# Patient Record
Sex: FEMALE | Race: WHITE | NOT HISPANIC OR LATINO | URBAN - METROPOLITAN AREA
[De-identification: names, ages, dates, MRNs, and addresses within clinical notes are randomized per-mention and may not be internally consistent; named-entity substitution may affect disease eponyms.]

---

## 2017-01-10 ENCOUNTER — INPATIENT (INPATIENT)
Facility: HOSPITAL | Age: 77
LOS: 7 days | Discharge: HOME HEALTH | End: 2017-01-18
Attending: SURGERY | Admitting: SURGERY
Payer: MEDICARE

## 2017-01-10 PROCEDURE — 93010 ELECTROCARDIOGRAM REPORT: CPT

## 2017-01-10 PROCEDURE — 71010: CPT | Mod: 26

## 2017-01-10 PROCEDURE — 99291 CRITICAL CARE FIRST HOUR: CPT

## 2017-01-12 PROCEDURE — 74177 CT ABD & PELVIS W/CONTRAST: CPT | Mod: 26

## 2017-01-17 PROCEDURE — 74177 CT ABD & PELVIS W/CONTRAST: CPT | Mod: 26

## 2017-01-23 DIAGNOSIS — I10 ESSENTIAL (PRIMARY) HYPERTENSION: ICD-10-CM

## 2017-01-23 DIAGNOSIS — E83.42 HYPOMAGNESEMIA: ICD-10-CM

## 2017-01-23 DIAGNOSIS — E78.5 HYPERLIPIDEMIA, UNSPECIFIED: ICD-10-CM

## 2017-01-23 DIAGNOSIS — R10.9 UNSPECIFIED ABDOMINAL PAIN: ICD-10-CM

## 2017-01-23 DIAGNOSIS — Z87.891 PERSONAL HISTORY OF NICOTINE DEPENDENCE: ICD-10-CM

## 2017-01-23 DIAGNOSIS — K51.90 ULCERATIVE COLITIS, UNSPECIFIED, WITHOUT COMPLICATIONS: ICD-10-CM

## 2017-01-23 DIAGNOSIS — K57.00 DIVERTICULITIS OF SMALL INTESTINE WITH PERFORATION AND ABSCESS WITHOUT BLEEDING: ICD-10-CM

## 2017-01-23 DIAGNOSIS — E11.9 TYPE 2 DIABETES MELLITUS WITHOUT COMPLICATIONS: ICD-10-CM

## 2017-01-23 DIAGNOSIS — I48.91 UNSPECIFIED ATRIAL FIBRILLATION: ICD-10-CM

## 2017-01-23 DIAGNOSIS — E87.1 HYPO-OSMOLALITY AND HYPONATREMIA: ICD-10-CM

## 2017-01-23 DIAGNOSIS — E87.6 HYPOKALEMIA: ICD-10-CM

## 2017-01-23 DIAGNOSIS — E66.9 OBESITY, UNSPECIFIED: ICD-10-CM

## 2017-01-30 DIAGNOSIS — K26.5 CHRONIC OR UNSPECIFIED DUODENAL ULCER WITH PERFORATION: ICD-10-CM

## 2017-01-30 DIAGNOSIS — L02.91 CUTANEOUS ABSCESS, UNSPECIFIED: ICD-10-CM

## 2020-08-16 ENCOUNTER — APPOINTMENT (OUTPATIENT)
Dept: DISASTER EMERGENCY | Facility: CLINIC | Age: 80
End: 2020-08-16

## 2020-08-16 DIAGNOSIS — Z01.818 ENCOUNTER FOR OTHER PREPROCEDURAL EXAMINATION: ICD-10-CM

## 2020-08-17 LAB — SARS-COV-2 N GENE NPH QL NAA+PROBE: NOT DETECTED

## 2020-08-18 NOTE — H&P ADULT - NSHPREVIEWOFSYSTEMS_GEN_ALL_CORE
General: Pt denies recent weight loss/fever/chills    Neurological: denies numbness or  sensation loss    Cardiovascular: denies chest pain/palpitations/leg edema    Respiratory and Thorax: + DAMON, denies cough/wheezing    Gastrointestinal: denies abdominal pain/diarrhea/ nausea/ vomiting/ constipation/bloody stool    Genitourinary: denies urinary frequency/urgency/ dysuria/ hematuria     Musculoskeletal: + LE swelling especially in B/L ankle, denies joint pain or restricted range of motion    Hematologic: denies abnormal bleeding

## 2020-08-18 NOTE — ASU PATIENT PROFILE, ADULT - MEDICATIONS TO HOLD
Patient given instructions from MD office to hold metformin and hctz 1 day prior to procedure and to hold eliquis prior to procedure (last eliquis dose taken in a.m. on 8/18, MD made aware).

## 2020-08-18 NOTE — H&P ADULT - PROBLEM SELECTOR PLAN 1
Pt is referred for Lt heart cath/possible PCI. Labs & medications are reviewed. Informed consent obtained after discussion of [facility] risks, benefits and alternatives  with patient. Risk discussed included, but not limited to MI, stroke, mortality, major bleeding, arrhythmia, or infection.  An educational material provided. Pt. verbalizes understandings of pre-procedural instructions.

## 2020-08-18 NOTE — H&P ADULT - ASSESSMENT
80 yo F with PMHx of HTN,HLD, DM, sleep apnea on CPAP, PAFib on eliquis, developed intermittent chest discomfort especially with movement. Pt underwent stress test which showed (+) ischemia in LAD territory.  Pt referred for LHC with possible intervention.  Covid-19 (8/16) non detected.  ASA:  Creatinine:  GFR:  Calculated bleeding risk score: 78 yo F with PMHx of HTN, HLD, T2DM,  sleep apnea on CPAP, P. AFib on eliquis with c/o worsening of SOB with exertion last 2-3 months. Pt underwent stress test which showed (+) ischemia in LAD territory.  Pt referred for LHC with possible intervention.    ASA: II  Creatinine: 1.1  GFR: 48  Calculated bleeding risk score: 2.6%

## 2020-08-18 NOTE — H&P ADULT - NSICDXPASTMEDICALHX_GEN_ALL_CORE_FT
PAST MEDICAL HISTORY:  DM (diabetes mellitus)     H/O gastroesophageal reflux (GERD)     History of ulcerative colitis     HLD (hyperlipidemia)     HTN (hypertension)     ANU (obstructive sleep apnea) on CPAP    Paroxysmal A-fib on Eliquis

## 2020-08-18 NOTE — H&P ADULT - NSHPPHYSICALEXAM_GEN_ALL_CORE
Vital Signs :   BP: 143/51       HR:72      RR: 17       O2 sat;     88% with RA, 94% with 2L NC     Constitutional: well developed, well nourished, no deformities and no acute distress    Neurological: Alert & Oriented x 3, DURANT, no focal deficits    HEENT: NC/AT, PERRLA, EOMI,  Neck supple.    Respiratory: CTA B/L, + fine crackles B/L, no wheezing     Cardiovascular: (+) S1 & S2, RRR, No murmur/ rub/ gallop     Gastrointestinal: soft, Nontender, nondistended, (+) BS    Genitourinary: non distended bladder, voiding freely    Extremities: + 2 pedal edema B/L, No clubbing, No cyanosis, 2+  PT/ DP pulses     Skin:  normal skin color and pigmentation, no skin lesions

## 2020-08-18 NOTE — H&P ADULT - NSICDXFAMILYHX_GEN_ALL_CORE_FT
FAMILY HISTORY:  FH: cancer, brother  FH: CHF (congestive heart failure), father  FH: HTN (hypertension), mother, father  FH: stroke, mother

## 2020-08-18 NOTE — H&P ADULT - HISTORY OF PRESENT ILLNESS
78 yo F with PMHx of HTN,HLD, DM, sleep apnea on CPAP, PAFib on eliquis, developed intermittent chest discomfort especially with movement. Pt underwent stress test which showed (+) ischemia in LAD territory.  Pt referred for LHC with possible intervention.  Covid-19 (8/16) non detected. 80 yo F with PMHx of HTN, HLD, T2DM,  sleep apnea on CPAP, P. AFib on eliquis with c/o worsening of SOB with exertion last 2-3 months. Pt underwent stress test which showed (+) ischemia in LAD territory.  Pt referred for LHC with possible intervention.  Covid-19 (8/16) non detected.

## 2020-08-18 NOTE — H&P ADULT - NSHPSOCIALHISTORY_GEN_ALL_CORE
Alcohol: 1-2 glasses of wine/ day   Smoking: quit 12 years ago, 1 pack/ day since age 12  Illicit drug: denies

## 2020-08-19 ENCOUNTER — OUTPATIENT (OUTPATIENT)
Dept: OUTPATIENT SERVICES | Facility: HOSPITAL | Age: 80
LOS: 1 days | Discharge: HOME | End: 2020-08-19
Payer: MEDICARE

## 2020-08-19 VITALS
RESPIRATION RATE: 16 BRPM | SYSTOLIC BLOOD PRESSURE: 133 MMHG | DIASTOLIC BLOOD PRESSURE: 50 MMHG | HEART RATE: 72 BPM | OXYGEN SATURATION: 94 %

## 2020-08-19 VITALS
OXYGEN SATURATION: 88 % | WEIGHT: 220.02 LBS | DIASTOLIC BLOOD PRESSURE: 51 MMHG | SYSTOLIC BLOOD PRESSURE: 143 MMHG | RESPIRATION RATE: 16 BRPM | HEART RATE: 71 BPM | TEMPERATURE: 98 F

## 2020-08-19 DIAGNOSIS — R94.39 ABNORMAL RESULT OF OTHER CARDIOVASCULAR FUNCTION STUDY: ICD-10-CM

## 2020-08-19 DIAGNOSIS — Z90.49 ACQUIRED ABSENCE OF OTHER SPECIFIED PARTS OF DIGESTIVE TRACT: Chronic | ICD-10-CM

## 2020-08-19 PROCEDURE — 93005 ELECTROCARDIOGRAM TRACING: CPT | Mod: XU

## 2020-08-19 PROCEDURE — C1894: CPT

## 2020-08-19 PROCEDURE — 93010 ELECTROCARDIOGRAM REPORT: CPT

## 2020-08-19 PROCEDURE — 93458 L HRT ARTERY/VENTRICLE ANGIO: CPT

## 2020-08-19 PROCEDURE — C1769: CPT

## 2020-08-19 PROCEDURE — C1887: CPT

## 2020-08-19 RX ORDER — APIXABAN 2.5 MG/1
1 TABLET, FILM COATED ORAL
Qty: 0 | Refills: 0 | DISCHARGE

## 2020-08-19 RX ORDER — FUROSEMIDE 40 MG
1 TABLET ORAL
Qty: 30 | Refills: 1
Start: 2020-08-19 | End: 2020-10-17

## 2020-08-19 RX ORDER — METFORMIN HYDROCHLORIDE 850 MG/1
1 TABLET ORAL
Qty: 0 | Refills: 0 | DISCHARGE

## 2020-08-19 NOTE — PACU DISCHARGE NOTE - COMMENTS
patient discharged to home. right wrist pressure drsg clean/dry/intact. IVL D/C'd site benign. Patient without any complaints. Vital signs stable. Discharge instructions given and understood by patient and medications reviewed. Will continue to monitor patient status.

## 2020-08-20 DIAGNOSIS — R94.39 ABNORMAL RESULT OF OTHER CARDIOVASCULAR FUNCTION STUDY: ICD-10-CM

## 2020-08-20 DIAGNOSIS — I20.0 UNSTABLE ANGINA: ICD-10-CM

## 2020-08-20 DIAGNOSIS — I10 ESSENTIAL (PRIMARY) HYPERTENSION: ICD-10-CM

## 2020-08-20 DIAGNOSIS — E11.9 TYPE 2 DIABETES MELLITUS WITHOUT COMPLICATIONS: ICD-10-CM

## 2020-08-20 DIAGNOSIS — I25.110 ATHEROSCLEROTIC HEART DISEASE OF NATIVE CORONARY ARTERY WITH UNSTABLE ANGINA PECTORIS: ICD-10-CM

## 2020-08-20 DIAGNOSIS — E78.5 HYPERLIPIDEMIA, UNSPECIFIED: ICD-10-CM

## 2020-08-20 DIAGNOSIS — Z88.6 ALLERGY STATUS TO ANALGESIC AGENT: ICD-10-CM

## 2020-08-20 DIAGNOSIS — G47.33 OBSTRUCTIVE SLEEP APNEA (ADULT) (PEDIATRIC): ICD-10-CM

## 2020-08-20 DIAGNOSIS — K21.9 GASTRO-ESOPHAGEAL REFLUX DISEASE WITHOUT ESOPHAGITIS: ICD-10-CM

## 2020-08-20 DIAGNOSIS — I48.91 UNSPECIFIED ATRIAL FIBRILLATION: ICD-10-CM

## 2020-08-22 RX ORDER — METFORMIN HYDROCHLORIDE 850 MG/1
1 TABLET ORAL
Qty: 0 | Refills: 0 | DISCHARGE
Start: 2020-08-22

## 2020-08-22 RX ORDER — METFORMIN HYDROCHLORIDE 850 MG/1
2 TABLET ORAL
Qty: 0 | Refills: 0 | DISCHARGE
Start: 2020-08-22

## 2020-08-28 NOTE — ASU PREOP CHECKLIST - TEMPERATURE IN FAHRENHEIT (DEGREES F)
98.1 p/w abd pain, lipase 101k, kojo 1.4  US abd showed distended gallbladder and cholelithiasis.  - possibly gallstone passed as abd pain subsided and kojo went down, liver enzymes trending down  - NPO, IVF, pain control  - f/u MRCP

## 2020-12-24 PROBLEM — E11.9 TYPE 2 DIABETES MELLITUS WITHOUT COMPLICATIONS: Chronic | Status: ACTIVE | Noted: 2020-08-19

## 2020-12-24 PROBLEM — I10 ESSENTIAL (PRIMARY) HYPERTENSION: Chronic | Status: ACTIVE | Noted: 2020-08-19

## 2020-12-24 PROBLEM — Z87.19 PERSONAL HISTORY OF OTHER DISEASES OF THE DIGESTIVE SYSTEM: Chronic | Status: ACTIVE | Noted: 2020-08-19

## 2020-12-24 PROBLEM — E78.5 HYPERLIPIDEMIA, UNSPECIFIED: Chronic | Status: ACTIVE | Noted: 2020-08-19

## 2020-12-24 PROBLEM — I48.0 PAROXYSMAL ATRIAL FIBRILLATION: Chronic | Status: ACTIVE | Noted: 2020-08-19

## 2020-12-24 PROBLEM — G47.33 OBSTRUCTIVE SLEEP APNEA (ADULT) (PEDIATRIC): Chronic | Status: ACTIVE | Noted: 2020-08-19

## 2021-02-17 ENCOUNTER — APPOINTMENT (OUTPATIENT)
Dept: DERMATOLOGY | Facility: CLINIC | Age: 81
End: 2021-02-17

## 2022-05-31 ENCOUNTER — APPOINTMENT (OUTPATIENT)
Dept: GASTROENTEROLOGY | Facility: CLINIC | Age: 82
End: 2022-05-31
Payer: MEDICARE

## 2022-05-31 VITALS
DIASTOLIC BLOOD PRESSURE: 53 MMHG | SYSTOLIC BLOOD PRESSURE: 125 MMHG | HEIGHT: 64 IN | WEIGHT: 205 LBS | BODY MASS INDEX: 35 KG/M2 | HEART RATE: 72 BPM

## 2022-05-31 DIAGNOSIS — R53.1 WEAKNESS: ICD-10-CM

## 2022-05-31 PROCEDURE — 99204 OFFICE O/P NEW MOD 45 MIN: CPT

## 2022-05-31 NOTE — ASSESSMENT
[FreeTextEntry1] : 81 F with hx of UC on mesalamine presenting for non bloody diarrhea x 1 month. She has + recent travel abroad, no recent abx use. Will check stool studies to r/o infectious cause vs ?UC flare. Has not had colonoscopy in many years, however she reports negative cologuard last year by her PCP. Will obtain labs to r/o electrolyte imbalance. Advised to maintain hydration. Will consider adding budesonide if stool studies negative for infectious source.  Will consider colonoscopy if symptoms continue or do not improve. Understanding verbalized. All questions answered. Discussed with Dr. Gomez. \par

## 2022-05-31 NOTE — PHYSICAL EXAM
[General Appearance - Alert] : alert [General Appearance - In No Acute Distress] : in no acute distress [Auscultation Breath Sounds / Voice Sounds] : lungs were clear to auscultation bilaterally [FreeTextEntry1] : on chronic home O2 via nasal cannula [Heart Rate And Rhythm] : heart rate was normal and rhythm regular [Heart Sounds] : normal S1 and S2 [Heart Sounds Gallop] : no gallops [Murmurs] : no murmurs [Heart Sounds Pericardial Friction Rub] : no pericardial rub [Bowel Sounds] : normal bowel sounds [Abdomen Soft] : soft [Abdomen Tenderness] : non-tender [] : no hepato-splenomegaly [Abdomen Mass (___ Cm)] : no abdominal mass palpated [Abnormal Walk] : normal gait [Nail Clubbing] : no clubbing  or cyanosis of the fingernails [Musculoskeletal - Swelling] : no joint swelling seen [Motor Tone] : muscle strength and tone were normal [Oriented To Time, Place, And Person] : oriented to person, place, and time [Impaired Insight] : insight and judgment were intact [Affect] : the affect was normal

## 2022-05-31 NOTE — HISTORY OF PRESENT ILLNESS
[FreeTextEntry1] : 81 F with hx of UC on mesalamine presenting for diarrhea x 1 month. Reports in early may she began to have frequent bouts of "loose stool" for about 1.5 weeks. Diarrhea resolved on its own, and she went on a trip to the [place]. Reports During her trip diarrhea resumed, and continued after she arrived home. Diarrhea stopped suddenly this past Thursday, and she had not had a BM until this AM. Describes BM this Am as not quite formed, "very soft", but not diarrhea. She feels more weak and tired than usual. Denies chest pain, shortness of breath, nausea, vomiting, abdominal pain, constipation, melena, hematochezia, unintentional weight changes, fevers, chills. Of note she has not had a colonoscopy in many years. Per patient her PCP sent cologuard last year which was negative. PCP has been managing UC medications.

## 2022-06-01 LAB
ALBUMIN SERPL ELPH-MCNC: 4.2 G/DL
ALP BLD-CCNC: 76 U/L
ALT SERPL-CCNC: 13 U/L
ANION GAP SERPL CALC-SCNC: 16 MMOL/L
AST SERPL-CCNC: 17 U/L
BASOPHILS # BLD AUTO: 0.04 K/UL
BASOPHILS NFR BLD AUTO: 0.3 %
BILIRUB SERPL-MCNC: 0.4 MG/DL
BUN SERPL-MCNC: 16 MG/DL
C DIFF TOX GENS STL QL NAA+PROBE: NORMAL
CALCIUM SERPL-MCNC: 9.2 MG/DL
CDIFF BY PCR: NOT DETECTED
CHLORIDE SERPL-SCNC: 101 MMOL/L
CO2 SERPL-SCNC: 24 MMOL/L
CREAT SERPL-MCNC: 1.08 MG/DL
EGFR: 51 ML/MIN/1.73M2
EOSINOPHIL # BLD AUTO: 0.03 K/UL
EOSINOPHIL NFR BLD AUTO: 0.2 %
GI PCR PANEL, STOOL: NORMAL
GLUCOSE SERPL-MCNC: 111 MG/DL
HCT VFR BLD CALC: 34.2 %
HGB BLD-MCNC: 10.3 G/DL
IMM GRANULOCYTES NFR BLD AUTO: 0.4 %
LYMPHOCYTES # BLD AUTO: 0.96 K/UL
LYMPHOCYTES NFR BLD AUTO: 7.5 %
MAN DIFF?: NORMAL
MCHC RBC-ENTMCNC: 24.9 PG
MCHC RBC-ENTMCNC: 30.1 GM/DL
MCV RBC AUTO: 82.6 FL
MONOCYTES # BLD AUTO: 0.76 K/UL
MONOCYTES NFR BLD AUTO: 5.9 %
NEUTROPHILS # BLD AUTO: 11.04 K/UL
NEUTROPHILS NFR BLD AUTO: 85.7 %
PLATELET # BLD AUTO: 337 K/UL
POTASSIUM SERPL-SCNC: 4.7 MMOL/L
PROT SERPL-MCNC: 6.5 G/DL
RBC # BLD: 4.14 M/UL
RBC # FLD: 17.2 %
SODIUM SERPL-SCNC: 141 MMOL/L
WBC # FLD AUTO: 12.88 K/UL

## 2022-06-02 LAB — DEPRECATED O AND P PREP STL: NORMAL

## 2022-07-12 ENCOUNTER — APPOINTMENT (OUTPATIENT)
Dept: GASTROENTEROLOGY | Facility: CLINIC | Age: 82
End: 2022-07-12

## 2022-07-12 VITALS
DIASTOLIC BLOOD PRESSURE: 65 MMHG | HEART RATE: 75 BPM | WEIGHT: 200 LBS | BODY MASS INDEX: 34.15 KG/M2 | SYSTOLIC BLOOD PRESSURE: 141 MMHG | HEIGHT: 64 IN

## 2022-07-12 DIAGNOSIS — R19.7 DIARRHEA, UNSPECIFIED: ICD-10-CM

## 2022-07-12 PROCEDURE — 99214 OFFICE O/P EST MOD 30 MIN: CPT

## 2022-07-12 RX ORDER — BUDESONIDE 3 MG/1
3 CAPSULE, COATED PELLETS ORAL DAILY
Qty: 90 | Refills: 1 | Status: ACTIVE | COMMUNITY
Start: 2022-07-12 | End: 1900-01-01

## 2022-07-12 NOTE — ASSESSMENT
[FreeTextEntry1] : 81 F with hx of UC on mesalamine presenting for follow up of loose mucousy stool. Since previous visit in May she has had less frequent, but intermittent bouts of loose stool and abdominal cramping. ?UC flare. Will start patient on course of budesonide, and will plan for colonoscopy. Discussed with patient prep, procedure, and risks. Verbalized understanding. Will do Miralax prep. On eliquis daily. Will obtain clearance from PCP/Cardiology prior to procedure, to determine when patient can stop AC. Procedure to be done @  due to comorbidities. Should follow up in 2-3 weeks. All questions answered. Discussed with Dr. Lo.  \par \par \par

## 2022-07-12 NOTE — HISTORY OF PRESENT ILLNESS
[FreeTextEntry1] : 81 F with hx of UC on mesalamine presenting for follow up of loose mucousy stool. Since previous visit in May she has had less frequent, but intermittent bouts of loose stool and abdominal cramping. Workup at last visit was negative for infectious source. She is concerned she may be experiencing a flare up of her ulcerative colitis despite being well managed on mesalamine for "years". Denies chest pain, shortness of breath, nausea, vomiting, constipation, melena, hematochezia, unintentional weight changes, fevers, chills.\par \par \par

## 2022-07-12 NOTE — PHYSICAL EXAM
[General Appearance - Alert] : alert [General Appearance - In No Acute Distress] : in no acute distress [Auscultation Breath Sounds / Voice Sounds] : lungs were clear to auscultation bilaterally [Heart Rate And Rhythm] : heart rate was normal and rhythm regular [Heart Sounds] : normal S1 and S2 [Heart Sounds Gallop] : no gallops [Murmurs] : no murmurs [Heart Sounds Pericardial Friction Rub] : no pericardial rub [Bowel Sounds] : normal bowel sounds [Abdomen Soft] : soft [Abdomen Tenderness] : non-tender [Abdomen Mass (___ Cm)] : no abdominal mass palpated [] : no hepato-splenomegaly [FreeTextEntry1] : wears O2 via nasal cannula [Abnormal Walk] : normal gait [Nail Clubbing] : no clubbing  or cyanosis of the fingernails [Musculoskeletal - Swelling] : no joint swelling seen [Motor Tone] : muscle strength and tone were normal [Oriented To Time, Place, And Person] : oriented to person, place, and time [Impaired Insight] : insight and judgment were intact [Affect] : the affect was normal

## 2022-08-03 ENCOUNTER — APPOINTMENT (OUTPATIENT)
Dept: GASTROENTEROLOGY | Facility: CLINIC | Age: 82
End: 2022-08-03

## 2022-08-03 VITALS
HEIGHT: 64 IN | BODY MASS INDEX: 34.15 KG/M2 | HEART RATE: 71 BPM | DIASTOLIC BLOOD PRESSURE: 62 MMHG | WEIGHT: 200 LBS | SYSTOLIC BLOOD PRESSURE: 116 MMHG

## 2022-08-03 PROCEDURE — 99213 OFFICE O/P EST LOW 20 MIN: CPT

## 2022-08-03 RX ORDER — ZOLPIDEM TARTRATE 5 MG/1
5 TABLET ORAL
Qty: 30 | Refills: 0 | Status: ACTIVE | COMMUNITY
Start: 2022-02-25

## 2022-08-03 RX ORDER — CHLORHEXIDINE GLUCONATE, 0.12% ORAL RINSE 1.2 MG/ML
0.12 SOLUTION DENTAL
Qty: 473 | Refills: 0 | Status: ACTIVE | COMMUNITY
Start: 2022-06-13

## 2022-08-03 NOTE — ASSESSMENT
[FreeTextEntry1] : 82 yo female with history of ulcerative colitis. Patient encouraged to take daily budesonide. Reviewed medication prior to procedure.

## 2022-08-03 NOTE — PHYSICAL EXAM

## 2022-08-03 NOTE — HISTORY OF PRESENT ILLNESS
[de-identified] : Ms. STEVENSON AQUINO is a 81 year old female with history of ulcerative colitis. Patient was recently evaluated here and was given a prescription for budesonide. Patient did not start taking the medication you. Patient or reports several bowel movements daily with associated mucus. Patient is currently oxygen dependent and is scheduled for colonoscopy in the upcoming weeks. She was scheduled to have cardiology evaluation for clearance to stop Eliquis.\par

## 2022-08-15 ENCOUNTER — APPOINTMENT (OUTPATIENT)
Dept: GASTROENTEROLOGY | Facility: CLINIC | Age: 82
End: 2022-08-15

## 2022-08-15 VITALS
DIASTOLIC BLOOD PRESSURE: 51 MMHG | WEIGHT: 200 LBS | SYSTOLIC BLOOD PRESSURE: 105 MMHG | HEART RATE: 63 BPM | HEIGHT: 64 IN | BODY MASS INDEX: 34.15 KG/M2

## 2022-08-15 DIAGNOSIS — K51.90 ULCERATIVE COLITIS, UNSPECIFIED, W/OUT COMPLICATIONS: ICD-10-CM

## 2022-08-15 PROCEDURE — 99213 OFFICE O/P EST LOW 20 MIN: CPT

## 2022-08-15 NOTE — ASSESSMENT
[FreeTextEntry1] : Kelsey Montero is a 81 year old female presenting today for follow up visit. Was seen last week by Dr. Gomez for possible flare up of UC symptoms, was recommended to start previously prescribed budesonide at that time. Pt started after that visit and shortly after noticed significant bruising to her right arm. Since then, stopped budesonide and was prescribed alternative of dicyclomine for abdominal cramping. Arm bruising has gotten significantly better since stopping, also reports abdominal cramping and diarrhea have significantly improved. Denies any fall or trauma to site, does take eliquis daily. \par \par Plan:\par Continue dicyclomine as prescribed, pt scheduled for colonoscopy next month. Reminded to obtain cardiac clearance to stop eliquis prior to that procedure. Pt expressed understanding, discussed with Dr. Madrid.

## 2022-08-15 NOTE — HISTORY OF PRESENT ILLNESS
[de-identified] : Kelsey Montero is a 81 year old female presenting today for follow up visit. Was seen last week by Dr. Gomez for possible flare up of UC symptoms, was recommended to start previously prescribed budesonide at that time. Pt started after that visit and shortly after noticed significant bruising to her right arm. Since then, stopped budesonide and was prescribed alternative of dicyclomine for abdominal cramping. Arm bruising has gotten significantly better since stopping, also reports abdominal cramping and diarrhea have significantly improved. Denies any fall or trauma to site, does take eliquis daily.

## 2022-08-15 NOTE — PHYSICAL EXAM
[General Appearance - Alert] : alert [General Appearance - In No Acute Distress] : in no acute distress [Sclera] : the sclera and conjunctiva were normal [Neck Appearance] : the appearance of the neck was normal [] : no respiratory distress [Respiration, Rhythm And Depth] : normal respiratory rhythm and effort [Heart Rate And Rhythm] : heart rate was normal and rhythm regular [Bowel Sounds] : normal bowel sounds [Abdomen Soft] : soft [Abdomen Tenderness] : non-tender [Abnormal Walk] : normal gait [Oriented To Time, Place, And Person] : oriented to person, place, and time [FreeTextEntry1] : noted healing bruises to right arm

## 2022-11-14 ENCOUNTER — INPATIENT (INPATIENT)
Facility: HOSPITAL | Age: 82
LOS: 7 days | Discharge: HOME | DRG: 417 | End: 2022-11-22
Attending: SURGERY | Admitting: SURGERY
Payer: MEDICARE

## 2022-11-14 VITALS
HEART RATE: 73 BPM | SYSTOLIC BLOOD PRESSURE: 127 MMHG | OXYGEN SATURATION: 89 % | RESPIRATION RATE: 17 BRPM | TEMPERATURE: 98 F | DIASTOLIC BLOOD PRESSURE: 70 MMHG | WEIGHT: 199.96 LBS | HEIGHT: 64 IN

## 2022-11-14 DIAGNOSIS — Z90.49 ACQUIRED ABSENCE OF OTHER SPECIFIED PARTS OF DIGESTIVE TRACT: Chronic | ICD-10-CM

## 2022-11-14 PROCEDURE — 93010 ELECTROCARDIOGRAM REPORT: CPT

## 2022-11-14 PROCEDURE — 99285 EMERGENCY DEPT VISIT HI MDM: CPT

## 2022-11-14 RX ORDER — ONDANSETRON 8 MG/1
4 TABLET, FILM COATED ORAL ONCE
Refills: 0 | Status: COMPLETED | OUTPATIENT
Start: 2022-11-14 | End: 2022-11-14

## 2022-11-14 RX ORDER — MORPHINE SULFATE 50 MG/1
4 CAPSULE, EXTENDED RELEASE ORAL ONCE
Refills: 0 | Status: DISCONTINUED | OUTPATIENT
Start: 2022-11-14 | End: 2022-11-14

## 2022-11-14 RX ORDER — SODIUM CHLORIDE 9 MG/ML
1000 INJECTION, SOLUTION INTRAVENOUS ONCE
Refills: 0 | Status: COMPLETED | OUTPATIENT
Start: 2022-11-14 | End: 2022-11-14

## 2022-11-14 RX ADMIN — ONDANSETRON 4 MILLIGRAM(S): 8 TABLET, FILM COATED ORAL at 23:57

## 2022-11-14 RX ADMIN — SODIUM CHLORIDE 1000 MILLILITER(S): 9 INJECTION, SOLUTION INTRAVENOUS at 23:57

## 2022-11-14 RX ADMIN — MORPHINE SULFATE 4 MILLIGRAM(S): 50 CAPSULE, EXTENDED RELEASE ORAL at 23:57

## 2022-11-14 NOTE — ED PROVIDER NOTE - CLINICAL SUMMARY MEDICAL DECISION MAKING FREE TEXT BOX
Pt is 80 y/o F w/ a OPMHx of COPD on 6L NC, PUD, s/p appendectomy at 15 y/o whop p/w epigastric abdominal pain and n/v today concerning for possible acute fredrick, pancreatitis, bowel obstruction of perf.  Will get:  - CBC, CMP, Lipase, Coags, T&S, UA, lactic acid  - IVFs  - Morphine for pain control and IV antiemetics  - Will get CT A/P with IV contrast  - May need surgial consult pending CT scan  - Will likely need admission

## 2022-11-14 NOTE — ED PROVIDER NOTE - PROGRESS NOTE DETAILS
CT with possible acute fredrick. RUQ ordered. Surgery team paged. pt signed out.   -Jono Dow, ALBERT-MS, MD  Internal/Emergency/Critical Medicine acute fredrick, pt received antibiotics, spoke withsurgery Dr. Maravilla, will admit to his service MILADY Biggs DO

## 2022-11-14 NOTE — ED PROVIDER NOTE - OBJECTIVE STATEMENT
Pt is 82 y/o F w/ a OPMHx of COPD on 6L NC, PUD, s/p appendectomy at 15 y/o whop p/w epigastric abdominal pain and n/v today with chills. Pt is 80 y/o F w/ a OPMHx of COPD on 6L NC, PUD, s/p appendectomy at 17 y/o whop p/w epigastric abdominal pain and n/v today with chills. Pt states that her pain is epigastric and nonradiated but worse with food + vomiting without blood, no back pain, no recent ETOH use, +flatus, no BMs today, decrease appetite last two days.

## 2022-11-14 NOTE — ED ADULT TRIAGE NOTE - CHIEF COMPLAINT QUOTE
Patient arrives by EMS from home for abdominal pain, lump in abdomen possible hernia, history of COPD on 6 L oxygen current O2 sat on 6 L 89%, diabetes, HTN. A&O x 3, states o2 is low for her normal sat is 91%. Pt placed on 8 L nasal canula.

## 2022-11-14 NOTE — ED PROVIDER NOTE - NS ED ROS FT
Constitutional: Denies fevers & chills  HEENT: Rhinorrhea & sore throat  Cardiac: Denies Chest pain & new LE edema  Pulmonary: Denies Shortness of breath & Cough  : Denies dysuria & hematuria.  Skin: Denies Rash or itching  Neuro: Denies new visual changes, confusion, headaches, and one sided paralysis  Psych: Denies SI & HI & Depression

## 2022-11-14 NOTE — ED ADULT NURSE NOTE - OBJECTIVE STATEMENT
Pt is alert and oriented x 3. Pt brought in by EMS; Pt c/o abd pain in epigastric region that started earlier this evening. Pt reports n/v, denies blood in emesis. Pt with h/o cyst in abd that burst many years ago. Pt with h/o COPD, on 6L O2 NC. Pt with h/o A-Fib, HTN, DM. Pt on 8L O2 NC. Pt denies dizziness, fever, chills, HA, chest pain, back pain, diarrhea, urinary s/s.

## 2022-11-15 DIAGNOSIS — K81.9 CHOLECYSTITIS, UNSPECIFIED: ICD-10-CM

## 2022-11-15 LAB
A1C WITH ESTIMATED AVERAGE GLUCOSE RESULT: 6.5 % — HIGH (ref 4–5.6)
ALBUMIN SERPL ELPH-MCNC: 3.6 G/DL — SIGNIFICANT CHANGE UP (ref 3.3–5)
ALP SERPL-CCNC: 93 U/L — SIGNIFICANT CHANGE UP (ref 40–120)
ALT FLD-CCNC: 37 U/L — SIGNIFICANT CHANGE UP (ref 12–78)
ANION GAP SERPL CALC-SCNC: 10 MMOL/L — SIGNIFICANT CHANGE UP (ref 5–17)
APPEARANCE UR: CLEAR — SIGNIFICANT CHANGE UP
APTT BLD: 31.7 SEC — SIGNIFICANT CHANGE UP (ref 27.5–35.5)
AST SERPL-CCNC: 76 U/L — HIGH (ref 15–37)
BASOPHILS # BLD AUTO: 0.04 K/UL — SIGNIFICANT CHANGE UP (ref 0–0.2)
BASOPHILS NFR BLD AUTO: 0.3 % — SIGNIFICANT CHANGE UP (ref 0–2)
BILIRUB SERPL-MCNC: 0.5 MG/DL — SIGNIFICANT CHANGE UP (ref 0.2–1.2)
BILIRUB UR-MCNC: NEGATIVE — SIGNIFICANT CHANGE UP
BUN SERPL-MCNC: 19 MG/DL — SIGNIFICANT CHANGE UP (ref 7–23)
CALCIUM SERPL-MCNC: 9.3 MG/DL — SIGNIFICANT CHANGE UP (ref 8.5–10.1)
CHLORIDE SERPL-SCNC: 104 MMOL/L — SIGNIFICANT CHANGE UP (ref 96–108)
CO2 SERPL-SCNC: 27 MMOL/L — SIGNIFICANT CHANGE UP (ref 22–31)
COLOR SPEC: YELLOW — SIGNIFICANT CHANGE UP
CREAT SERPL-MCNC: 1.38 MG/DL — HIGH (ref 0.5–1.3)
DIFF PNL FLD: NEGATIVE — SIGNIFICANT CHANGE UP
EGFR: 38 ML/MIN/1.73M2 — LOW
EOSINOPHIL # BLD AUTO: 0 K/UL — SIGNIFICANT CHANGE UP (ref 0–0.5)
EOSINOPHIL NFR BLD AUTO: 0 % — SIGNIFICANT CHANGE UP (ref 0–6)
ESTIMATED AVERAGE GLUCOSE: 140 MG/DL — HIGH (ref 68–114)
FLUAV AG NPH QL: SIGNIFICANT CHANGE UP
FLUBV AG NPH QL: SIGNIFICANT CHANGE UP
GLUCOSE SERPL-MCNC: 183 MG/DL — HIGH (ref 70–99)
GLUCOSE UR QL: NEGATIVE — SIGNIFICANT CHANGE UP
HCT VFR BLD CALC: 31.5 % — LOW (ref 34.5–45)
HGB BLD-MCNC: 9.8 G/DL — LOW (ref 11.5–15.5)
IMM GRANULOCYTES NFR BLD AUTO: 0.3 % — SIGNIFICANT CHANGE UP (ref 0–0.9)
INR BLD: 1.35 RATIO — HIGH (ref 0.88–1.16)
KETONES UR-MCNC: NEGATIVE — SIGNIFICANT CHANGE UP
LACTATE SERPL-SCNC: 3.2 MMOL/L — HIGH (ref 0.7–2)
LEUKOCYTE ESTERASE UR-ACNC: ABNORMAL
LIDOCAIN IGE QN: 158 U/L — SIGNIFICANT CHANGE UP (ref 73–393)
LYMPHOCYTES # BLD AUTO: 0.85 K/UL — LOW (ref 1–3.3)
LYMPHOCYTES # BLD AUTO: 7.4 % — LOW (ref 13–44)
MCHC RBC-ENTMCNC: 24.9 PG — LOW (ref 27–34)
MCHC RBC-ENTMCNC: 31.1 GM/DL — LOW (ref 32–36)
MCV RBC AUTO: 80.2 FL — SIGNIFICANT CHANGE UP (ref 80–100)
MONOCYTES # BLD AUTO: 0.55 K/UL — SIGNIFICANT CHANGE UP (ref 0–0.9)
MONOCYTES NFR BLD AUTO: 4.8 % — SIGNIFICANT CHANGE UP (ref 2–14)
NEUTROPHILS # BLD AUTO: 10.04 K/UL — HIGH (ref 1.8–7.4)
NEUTROPHILS NFR BLD AUTO: 87.2 % — HIGH (ref 43–77)
NITRITE UR-MCNC: NEGATIVE — SIGNIFICANT CHANGE UP
PH UR: 5 — SIGNIFICANT CHANGE UP (ref 5–8)
PLATELET # BLD AUTO: 346 K/UL — SIGNIFICANT CHANGE UP (ref 150–400)
POTASSIUM SERPL-MCNC: 3.9 MMOL/L — SIGNIFICANT CHANGE UP (ref 3.5–5.3)
POTASSIUM SERPL-SCNC: 3.9 MMOL/L — SIGNIFICANT CHANGE UP (ref 3.5–5.3)
PROT SERPL-MCNC: 7.5 GM/DL — SIGNIFICANT CHANGE UP (ref 6–8.3)
PROT UR-MCNC: NEGATIVE — SIGNIFICANT CHANGE UP
PROTHROM AB SERPL-ACNC: 15.7 SEC — HIGH (ref 10.5–13.4)
RBC # BLD: 3.93 M/UL — SIGNIFICANT CHANGE UP (ref 3.8–5.2)
RBC # FLD: 17.7 % — HIGH (ref 10.3–14.5)
RSV RNA NPH QL NAA+NON-PROBE: SIGNIFICANT CHANGE UP
SARS-COV-2 RNA SPEC QL NAA+PROBE: SIGNIFICANT CHANGE UP
SODIUM SERPL-SCNC: 141 MMOL/L — SIGNIFICANT CHANGE UP (ref 135–145)
SP GR SPEC: 1.01 — SIGNIFICANT CHANGE UP (ref 1.01–1.02)
UROBILINOGEN FLD QL: NEGATIVE — SIGNIFICANT CHANGE UP
WBC # BLD: 11.52 K/UL — HIGH (ref 3.8–10.5)
WBC # FLD AUTO: 11.52 K/UL — HIGH (ref 3.8–10.5)

## 2022-11-15 PROCEDURE — 71250 CT THORAX DX C-: CPT

## 2022-11-15 PROCEDURE — 85025 COMPLETE CBC W/AUTO DIFF WBC: CPT

## 2022-11-15 PROCEDURE — 94640 AIRWAY INHALATION TREATMENT: CPT

## 2022-11-15 PROCEDURE — 86901 BLOOD TYPING SEROLOGIC RH(D): CPT

## 2022-11-15 PROCEDURE — 83036 HEMOGLOBIN GLYCOSYLATED A1C: CPT

## 2022-11-15 PROCEDURE — 97116 GAIT TRAINING THERAPY: CPT | Mod: GP

## 2022-11-15 PROCEDURE — 86850 RBC ANTIBODY SCREEN: CPT

## 2022-11-15 PROCEDURE — 36415 COLL VENOUS BLD VENIPUNCTURE: CPT

## 2022-11-15 PROCEDURE — 80053 COMPREHEN METABOLIC PANEL: CPT

## 2022-11-15 PROCEDURE — C9399: CPT

## 2022-11-15 PROCEDURE — 71045 X-RAY EXAM CHEST 1 VIEW: CPT

## 2022-11-15 PROCEDURE — 86900 BLOOD TYPING SEROLOGIC ABO: CPT

## 2022-11-15 PROCEDURE — 83735 ASSAY OF MAGNESIUM: CPT

## 2022-11-15 PROCEDURE — G1004: CPT

## 2022-11-15 PROCEDURE — 97162 PT EVAL MOD COMPLEX 30 MIN: CPT | Mod: GP

## 2022-11-15 PROCEDURE — 82962 GLUCOSE BLOOD TEST: CPT

## 2022-11-15 PROCEDURE — 99221 1ST HOSP IP/OBS SF/LOW 40: CPT

## 2022-11-15 PROCEDURE — 74174 CTA ABD&PLVS W/CONTRAST: CPT | Mod: 26,MG

## 2022-11-15 PROCEDURE — 88304 TISSUE EXAM BY PATHOLOGIST: CPT

## 2022-11-15 PROCEDURE — 71045 X-RAY EXAM CHEST 1 VIEW: CPT | Mod: 26

## 2022-11-15 PROCEDURE — 99222 1ST HOSP IP/OBS MODERATE 55: CPT

## 2022-11-15 PROCEDURE — 84100 ASSAY OF PHOSPHORUS: CPT

## 2022-11-15 PROCEDURE — 76705 ECHO EXAM OF ABDOMEN: CPT | Mod: 26

## 2022-11-15 PROCEDURE — 97530 THERAPEUTIC ACTIVITIES: CPT | Mod: GP

## 2022-11-15 PROCEDURE — 80076 HEPATIC FUNCTION PANEL: CPT

## 2022-11-15 PROCEDURE — 71250 CT THORAX DX C-: CPT | Mod: 26

## 2022-11-15 PROCEDURE — 85027 COMPLETE CBC AUTOMATED: CPT

## 2022-11-15 PROCEDURE — 80048 BASIC METABOLIC PNL TOTAL CA: CPT

## 2022-11-15 RX ORDER — PIPERACILLIN AND TAZOBACTAM 4; .5 G/20ML; G/20ML
3.38 INJECTION, POWDER, LYOPHILIZED, FOR SOLUTION INTRAVENOUS EVERY 8 HOURS
Refills: 0 | Status: DISCONTINUED | OUTPATIENT
Start: 2022-11-15 | End: 2022-11-18

## 2022-11-15 RX ORDER — TIOTROPIUM BROMIDE 18 UG/1
1 CAPSULE ORAL; RESPIRATORY (INHALATION) DAILY
Refills: 0 | Status: DISCONTINUED | OUTPATIENT
Start: 2022-11-15 | End: 2022-11-22

## 2022-11-15 RX ORDER — HEPARIN SODIUM 5000 [USP'U]/ML
5000 INJECTION INTRAVENOUS; SUBCUTANEOUS EVERY 12 HOURS
Refills: 0 | Status: DISCONTINUED | OUTPATIENT
Start: 2022-11-15 | End: 2022-11-20

## 2022-11-15 RX ORDER — MORPHINE SULFATE 50 MG/1
4 CAPSULE, EXTENDED RELEASE ORAL ONCE
Refills: 0 | Status: DISCONTINUED | OUTPATIENT
Start: 2022-11-15 | End: 2022-11-15

## 2022-11-15 RX ORDER — L.ACIDOPH/B.ANIMALIS/B.LONGUM 15B CELL
1 CAPSULE ORAL
Qty: 0 | Refills: 0 | DISCHARGE

## 2022-11-15 RX ORDER — MORPHINE SULFATE 50 MG/1
4 CAPSULE, EXTENDED RELEASE ORAL EVERY 4 HOURS
Refills: 0 | Status: DISCONTINUED | OUTPATIENT
Start: 2022-11-15 | End: 2022-11-22

## 2022-11-15 RX ORDER — ALLOPURINOL 300 MG
300 TABLET ORAL DAILY
Refills: 0 | Status: DISCONTINUED | OUTPATIENT
Start: 2022-11-15 | End: 2022-11-22

## 2022-11-15 RX ORDER — SODIUM CHLORIDE 9 MG/ML
1000 INJECTION, SOLUTION INTRAVENOUS
Refills: 0 | Status: DISCONTINUED | OUTPATIENT
Start: 2022-11-15 | End: 2022-11-22

## 2022-11-15 RX ORDER — OXYCODONE AND ACETAMINOPHEN 5; 325 MG/1; MG/1
1 TABLET ORAL EVERY 4 HOURS
Refills: 0 | Status: DISCONTINUED | OUTPATIENT
Start: 2022-11-15 | End: 2022-11-17

## 2022-11-15 RX ORDER — GLUCAGON INJECTION, SOLUTION 0.5 MG/.1ML
1 INJECTION, SOLUTION SUBCUTANEOUS ONCE
Refills: 0 | Status: DISCONTINUED | OUTPATIENT
Start: 2022-11-15 | End: 2022-11-22

## 2022-11-15 RX ORDER — ONDANSETRON 8 MG/1
4 TABLET, FILM COATED ORAL EVERY 6 HOURS
Refills: 0 | Status: DISCONTINUED | OUTPATIENT
Start: 2022-11-15 | End: 2022-11-22

## 2022-11-15 RX ORDER — DEXTROSE 50 % IN WATER 50 %
25 SYRINGE (ML) INTRAVENOUS ONCE
Refills: 0 | Status: DISCONTINUED | OUTPATIENT
Start: 2022-11-15 | End: 2022-11-22

## 2022-11-15 RX ORDER — BUDESONIDE AND FORMOTEROL FUMARATE DIHYDRATE 160; 4.5 UG/1; UG/1
2 AEROSOL RESPIRATORY (INHALATION)
Refills: 0 | Status: DISCONTINUED | OUTPATIENT
Start: 2022-11-15 | End: 2022-11-22

## 2022-11-15 RX ORDER — PANTOPRAZOLE SODIUM 20 MG/1
40 TABLET, DELAYED RELEASE ORAL
Refills: 0 | Status: DISCONTINUED | OUTPATIENT
Start: 2022-11-15 | End: 2022-11-22

## 2022-11-15 RX ORDER — SODIUM CHLORIDE 9 MG/ML
1000 INJECTION INTRAMUSCULAR; INTRAVENOUS; SUBCUTANEOUS
Refills: 0 | Status: DISCONTINUED | OUTPATIENT
Start: 2022-11-15 | End: 2022-11-18

## 2022-11-15 RX ORDER — METOPROLOL TARTRATE 50 MG
25 TABLET ORAL DAILY
Refills: 0 | Status: DISCONTINUED | OUTPATIENT
Start: 2022-11-15 | End: 2022-11-22

## 2022-11-15 RX ORDER — PIPERACILLIN AND TAZOBACTAM 4; .5 G/20ML; G/20ML
3.38 INJECTION, POWDER, LYOPHILIZED, FOR SOLUTION INTRAVENOUS ONCE
Refills: 0 | Status: COMPLETED | OUTPATIENT
Start: 2022-11-15 | End: 2022-11-15

## 2022-11-15 RX ORDER — PANTOPRAZOLE SODIUM 20 MG/1
1 TABLET, DELAYED RELEASE ORAL
Qty: 0 | Refills: 0 | DISCHARGE

## 2022-11-15 RX ORDER — PIPERACILLIN AND TAZOBACTAM 4; .5 G/20ML; G/20ML
3.38 INJECTION, POWDER, LYOPHILIZED, FOR SOLUTION INTRAVENOUS ONCE
Refills: 0 | Status: DISCONTINUED | OUTPATIENT
Start: 2022-11-15 | End: 2022-11-15

## 2022-11-15 RX ORDER — GABAPENTIN 400 MG/1
100 CAPSULE ORAL AT BEDTIME
Refills: 0 | Status: DISCONTINUED | OUTPATIENT
Start: 2022-11-15 | End: 2022-11-22

## 2022-11-15 RX ORDER — DEXTROSE 50 % IN WATER 50 %
15 SYRINGE (ML) INTRAVENOUS ONCE
Refills: 0 | Status: DISCONTINUED | OUTPATIENT
Start: 2022-11-15 | End: 2022-11-22

## 2022-11-15 RX ORDER — DILTIAZEM HCL 120 MG
240 CAPSULE, EXT RELEASE 24 HR ORAL DAILY
Refills: 0 | Status: DISCONTINUED | OUTPATIENT
Start: 2022-11-15 | End: 2022-11-22

## 2022-11-15 RX ORDER — OXYCODONE AND ACETAMINOPHEN 5; 325 MG/1; MG/1
2 TABLET ORAL EVERY 6 HOURS
Refills: 0 | Status: DISCONTINUED | OUTPATIENT
Start: 2022-11-15 | End: 2022-11-17

## 2022-11-15 RX ORDER — DEXTROSE 50 % IN WATER 50 %
12.5 SYRINGE (ML) INTRAVENOUS ONCE
Refills: 0 | Status: DISCONTINUED | OUTPATIENT
Start: 2022-11-15 | End: 2022-11-22

## 2022-11-15 RX ORDER — SIMVASTATIN 20 MG/1
20 TABLET, FILM COATED ORAL AT BEDTIME
Refills: 0 | Status: DISCONTINUED | OUTPATIENT
Start: 2022-11-15 | End: 2022-11-22

## 2022-11-15 RX ORDER — INSULIN LISPRO 100/ML
VIAL (ML) SUBCUTANEOUS
Refills: 0 | Status: DISCONTINUED | OUTPATIENT
Start: 2022-11-15 | End: 2022-11-18

## 2022-11-15 RX ORDER — MESALAMINE 400 MG
1.5 TABLET, DELAYED RELEASE (ENTERIC COATED) ORAL DAILY
Refills: 0 | Status: DISCONTINUED | OUTPATIENT
Start: 2022-11-15 | End: 2022-11-22

## 2022-11-15 RX ADMIN — BUDESONIDE AND FORMOTEROL FUMARATE DIHYDRATE 2 PUFF(S): 160; 4.5 AEROSOL RESPIRATORY (INHALATION) at 21:37

## 2022-11-15 RX ADMIN — Medication 300 MILLIGRAM(S): at 11:11

## 2022-11-15 RX ADMIN — Medication 40 MILLIGRAM(S): at 21:44

## 2022-11-15 RX ADMIN — MORPHINE SULFATE 4 MILLIGRAM(S): 50 CAPSULE, EXTENDED RELEASE ORAL at 02:23

## 2022-11-15 RX ADMIN — PIPERACILLIN AND TAZOBACTAM 200 GRAM(S): 4; .5 INJECTION, POWDER, LYOPHILIZED, FOR SOLUTION INTRAVENOUS at 07:11

## 2022-11-15 RX ADMIN — Medication 1.5 GRAM(S): at 11:11

## 2022-11-15 RX ADMIN — SODIUM CHLORIDE 75 MILLILITER(S): 9 INJECTION INTRAMUSCULAR; INTRAVENOUS; SUBCUTANEOUS at 11:12

## 2022-11-15 RX ADMIN — Medication 40 MILLIGRAM(S): at 16:56

## 2022-11-15 RX ADMIN — SIMVASTATIN 20 MILLIGRAM(S): 20 TABLET, FILM COATED ORAL at 21:44

## 2022-11-15 RX ADMIN — PIPERACILLIN AND TAZOBACTAM 25 GRAM(S): 4; .5 INJECTION, POWDER, LYOPHILIZED, FOR SOLUTION INTRAVENOUS at 21:42

## 2022-11-15 RX ADMIN — GABAPENTIN 100 MILLIGRAM(S): 400 CAPSULE ORAL at 21:44

## 2022-11-15 RX ADMIN — Medication 25 MILLIGRAM(S): at 11:11

## 2022-11-15 RX ADMIN — Medication 2: at 16:54

## 2022-11-15 RX ADMIN — OXYCODONE AND ACETAMINOPHEN 2 TABLET(S): 5; 325 TABLET ORAL at 15:30

## 2022-11-15 RX ADMIN — SODIUM CHLORIDE 75 MILLILITER(S): 9 INJECTION INTRAMUSCULAR; INTRAVENOUS; SUBCUTANEOUS at 07:57

## 2022-11-15 RX ADMIN — HEPARIN SODIUM 5000 UNIT(S): 5000 INJECTION INTRAVENOUS; SUBCUTANEOUS at 21:41

## 2022-11-15 RX ADMIN — Medication 240 MILLIGRAM(S): at 11:11

## 2022-11-15 RX ADMIN — Medication 40 MILLIGRAM(S): at 11:10

## 2022-11-15 RX ADMIN — OXYCODONE AND ACETAMINOPHEN 2 TABLET(S): 5; 325 TABLET ORAL at 14:53

## 2022-11-15 RX ADMIN — PIPERACILLIN AND TAZOBACTAM 25 GRAM(S): 4; .5 INJECTION, POWDER, LYOPHILIZED, FOR SOLUTION INTRAVENOUS at 14:50

## 2022-11-15 NOTE — PATIENT PROFILE ADULT - NSPROPOAPRESSUREINJURY_GEN_A_NUR
Patient Education     Pharyngitis: Strep (Confirmed)    You have had a positive test for strep throat. Strep throat is a contagious illness. It is spread by coughing, kissing or by touching others after touching your mouth or nose. Symptoms include throat pain that is worse with swallowing, aching all over, headache, and fever. It is treated with antibiotic medicine. This should help you start to feel better in 1 to 2 days.  Home care  · Rest at home. Drink plenty of fluids to you won't get dehydrated.  · No work or school for the first 2 days of taking the antibiotics. After this time, you will not be contagious. You can then return to school or work if you are feeling better.   · Take antibiotic medicine for the full 10 days, even if you feel better. This is very important to ensure the infection is treated. It is also important to prevent medicine-resistant germs from developing. If you were given an antibiotic shot, you don't need any more antibiotics.  · You may use acetaminophen or ibuprofen to control pain or fever, unless another medicine was prescribed for this. Talk with your healthcare provider before taking these medicines if you have chronic liver or kidney disease. Also talk with your healthcare provider if you have had a stomach ulcer or GI bleeding.  · Throat lozenges or sprays help reduce pain. Gargling with warm saltwater will also reduce throat pain. Dissolve 1/2 teaspoon of salt in 1 glass of warm water. This may be useful just before meals.   · Soft foods are OK. Don't eat salty or spicy foods.  Follow-up care  Follow up with your healthcare provider or our staff if you don't get better over the next week.  When to seek medical advice  Call your healthcare provider right away if any of these occur:  · Fever of 100.4ºF (38ºC) or higher, or as directed by your healthcare provider  · New or worsening ear pain, sinus pain, or headache  · Painful lumps in the back of neck  · Stiff neck  · Lymph  nodes getting larger or becoming soft in the middle  · You can't swallow liquids or you can't open your mouth wide because of throat pain  · Signs of dehydration. These include very dark urine or no urine, sunken eyes, and dizziness.  · Trouble breathing or noisy breathing  · Muffled voice  · Rash  Prevention  Here are steps you can take to help prevent an infection:  · Keep good hand washing habits.  · Don’t have close contact with people who have sore throats, colds, or other upper respiratory infections.  · Don’t smoke, and stay away from secondhand smoke.  Date Last Reviewed: 11/1/2017  © 7737-6197 [employer]. [address] [place] [address], [place] [address]. All rights reserved. This information is not intended as a substitute for professional medical care. Always follow your healthcare professional's instructions.            no

## 2022-11-15 NOTE — H&P ADULT - HISTORY OF PRESENT ILLNESS
80 yo F with pmh COPD on 6L NC, colitis, PUD presents to  with acute onset of RUQ abdominal pain. Pt denies any event that provoked the episode, however states it became so bad she decided to come to  for additional chris/ and evaluation. In the ER, a sonogram revealed a distended gallbladder with presence of gallstones, indicating acute cholecystitis and a surgical consultation.  pt denies any previous h/o these attacks.  She has a past surgical hx of an open appendectomy when she was 16. No other abx sx reported.  She has NKDA.
Family

## 2022-11-15 NOTE — H&P ADULT - NSHPPHYSICALEXAM_GEN_ALL_CORE
General: INAD A&Ox3  Lungs: CTA b/l  CV RRR  Abd: soft, morbidly obese. Tenderness elicited with palpation of RUQ. +metz's  ext: venous stasis changes.

## 2022-11-15 NOTE — PATIENT PROFILE ADULT - FALL HARM RISK - HARM RISK INTERVENTIONS

## 2022-11-15 NOTE — CONSULT NOTE ADULT - ASSESSMENT
80 yo F     PROBLEMS:    Acute cholecystitis  AC on chronic hypoxaemic respiratory failure  COPD/EMPHYSEMA    PLAN:    NPO  IVF/IV Zosyn  Jamesis on hold for OR, either today or tomorrow  8L NC  CT CHEST   IV STEROIDS  pain control  DVT ppx   82 yo F     PROBLEMS:    Acute cholecystitis  AC on chronic hypoxaemic respiratory failure  COPD/EMPHYSEMA    PLAN:    CT chest lower lung chronic changes pat condition is optimized there is no absolute contraindication for plan procedure  NPO  IVF/IV Zosyn  8L NC  IV solumedrols 40mg q8hr  pain control  d/w pat & daughter  DVT ppx-Elliquis on hold for OR

## 2022-11-15 NOTE — H&P ADULT - NSHPLABSRESULTS_GEN_ALL_CORE
9.8    11.52 )-----------( 346      ( 14 Nov 2022 22:23 )             31.5   11-14    141  |  104  |  19  ----------------------------<  183<H>  3.9   |  27  |  1.38<H>    Ca    9.3      14 Nov 2022 22:23    TPro  7.5  /  Alb  3.6  /  TBili  0.5  /  DBili  x   /  AST  76<H>  /  ALT  37  /  AlkPhos  93  11-14    Sonogram RUQ: 11/15  IMPRESSION:  Distended gallbladder with thickened edematous gallbladder wall and   multiple small gallstones. These findings suggest a very high positive   predictive value for acute cholecystitis. Recommend clinical and lab   correlation.

## 2022-11-15 NOTE — PROGRESS NOTE ADULT - SUBJECTIVE AND OBJECTIVE BOX
Feels well, less pain, no nausea  VSS afeb  Cor- RRR  Abd- + BS soft tender mid epigastrum and RUQ  Ext- no edema

## 2022-11-15 NOTE — PROGRESS NOTE ADULT - SUBJECTIVE AND OBJECTIVE BOX
CC: Abd pain  HPI:  81 year old woman with PMHx of COPD, chronic respiratory failure (on 6L NC), colitis, PUD, AS, DM2, HTN, HLD, ANU, Pafib presents with abdominal pain.  Denies any history of abd pain, onset is sudden, 10 out of 10 causing her to come to ED for further evaluation.  IN ED found to have acute choley.  SHe was given IV antibiotics, surgery notified and admitted to [facility].   At bedside pt given pain meds for pain control, she is weak, lethargic but comfortable, she is alert, answering all questions.  Pt Denies fever, chills, N, V, abd pain, CP, SOB.  Pt normally very active person.  Daughter at bedside.    REVIEW OF SYSTEMS: All other review of systems is negative unless indicated above.    Allergies and Intolerances:        Allergies:  	aspirin: Drug, Stomach Upset, cholitis    Home Medications:  allopurinol 300 mg oral tablet: 1 tab(s) orally once a day (15 Nov 2022 09:50)  Apriso 0.375 g oral capsule, extended release: 4 cap(s) orally once a day (15 Nov 2022 09:50)  Cartia  mg/24 hours oral capsule, extended release: 1 cap(s) orally once a day (15 Nov 2022 09:50)  CoQ10: orally once a day (15 Nov 2022 09:50)  dicyclomine 10 mg oral capsule: 1 cap(s) orally 3 times a day, As Needed (15 Nov 2022 09:50)  Eliquis 5 mg oral tablet: 1 tab(s) orally 2 times a day  Resume at 11pm tonight (20) (15 Nov 2022 09:50)  furosemide 20 mg oral tablet: 1 tab(s) orally once a day (15 Nov 2022 09:50)  gabapentin 100 mg oral tablet: 1 tab(s) orally once a day (at bedtime) (15 Nov 2022 09:50)  metFORMIN 500 mg oral tablet: 2 tab(s) orally once a day (in the morning) (15 Nov 2022 09:50)  metFORMIN 500 mg oral tablet: 1 tab(s) orally once (at bedtime) (15 Nov 2022 09:50)  Metoprolol Succinate ER 25 mg oral tablet, extended release: 1 tab(s) orally once a day (15 Nov 2022 09:50)  pantoprazole 40 mg oral delayed release tablet: 1 tab(s) orally once a day (15 Nov 2022 09:50)  Probiotic Formula oral capsule: 1 cap(s) orally 2 times a day (15 Nov 2022 09:50)  Prolia 60 mg/mL subcutaneous solution: subcutaneous every 6 months (15 Nov 2022 09:50)  simvastatin 20 mg oral tablet: 1 tab(s) orally once a day (at bedtime) (15 Nov 2022 09:50)  Trelegy Ellipta 100 mcg-62.5 mcg-25 mcg/inh inhalation powder: 1 puff(s) inhaled once a day (15 Nov 2022 09:50)  Vitamin D3 25 mcg (1000 intl units) oral tablet: 1 tab(s) orally once a day (15 Nov 2022 09:50)  zolpidem 5 mg oral tablet: 1 tab(s) orally once a day (at bedtime), As Needed (15 Nov 2022 09:50)      Patient History:   Past Medical, Past Surgical, and Family History:  PAST MEDICAL HISTORY:  DM (diabetes mellitus)   H/O gastroesophageal reflux (GERD)   History of ulcerative colitis   HLD (hyperlipidemia)   HTN (hypertension)   ANU (obstructive sleep apnea) on CPAP  Paroxysmal A-fib on Eliquis.     PAST SURGICAL HISTORY:  S/P appendectomy.     FAMILY HISTORY:  FH: cancer, brother  FH: CHF (congestive heart failure), father  FH: HTN (hypertension), mother, father  FH: stroke, mother.    Social History:  · Substance use	No      Vital Signs Last 24 Hrs  T(C): 36.7 (15 Nov 2022 08:50), Max: 37.1 (15 Nov 2022 03:00)  T(F): 98.1 (15 Nov 2022 08:50), Max: 98.7 (15 Nov 2022 03:00)  HR: 92 (15 Nov 2022 11:00) (73 - 98)  BP: 140/49 (15 Nov 2022 11:00) (121/61 - 151/57)  BP(mean): 78 (15 Nov 2022 07:20) (78 - 83)  RR: 22 (15 Nov 2022 11:00) (17 - 24)  SpO2: 94% (15 Nov 2022 11:00) (89% - 95%)    Parameters below as of 15 Nov 2022 11:00  Patient On (Oxygen Delivery Method): nasal cannula  O2 Flow (L/min): 6      PHYSICAL EXAM:    Constitutional: NAD, awake and alert, well-developed  HEENT: PERR, EOMI, Normal Hearing, MMM  Neck: Soft and supple  Respiratory: Breath sounds are decreased b/l, NC in place  Cardiovascular: S1 and S2, regular rate and rhythm, + 3/6  Murmur, gallops or rubs  Gastrointestinal: Bowel Sounds present, soft, nontender, nondistended, no guarding, no rebound  Extremities: No peripheral edema  Neurological: A/O x 3, no focal deficits in my limited exam                                9.8    11.52 )-----------( 346      ( 2022 22:23 )             31.5     11-14    141  |  104  |  19  ----------------------------<  183<H>  3.9   |  27  |  1.38<H>    Ca    9.3      2022 22:23    TPro  7.5  /  Alb  3.6  /  TBili  0.5  /  DBili  x   /  AST  76<H>  /  ALT  37  /  AlkPhos  93  11-14    CAPILLARY BLOOD GLUCOSE      POCT Blood Glucose.: 147 mg/dL (15 Nov 2022 12:08)  POCT Blood Glucose.: 177 mg/dL (15 Nov 2022 07:14)    LIVER FUNCTIONS - ( 2022 22:23 )  Alb: 3.6 g/dL / Pro: 7.5 gm/dL / ALK PHOS: 93 U/L / ALT: 37 U/L / AST: 76 U/L / GGT: x           PT/INR - ( 2022 22:23 )   PT: 15.7 sec;   INR: 1.35 ratio         PTT - ( 2022 22:23 )  PTT:31.7 sec  Urinalysis Basic - ( 2022 22:23 )    Color: Yellow / Appearance: Clear / S.015 / pH: x  Gluc: x / Ketone: Negative  / Bili: Negative / Urobili: Negative   Blood: x / Protein: Negative / Nitrite: Negative   Leuk Esterase: Trace / RBC: 0-2 /HPF / WBC 0-2   Sq Epi: x / Non Sq Epi: Moderate / Bacteria: Few      < from: CT Angio Abdomen and Pelvis w/ IV Cont (11.15.22 @ 00:19) >  Cholelithiasis with suggestion of wall thickening and mild   pericholecystic inflammation. Acute cholecystitis is a diagnostic   consideration. Follow-up right upper quadrant sonogram and/or HIDA scan   may be obtained for further evaluation.    < end of copied text >

## 2022-11-15 NOTE — CONSULT NOTE ADULT - ASSESSMENT
A/P: 80 yo F with pmh COPD on 6L NC, colitis, PUD presents to  with acute onset of RUQ abdominal pain. Pt denies any event that provoked the episode, however states it became so bad she decided to come to  for additional chris/ and evaluation. In the ER, a sonogram revealed a distended gallbladder with presence of gallstones, indicating acute cholecystitis.    1. Preop optimization. Pt is optimized for urgent cholycysectomy from a cardiac standpoint. Pt has no active CP or signs of decompensation.  Pt had LHC 8/20 showing normal cors. 2Decho with mild AS and normal LV fxn.  BP/HR stable- cont outpt regimen.    2. COPD. Pulm eval pending preop. Pt uses 6L O2 at home.     3. HTN. BP stable. Cont current regimen.     4. DVT proph. Pain control.

## 2022-11-15 NOTE — H&P ADULT - ASSESSMENT
80 yo F presenting to  with acute cholecystitis  Admit to Dr. maravilla's service, med/surg   NPO/IVF/IV Michael Mcdonald on hold for OR, either today or tomorrow  Hospitalist consult  Cardiology consult - Dr. Way  Pulm consult- Dr. Gutierrez. Pt currently comofortable on 6L NC  DVT ppx  pain control    D.w Dr. Maravilla

## 2022-11-16 LAB
ANION GAP SERPL CALC-SCNC: 4 MMOL/L — LOW (ref 5–17)
BASOPHILS # BLD AUTO: 0.03 K/UL — SIGNIFICANT CHANGE UP (ref 0–0.2)
BASOPHILS NFR BLD AUTO: 0.2 % — SIGNIFICANT CHANGE UP (ref 0–2)
BUN SERPL-MCNC: 15 MG/DL — SIGNIFICANT CHANGE UP (ref 7–23)
CALCIUM SERPL-MCNC: 8.5 MG/DL — SIGNIFICANT CHANGE UP (ref 8.5–10.1)
CHLORIDE SERPL-SCNC: 104 MMOL/L — SIGNIFICANT CHANGE UP (ref 96–108)
CO2 SERPL-SCNC: 27 MMOL/L — SIGNIFICANT CHANGE UP (ref 22–31)
CREAT SERPL-MCNC: 0.87 MG/DL — SIGNIFICANT CHANGE UP (ref 0.5–1.3)
EGFR: 66 ML/MIN/1.73M2 — SIGNIFICANT CHANGE UP
EOSINOPHIL # BLD AUTO: 0 K/UL — SIGNIFICANT CHANGE UP (ref 0–0.5)
EOSINOPHIL NFR BLD AUTO: 0 % — SIGNIFICANT CHANGE UP (ref 0–6)
GLUCOSE SERPL-MCNC: 188 MG/DL — HIGH (ref 70–99)
HCT VFR BLD CALC: 31.5 % — LOW (ref 34.5–45)
HGB BLD-MCNC: 9.5 G/DL — LOW (ref 11.5–15.5)
IMM GRANULOCYTES NFR BLD AUTO: 0.5 % — SIGNIFICANT CHANGE UP (ref 0–0.9)
LYMPHOCYTES # BLD AUTO: 0.22 K/UL — LOW (ref 1–3.3)
LYMPHOCYTES # BLD AUTO: 1.6 % — LOW (ref 13–44)
MCHC RBC-ENTMCNC: 24.4 PG — LOW (ref 27–34)
MCHC RBC-ENTMCNC: 30.2 GM/DL — LOW (ref 32–36)
MCV RBC AUTO: 81 FL — SIGNIFICANT CHANGE UP (ref 80–100)
MONOCYTES # BLD AUTO: 0.26 K/UL — SIGNIFICANT CHANGE UP (ref 0–0.9)
MONOCYTES NFR BLD AUTO: 1.8 % — LOW (ref 2–14)
NEUTROPHILS # BLD AUTO: 13.53 K/UL — HIGH (ref 1.8–7.4)
NEUTROPHILS NFR BLD AUTO: 95.9 % — HIGH (ref 43–77)
PLATELET # BLD AUTO: 292 K/UL — SIGNIFICANT CHANGE UP (ref 150–400)
POTASSIUM SERPL-MCNC: 4 MMOL/L — SIGNIFICANT CHANGE UP (ref 3.5–5.3)
POTASSIUM SERPL-SCNC: 4 MMOL/L — SIGNIFICANT CHANGE UP (ref 3.5–5.3)
RBC # BLD: 3.89 M/UL — SIGNIFICANT CHANGE UP (ref 3.8–5.2)
RBC # FLD: 17.7 % — HIGH (ref 10.3–14.5)
SODIUM SERPL-SCNC: 135 MMOL/L — SIGNIFICANT CHANGE UP (ref 135–145)
WBC # BLD: 14.11 K/UL — HIGH (ref 3.8–10.5)
WBC # FLD AUTO: 14.11 K/UL — HIGH (ref 3.8–10.5)

## 2022-11-16 PROCEDURE — 99232 SBSQ HOSP IP/OBS MODERATE 35: CPT

## 2022-11-16 RX ORDER — LANOLIN ALCOHOL/MO/W.PET/CERES
3 CREAM (GRAM) TOPICAL AT BEDTIME
Refills: 0 | Status: DISCONTINUED | OUTPATIENT
Start: 2022-11-16 | End: 2022-11-22

## 2022-11-16 RX ORDER — INSULIN LISPRO 100/ML
6 VIAL (ML) SUBCUTANEOUS ONCE
Refills: 0 | Status: COMPLETED | OUTPATIENT
Start: 2022-11-16 | End: 2022-11-16

## 2022-11-16 RX ADMIN — Medication 1.5 GRAM(S): at 10:22

## 2022-11-16 RX ADMIN — GABAPENTIN 100 MILLIGRAM(S): 400 CAPSULE ORAL at 22:22

## 2022-11-16 RX ADMIN — HEPARIN SODIUM 5000 UNIT(S): 5000 INJECTION INTRAVENOUS; SUBCUTANEOUS at 22:22

## 2022-11-16 RX ADMIN — PIPERACILLIN AND TAZOBACTAM 25 GRAM(S): 4; .5 INJECTION, POWDER, LYOPHILIZED, FOR SOLUTION INTRAVENOUS at 05:42

## 2022-11-16 RX ADMIN — SODIUM CHLORIDE 75 MILLILITER(S): 9 INJECTION INTRAMUSCULAR; INTRAVENOUS; SUBCUTANEOUS at 05:42

## 2022-11-16 RX ADMIN — Medication 2: at 08:31

## 2022-11-16 RX ADMIN — BUDESONIDE AND FORMOTEROL FUMARATE DIHYDRATE 2 PUFF(S): 160; 4.5 AEROSOL RESPIRATORY (INHALATION) at 08:49

## 2022-11-16 RX ADMIN — Medication 3 MILLIGRAM(S): at 22:41

## 2022-11-16 RX ADMIN — Medication 300 MILLIGRAM(S): at 10:21

## 2022-11-16 RX ADMIN — Medication 6 UNIT(S): at 17:23

## 2022-11-16 RX ADMIN — Medication 40 MILLIGRAM(S): at 05:43

## 2022-11-16 RX ADMIN — Medication 240 MILLIGRAM(S): at 10:22

## 2022-11-16 RX ADMIN — Medication 6: at 17:21

## 2022-11-16 RX ADMIN — HEPARIN SODIUM 5000 UNIT(S): 5000 INJECTION INTRAVENOUS; SUBCUTANEOUS at 10:22

## 2022-11-16 RX ADMIN — SODIUM CHLORIDE 75 MILLILITER(S): 9 INJECTION INTRAMUSCULAR; INTRAVENOUS; SUBCUTANEOUS at 22:20

## 2022-11-16 RX ADMIN — SIMVASTATIN 20 MILLIGRAM(S): 20 TABLET, FILM COATED ORAL at 22:22

## 2022-11-16 RX ADMIN — PANTOPRAZOLE SODIUM 40 MILLIGRAM(S): 20 TABLET, DELAYED RELEASE ORAL at 05:42

## 2022-11-16 RX ADMIN — BUDESONIDE AND FORMOTEROL FUMARATE DIHYDRATE 2 PUFF(S): 160; 4.5 AEROSOL RESPIRATORY (INHALATION) at 20:15

## 2022-11-16 RX ADMIN — PIPERACILLIN AND TAZOBACTAM 25 GRAM(S): 4; .5 INJECTION, POWDER, LYOPHILIZED, FOR SOLUTION INTRAVENOUS at 22:31

## 2022-11-16 RX ADMIN — Medication 4: at 12:26

## 2022-11-16 RX ADMIN — PIPERACILLIN AND TAZOBACTAM 25 GRAM(S): 4; .5 INJECTION, POWDER, LYOPHILIZED, FOR SOLUTION INTRAVENOUS at 15:08

## 2022-11-16 RX ADMIN — TIOTROPIUM BROMIDE 1 CAPSULE(S): 18 CAPSULE ORAL; RESPIRATORY (INHALATION) at 08:50

## 2022-11-16 RX ADMIN — Medication 25 MILLIGRAM(S): at 10:22

## 2022-11-16 NOTE — PROGRESS NOTE ADULT - SUBJECTIVE AND OBJECTIVE BOX
Subjective:    pat better, lying in bed, surgery planned for tomorrow, taking po, no respiratory complaint, on 6lpm nc sat 91%    Home Medications:  allopurinol 300 mg oral tablet: 1 tab(s) orally once a day (15 Nov 2022 09:50)  Apriso 0.375 g oral capsule, extended release: 4 cap(s) orally once a day (15 Nov 2022 09:50)  Cartia  mg/24 hours oral capsule, extended release: 1 cap(s) orally once a day (15 Nov 2022 09:50)  CoQ10: orally once a day (15 Nov 2022 09:50)  dicyclomine 10 mg oral capsule: 1 cap(s) orally 3 times a day, As Needed (15 Nov 2022 09:50)  Eliquis 5 mg oral tablet: 1 tab(s) orally 2 times a day  Resume at 11pm tonight (20) (15 Nov 2022 09:50)  furosemide 20 mg oral tablet: 1 tab(s) orally once a day (15 Nov 2022 09:50)  gabapentin 100 mg oral tablet: 1 tab(s) orally once a day (at bedtime) (15 Nov 2022 09:50)  metFORMIN 500 mg oral tablet: 2 tab(s) orally once a day (in the morning) (15 Nov 2022 09:50)  metFORMIN 500 mg oral tablet: 1 tab(s) orally once (at bedtime) (15 Nov 2022 09:50)  Metoprolol Succinate ER 25 mg oral tablet, extended release: 1 tab(s) orally once a day (15 Nov 2022 09:50)  pantoprazole 40 mg oral delayed release tablet: 1 tab(s) orally once a day (15 Nov 2022 09:50)  Probiotic Formula oral capsule: 1 cap(s) orally 2 times a day (15 Nov 2022 09:50)  Prolia 60 mg/mL subcutaneous solution: subcutaneous every 6 months (15 Nov 2022 09:50)  simvastatin 20 mg oral tablet: 1 tab(s) orally once a day (at bedtime) (15 Nov 2022 09:50)  Trelegy Ellipta 100 mcg-62.5 mcg-25 mcg/inh inhalation powder: 1 puff(s) inhaled once a day (15 Nov 2022 09:50)  Vitamin D3 25 mcg (1000 intl units) oral tablet: 1 tab(s) orally once a day (15 Nov 2022 09:50)  zolpidem 5 mg oral tablet: 1 tab(s) orally once a day (at bedtime), As Needed (15 Nov 2022 09:50)    MEDICATIONS  (STANDING):  allopurinol 300 milliGRAM(s) Oral daily  budesonide  80 MICROgram(s)/formoterol 4.5 MICROgram(s) Inhaler 2 Puff(s) Inhalation two times a day  dextrose 5%. 1000 milliLiter(s) (50 mL/Hr) IV Continuous <Continuous>  dextrose 5%. 1000 milliLiter(s) (100 mL/Hr) IV Continuous <Continuous>  dextrose 50% Injectable 25 Gram(s) IV Push once  dextrose 50% Injectable 12.5 Gram(s) IV Push once  dextrose 50% Injectable 25 Gram(s) IV Push once  diltiazem    milliGRAM(s) Oral daily  gabapentin 100 milliGRAM(s) Oral at bedtime  glucagon  Injectable 1 milliGRAM(s) IntraMuscular once  heparin   Injectable 5000 Unit(s) SubCutaneous every 12 hours  insulin lispro (ADMELOG) corrective regimen sliding scale   SubCutaneous three times a day before meals  mesalamine ER (24-Hour) Capsule 1.5 Gram(s) Oral daily  methylPREDNISolone sodium succinate Injectable 40 milliGRAM(s) IV Push daily  metoprolol succinate ER 25 milliGRAM(s) Oral daily  pantoprazole    Tablet 40 milliGRAM(s) Oral before breakfast  piperacillin/tazobactam IVPB.. 3.375 Gram(s) IV Intermittent every 8 hours  simvastatin 20 milliGRAM(s) Oral at bedtime  sodium chloride 0.9%. 1000 milliLiter(s) (75 mL/Hr) IV Continuous <Continuous>  tiotropium 18 MICROgram(s) Capsule 1 Capsule(s) Inhalation daily    MEDICATIONS  (PRN):  dextrose Oral Gel 15 Gram(s) Oral once PRN Blood Glucose LESS THAN 70 milliGRAM(s)/deciliter  morphine  - Injectable 4 milliGRAM(s) IV Push every 4 hours PRN breakthrough pain  ondansetron Injectable 4 milliGRAM(s) IV Push every 6 hours PRN Nausea  oxycodone    5 mG/acetaminophen 325 mG 1 Tablet(s) Oral every 4 hours PRN Moderate Pain (4 - 6)  oxycodone    5 mG/acetaminophen 325 mG 2 Tablet(s) Oral every 6 hours PRN Severe Pain (7 - 10)      Allergies    aspirin (Stomach Upset)    Intolerances        Vital Signs Last 24 Hrs  T(C): 36.3 (2022 08:28), Max: 37.2 (15 Nov 2022 16:43)  T(F): 97.4 (2022 08:), Max: 98.9 (15 Nov 2022 16:43)  HR: 75 (:) (69 - 79)  BP: 121/41 (:) (120/73 - 129/49)  BP(mean): --  RR: 18 (:) (18 - 18)  SpO2: 90% (:) (90% - 94%)    Parameters below as of 2022 08:  Patient On (Oxygen Delivery Method): room air          PHYSICAL EXAMINATION:    NECK:  Supple. No lymphadenopathy. Jugular venous pressure not elevated. Carotids equal.   HEART:   The cardiac impulse has a normal quality. Reg., Nl S1 and S2.  There are no murmurs, rubs or gallops noted  CHEST:  Chest crackles to auscultation. Normal respiratory effort.  ABDOMEN:  Soft and nontender.   EXTREMITIES:  There is no edema.       LABS:                        9.5    14.11 )-----------( 292      ( 2022 08:01 )             31.5     11-16    135  |  104  |  15  ----------------------------<  188<H>  4.0   |  27  |  0.87    Ca    8.5      2022 08:01    TPro  7.5  /  Alb  3.6  /  TBili  0.5  /  DBili  x   /  AST  76<H>  /  ALT  37  /  AlkPhos  93  11-14    PT/INR - ( 2022 22:23 )   PT: 15.7 sec;   INR: 1.35 ratio         PTT - ( 2022 22:23 )  PTT:31.7 sec  Urinalysis Basic - ( 2022 22:23 )    Color: Yellow / Appearance: Clear / S.015 / pH: x  Gluc: x / Ketone: Negative  / Bili: Negative / Urobili: Negative   Blood: x / Protein: Negative / Nitrite: Negative   Leuk Esterase: Trace / RBC: 0-2 /HPF / WBC 0-2   Sq Epi: x / Non Sq Epi: Moderate / Bacteria: Few

## 2022-11-16 NOTE — PROGRESS NOTE ADULT - SUBJECTIVE AND OBJECTIVE BOX
Feels well, no new complaints  VSS afeb  Abd- + BS soft tender RUQ, mid epigastric, no rebound  Ext- no edema

## 2022-11-16 NOTE — PROGRESS NOTE ADULT - SUBJECTIVE AND OBJECTIVE BOX
CC: Abd pain  HPI:  81 year old woman with PMHx of COPD, chronic respiratory failure (on 6L NC), colitis, PUD, AS, DM2, HTN, HLD, ANU, Pafib presents with abdominal pain.  Denies any history of abd pain, onset is sudden, 10 out of 10 causing her to come to ED for further evaluation.  IN ED found to have acute choley.  SHe was given IV antibiotics, surgery notified and admitted to [facility].   At bedside pt given pain meds for pain control, she is weak, lethargic but comfortable, she is alert, answering all questions.  Pt Denies fever, chills, N, V, abd pain, CP, SOB.  Pt normally very active person.  Daughter at bedside.    22: Patient seen and examined. Sitting in chair, abd pain improving.     REVIEW OF SYSTEMS: All other review of systems is negative unless indicated above.      Vital Signs Last 24 Hrs  T(C): 36.3 (2022 08:28), Max: 37.2 (15 Nov 2022 16:43)  T(F): 97.4 (2022 08:28), Max: 98.9 (15 Nov 2022 16:43)  HR: 75 (2022 08:28) (69 - 79)  BP: 121/41 (2022 08:28) (120/73 - 129/49)  BP(mean): --  RR: 18 (2022 08:28) (18 - 18)  SpO2: 90% (2022 08:28) (90% - 94%)    Parameters below as of 2022 08:28  Patient On (Oxygen Delivery Method): room air            PHYSICAL EXAM:    Constitutional: NAD, awake and alert, well-developed  HEENT: PERR, EOMI, Normal Hearing, MMM  Neck: Soft and supple  Respiratory: Breath sounds are decreased b/l, NC in place  Cardiovascular: S1 and S2, regular rate and rhythm, + 3/6  Murmur, gallops or rubs  Gastrointestinal: Bowel Sounds present, soft, nontender, nondistended, no guarding, no rebound  Extremities: No peripheral edema  Neurological: A/O x 3, no focal deficits in my limited exam                                9.5     14.11 )-----------( 292      ( 2022 08:01 )             31.5     2022 08:01    135    |  104    |  15     ----------------------------<  188    4.0     |  27     |  0.87     Ca    8.5        2022 08:01    TPro  7.5    /  Alb  3.6    /  TBili  0.5    /  DBili  x      /  AST  76     /  ALT  37     /  AlkPhos  93     2022 22:23    LIVER FUNCTIONS - ( 2022 22:23 )  Alb: 3.6 g/dL / Pro: 7.5 gm/dL / ALK PHOS: 93 U/L / ALT: 37 U/L / AST: 76 U/L / GGT: x           PT/INR - ( 2022 22:23 )   PT: 15.7 sec;   INR: 1.35 ratio         PTT - ( 2022 22:23 )  PTT:31.7 sec  CAPILLARY BLOOD GLUCOSE      POCT Blood Glucose.: 348 mg/dL (2022 11:59)  POCT Blood Glucose.: 201 mg/dL (2022 07:36)  POCT Blood Glucose.: 302 mg/dL (15 Nov 2022 23:38)  POCT Blood Glucose.: 204 mg/dL (15 Nov 2022 16:19)        Urinalysis Basic - ( 2022 22:23 )    Color: Yellow / Appearance: Clear / S.015 / pH: x  Gluc: x / Ketone: Negative  / Bili: Negative / Urobili: Negative   Blood: x / Protein: Negative / Nitrite: Negative   Leuk Esterase: Trace / RBC: 0-2 /HPF / WBC 0-2   Sq Epi: x / Non Sq Epi: Moderate / Bacteria: Few      MEDICATIONS  (STANDING):  allopurinol 300 milliGRAM(s) Oral daily  budesonide  80 MICROgram(s)/formoterol 4.5 MICROgram(s) Inhaler 2 Puff(s) Inhalation two times a day  dextrose 5%. 1000 milliLiter(s) (50 mL/Hr) IV Continuous <Continuous>  dextrose 5%. 1000 milliLiter(s) (100 mL/Hr) IV Continuous <Continuous>  dextrose 50% Injectable 25 Gram(s) IV Push once  dextrose 50% Injectable 12.5 Gram(s) IV Push once  dextrose 50% Injectable 25 Gram(s) IV Push once  diltiazem    milliGRAM(s) Oral daily  gabapentin 100 milliGRAM(s) Oral at bedtime  glucagon  Injectable 1 milliGRAM(s) IntraMuscular once  heparin   Injectable 5000 Unit(s) SubCutaneous every 12 hours  insulin lispro (ADMELOG) corrective regimen sliding scale   SubCutaneous three times a day before meals  mesalamine ER (24-Hour) Capsule 1.5 Gram(s) Oral daily  methylPREDNISolone sodium succinate Injectable 40 milliGRAM(s) IV Push daily  metoprolol succinate ER 25 milliGRAM(s) Oral daily  pantoprazole    Tablet 40 milliGRAM(s) Oral before breakfast  piperacillin/tazobactam IVPB.. 3.375 Gram(s) IV Intermittent every 8 hours  simvastatin 20 milliGRAM(s) Oral at bedtime  sodium chloride 0.9%. 1000 milliLiter(s) (75 mL/Hr) IV Continuous <Continuous>  tiotropium 18 MICROgram(s) Capsule 1 Capsule(s) Inhalation daily    MEDICATIONS  (PRN):  dextrose Oral Gel 15 Gram(s) Oral once PRN Blood Glucose LESS THAN 70 milliGRAM(s)/deciliter  morphine  - Injectable 4 milliGRAM(s) IV Push every 4 hours PRN breakthrough pain  ondansetron Injectable 4 milliGRAM(s) IV Push every 6 hours PRN Nausea  oxycodone    5 mG/acetaminophen 325 mG 1 Tablet(s) Oral every 4 hours PRN Moderate Pain (4 - 6)  oxycodone    5 mG/acetaminophen 325 mG 2 Tablet(s) Oral every 6 hours PRN Severe Pain (7 - 10)            81 year old woman with PMHx of COPD, chronic respiratory failure (on 6L NC), colitis, PUD, AS, DM2, HTN, HLD, ANU, P afib presents with abdominal pain.  Denies any history of abd pain, onset is sudden, 10 out of 10 causing her to come to ED for further evaluation.  IN ED found to have acute choley.  SHe was given IV antibiotics, surgery notified and admitted to [facility].   At bedside pt given pain meds for pain control, she is weak, lethargic but comfortable, she is alert, answering all questions.  Pt Denies fever, chills, N, V, abd pain, CP, SOB.  Pt normally very active person.  Daughter at bedside.        1. Acute cholecystitis-  Continue IV zosyn  Pain medications  IV fluid  Possible OR tomorrow  Further management as per surgery.    2. Acute on chr hypoxic resp failure  COPD exacerbation   Improving  Continue IV solumedrol  Dr Gutierrez follows.  O2 support    3. HTN-0stable  Continue outpatient BP meds    4. DM-2  On FS with coverage    5. DVT prophylaxis.    Patient is medically stable and optimized for OR  No absolute contraindications.

## 2022-11-17 ENCOUNTER — RESULT REVIEW (OUTPATIENT)
Age: 82
End: 2022-11-17

## 2022-11-17 LAB
ALBUMIN SERPL ELPH-MCNC: 2.5 G/DL — LOW (ref 3.3–5)
ALP SERPL-CCNC: 138 U/L — HIGH (ref 40–120)
ALT FLD-CCNC: 344 U/L — HIGH (ref 12–78)
ANION GAP SERPL CALC-SCNC: 4 MMOL/L — LOW (ref 5–17)
AST SERPL-CCNC: 127 U/L — HIGH (ref 15–37)
BASOPHILS # BLD AUTO: 0.01 K/UL — SIGNIFICANT CHANGE UP (ref 0–0.2)
BASOPHILS NFR BLD AUTO: 0.1 % — SIGNIFICANT CHANGE UP (ref 0–2)
BILIRUB SERPL-MCNC: 0.3 MG/DL — SIGNIFICANT CHANGE UP (ref 0.2–1.2)
BUN SERPL-MCNC: 14 MG/DL — SIGNIFICANT CHANGE UP (ref 7–23)
CALCIUM SERPL-MCNC: 8.1 MG/DL — LOW (ref 8.5–10.1)
CHLORIDE SERPL-SCNC: 111 MMOL/L — HIGH (ref 96–108)
CO2 SERPL-SCNC: 26 MMOL/L — SIGNIFICANT CHANGE UP (ref 22–31)
CREAT SERPL-MCNC: 0.84 MG/DL — SIGNIFICANT CHANGE UP (ref 0.5–1.3)
EGFR: 69 ML/MIN/1.73M2 — SIGNIFICANT CHANGE UP
EOSINOPHIL # BLD AUTO: 0 K/UL — SIGNIFICANT CHANGE UP (ref 0–0.5)
EOSINOPHIL NFR BLD AUTO: 0 % — SIGNIFICANT CHANGE UP (ref 0–6)
GLUCOSE SERPL-MCNC: 161 MG/DL — HIGH (ref 70–99)
HCT VFR BLD CALC: 28 % — LOW (ref 34.5–45)
HGB BLD-MCNC: 8.8 G/DL — LOW (ref 11.5–15.5)
IMM GRANULOCYTES NFR BLD AUTO: 0.5 % — SIGNIFICANT CHANGE UP (ref 0–0.9)
LYMPHOCYTES # BLD AUTO: 0.32 K/UL — LOW (ref 1–3.3)
LYMPHOCYTES # BLD AUTO: 2.5 % — LOW (ref 13–44)
MCHC RBC-ENTMCNC: 25 PG — LOW (ref 27–34)
MCHC RBC-ENTMCNC: 31.4 GM/DL — LOW (ref 32–36)
MCV RBC AUTO: 79.5 FL — LOW (ref 80–100)
MONOCYTES # BLD AUTO: 0.65 K/UL — SIGNIFICANT CHANGE UP (ref 0–0.9)
MONOCYTES NFR BLD AUTO: 5.1 % — SIGNIFICANT CHANGE UP (ref 2–14)
NEUTROPHILS # BLD AUTO: 11.74 K/UL — HIGH (ref 1.8–7.4)
NEUTROPHILS NFR BLD AUTO: 91.8 % — HIGH (ref 43–77)
PLATELET # BLD AUTO: 237 K/UL — SIGNIFICANT CHANGE UP (ref 150–400)
POTASSIUM SERPL-MCNC: 3.8 MMOL/L — SIGNIFICANT CHANGE UP (ref 3.5–5.3)
POTASSIUM SERPL-SCNC: 3.8 MMOL/L — SIGNIFICANT CHANGE UP (ref 3.5–5.3)
PROT SERPL-MCNC: 5.9 GM/DL — LOW (ref 6–8.3)
RBC # BLD: 3.52 M/UL — LOW (ref 3.8–5.2)
RBC # FLD: 17.7 % — HIGH (ref 10.3–14.5)
SODIUM SERPL-SCNC: 141 MMOL/L — SIGNIFICANT CHANGE UP (ref 135–145)
WBC # BLD: 12.78 K/UL — HIGH (ref 3.8–10.5)
WBC # FLD AUTO: 12.78 K/UL — HIGH (ref 3.8–10.5)

## 2022-11-17 PROCEDURE — 88304 TISSUE EXAM BY PATHOLOGIST: CPT | Mod: 26

## 2022-11-17 PROCEDURE — 12345: CPT | Mod: NC,AS

## 2022-11-17 PROCEDURE — 99232 SBSQ HOSP IP/OBS MODERATE 35: CPT

## 2022-11-17 RX ORDER — OXYCODONE HYDROCHLORIDE 5 MG/1
5 TABLET ORAL ONCE
Refills: 0 | Status: DISCONTINUED | OUTPATIENT
Start: 2022-11-17 | End: 2022-11-17

## 2022-11-17 RX ORDER — ACETAMINOPHEN 500 MG
325 TABLET ORAL EVERY 4 HOURS
Refills: 0 | Status: DISCONTINUED | OUTPATIENT
Start: 2022-11-17 | End: 2022-11-22

## 2022-11-17 RX ORDER — MORPHINE SULFATE 50 MG/1
4 CAPSULE, EXTENDED RELEASE ORAL EVERY 4 HOURS
Refills: 0 | Status: DISCONTINUED | OUTPATIENT
Start: 2022-11-17 | End: 2022-11-22

## 2022-11-17 RX ORDER — ONDANSETRON 8 MG/1
4 TABLET, FILM COATED ORAL ONCE
Refills: 0 | Status: DISCONTINUED | OUTPATIENT
Start: 2022-11-17 | End: 2022-11-17

## 2022-11-17 RX ORDER — SODIUM CHLORIDE 9 MG/ML
1000 INJECTION INTRAMUSCULAR; INTRAVENOUS; SUBCUTANEOUS
Refills: 0 | Status: DISCONTINUED | OUTPATIENT
Start: 2022-11-17 | End: 2022-11-18

## 2022-11-17 RX ORDER — FENTANYL CITRATE 50 UG/ML
50 INJECTION INTRAVENOUS
Refills: 0 | Status: DISCONTINUED | OUTPATIENT
Start: 2022-11-17 | End: 2022-11-17

## 2022-11-17 RX ADMIN — PIPERACILLIN AND TAZOBACTAM 25 GRAM(S): 4; .5 INJECTION, POWDER, LYOPHILIZED, FOR SOLUTION INTRAVENOUS at 23:51

## 2022-11-17 RX ADMIN — FENTANYL CITRATE 50 MICROGRAM(S): 50 INJECTION INTRAVENOUS at 17:07

## 2022-11-17 RX ADMIN — OXYCODONE HYDROCHLORIDE 5 MILLIGRAM(S): 5 TABLET ORAL at 17:08

## 2022-11-17 RX ADMIN — Medication 40 MILLIGRAM(S): at 11:22

## 2022-11-17 RX ADMIN — TIOTROPIUM BROMIDE 1 CAPSULE(S): 18 CAPSULE ORAL; RESPIRATORY (INHALATION) at 08:29

## 2022-11-17 RX ADMIN — SODIUM CHLORIDE 75 MILLILITER(S): 9 INJECTION INTRAMUSCULAR; INTRAVENOUS; SUBCUTANEOUS at 11:20

## 2022-11-17 RX ADMIN — Medication 1: at 12:14

## 2022-11-17 RX ADMIN — MORPHINE SULFATE 4 MILLIGRAM(S): 50 CAPSULE, EXTENDED RELEASE ORAL at 23:51

## 2022-11-17 RX ADMIN — GABAPENTIN 100 MILLIGRAM(S): 400 CAPSULE ORAL at 23:05

## 2022-11-17 RX ADMIN — Medication 1: at 05:34

## 2022-11-17 RX ADMIN — SODIUM CHLORIDE 100 MILLILITER(S): 9 INJECTION INTRAMUSCULAR; INTRAVENOUS; SUBCUTANEOUS at 17:32

## 2022-11-17 RX ADMIN — Medication 2: at 19:30

## 2022-11-17 RX ADMIN — Medication 300 MILLIGRAM(S): at 11:22

## 2022-11-17 RX ADMIN — PIPERACILLIN AND TAZOBACTAM 25 GRAM(S): 4; .5 INJECTION, POWDER, LYOPHILIZED, FOR SOLUTION INTRAVENOUS at 05:35

## 2022-11-17 RX ADMIN — Medication 25 MILLIGRAM(S): at 11:27

## 2022-11-17 RX ADMIN — BUDESONIDE AND FORMOTEROL FUMARATE DIHYDRATE 2 PUFF(S): 160; 4.5 AEROSOL RESPIRATORY (INHALATION) at 08:30

## 2022-11-17 RX ADMIN — MORPHINE SULFATE 4 MILLIGRAM(S): 50 CAPSULE, EXTENDED RELEASE ORAL at 23:05

## 2022-11-17 RX ADMIN — Medication 3 MILLIGRAM(S): at 23:06

## 2022-11-17 RX ADMIN — OXYCODONE HYDROCHLORIDE 5 MILLIGRAM(S): 5 TABLET ORAL at 16:38

## 2022-11-17 RX ADMIN — Medication 1.5 GRAM(S): at 11:22

## 2022-11-17 RX ADMIN — SIMVASTATIN 20 MILLIGRAM(S): 20 TABLET, FILM COATED ORAL at 23:06

## 2022-11-17 RX ADMIN — FENTANYL CITRATE 50 MICROGRAM(S): 50 INJECTION INTRAVENOUS at 16:37

## 2022-11-17 NOTE — PROVIDER CONTACT NOTE (MEDICATION) - ASSESSMENT
patient is A&O x 4, vitals stable, no c/o pain, IVF running @ 75cc/hr, denies any chest pain or any respiratory distress.

## 2022-11-17 NOTE — PROGRESS NOTE ADULT - SUBJECTIVE AND OBJECTIVE BOX
CC: Abd pain  HPI:  81 year old woman with PMHx of COPD, chronic respiratory failure (on 6L NC), colitis, PUD, AS, DM2, HTN, HLD, ANU, Pafib presents with abdominal pain.  Denies any history of abd pain, onset is sudden, 10 out of 10 causing her to come to ED for further evaluation.  IN ED found to have acute choley.  SHe was given IV antibiotics, surgery notified and admitted to [facility].   At bedside pt given pain meds for pain control, she is weak, lethargic but comfortable, she is alert, answering all questions.  Pt Denies fever, chills, N, V, abd pain, CP, SOB.  Pt normally very active person.  Daughter at bedside.    11/16/22: Patient seen and examined. Sitting in chair, abd pain improving.   11/17/22: No new issues. OR today    REVIEW OF SYSTEMS: All other review of systems is negative unless indicated above.      Vital Signs Last 24 Hrs  T(C): 36.8 (17 Nov 2022 12:36), Max: 36.9 (17 Nov 2022 08:14)  T(F): 98.2 (17 Nov 2022 12:36), Max: 98.4 (17 Nov 2022 08:14)  HR: 66 (17 Nov 2022 12:36) (60 - 85)  BP: 135/48 (17 Nov 2022 11:25) (114/36 - 135/48)  BP(mean): --  RR: 18 (17 Nov 2022 12:36) (18 - 18)  SpO2: 98% (17 Nov 2022 12:36) (94% - 98%)    Parameters below as of 17 Nov 2022 11:25  Patient On (Oxygen Delivery Method): room air            PHYSICAL EXAM:    Constitutional: NAD, awake and alert, well-developed  HEENT: PERR, EOMI, Normal Hearing, MMM  Neck: Soft and supple  Respiratory: Breath sounds are decreased b/l, NC in place  Cardiovascular: S1 and S2, regular rate and rhythm, + 3/6  Murmur, gallops or rubs  Gastrointestinal: Bowel Sounds present, soft, nontender, nondistended, no guarding, no rebound  Extremities: No peripheral edema  Neurological: A/O x 3, no focal deficits in my limited exam                              8.8    12.78 )-----------( 237      ( 17 Nov 2022 08:15 )             28.0     17 Nov 2022 08:15    141    |  111    |  14     ----------------------------<  161    3.8     |  26     |  0.84     Ca    8.1        17 Nov 2022 08:15    TPro  5.9    /  Alb  2.5    /  TBili  0.3    /  DBili  x      /  AST  127    /  ALT  344    /  AlkPhos  138    17 Nov 2022 08:15    LIVER FUNCTIONS - ( 17 Nov 2022 08:15 )  Alb: 2.5 g/dL / Pro: 5.9 gm/dL / ALK PHOS: 138 U/L / ALT: 344 U/L / AST: 127 U/L / GGT: x             CAPILLARY BLOOD GLUCOSE      POCT Blood Glucose.: 157 mg/dL (17 Nov 2022 11:53)  POCT Blood Glucose.: 184 mg/dL (17 Nov 2022 05:31)  POCT Blood Glucose.: 184 mg/dL (16 Nov 2022 22:29)  POCT Blood Glucose.: 440 mg/dL (16 Nov 2022 17:18)  POCT Blood Glucose.: 468 mg/dL (16 Nov 2022 17:16)            MEDICATIONS  (STANDING):  allopurinol 300 milliGRAM(s) Oral daily  budesonide  80 MICROgram(s)/formoterol 4.5 MICROgram(s) Inhaler 2 Puff(s) Inhalation two times a day  dextrose 5%. 1000 milliLiter(s) (50 mL/Hr) IV Continuous <Continuous>  dextrose 5%. 1000 milliLiter(s) (100 mL/Hr) IV Continuous <Continuous>  dextrose 50% Injectable 25 Gram(s) IV Push once  dextrose 50% Injectable 12.5 Gram(s) IV Push once  dextrose 50% Injectable 25 Gram(s) IV Push once  diltiazem    milliGRAM(s) Oral daily  gabapentin 100 milliGRAM(s) Oral at bedtime  glucagon  Injectable 1 milliGRAM(s) IntraMuscular once  heparin   Injectable 5000 Unit(s) SubCutaneous every 12 hours  insulin lispro (ADMELOG) corrective regimen sliding scale   SubCutaneous three times a day before meals  mesalamine ER (24-Hour) Capsule 1.5 Gram(s) Oral daily  methylPREDNISolone sodium succinate Injectable 40 milliGRAM(s) IV Push daily  metoprolol succinate ER 25 milliGRAM(s) Oral daily  pantoprazole    Tablet 40 milliGRAM(s) Oral before breakfast  piperacillin/tazobactam IVPB.. 3.375 Gram(s) IV Intermittent every 8 hours  simvastatin 20 milliGRAM(s) Oral at bedtime  sodium chloride 0.9%. 1000 milliLiter(s) (75 mL/Hr) IV Continuous <Continuous>  tiotropium 18 MICROgram(s) Capsule 1 Capsule(s) Inhalation daily    MEDICATIONS  (PRN):  dextrose Oral Gel 15 Gram(s) Oral once PRN Blood Glucose LESS THAN 70 milliGRAM(s)/deciliter  morphine  - Injectable 4 milliGRAM(s) IV Push every 4 hours PRN breakthrough pain  ondansetron Injectable 4 milliGRAM(s) IV Push every 6 hours PRN Nausea  oxycodone    5 mG/acetaminophen 325 mG 1 Tablet(s) Oral every 4 hours PRN Moderate Pain (4 - 6)  oxycodone    5 mG/acetaminophen 325 mG 2 Tablet(s) Oral every 6 hours PRN Severe Pain (7 - 10)            81 year old woman with PMHx of COPD, chronic respiratory failure (on 6L NC), colitis, PUD, AS, DM2, HTN, HLD, ANU, P afib presents with abdominal pain.  Denies any history of abd pain, onset is sudden, 10 out of 10 causing her to come to ED for further evaluation.  IN ED found to have acute choley.  SHe was given IV antibiotics, surgery notified and admitted to [facility].   At bedside pt given pain meds for pain control, she is weak, lethargic but comfortable, she is alert, answering all questions.  Pt Denies fever, chills, N, V, abd pain, CP, SOB.  Pt normally very active person.  Daughter at bedside.        1. Acute cholecystitis-  Continue IV zosyn  Pain medications  IV fluid  Possible OR today  Further management as per surgery.    2. Acute on chr hypoxic resp failure  COPD exacerbation-stable  Continue IV solumedrol  Dr Gutierrez follows.  O2 support    3. HTN-stable  Continue outpatient BP meds    4. DM-2  On FS with coverage    5. DVT prophylaxis.    Patient is medically stable and optimized for OR  No absolute contraindications.

## 2022-11-17 NOTE — CONSULT NOTE ADULT - SUBJECTIVE AND OBJECTIVE BOX
Cardiology Consultation    HPI: 80 yo F with pmh COPD on 6L NC, colitis, PUD presents to  with acute onset of RUQ abdominal pain. Pt denies any event that provoked the episode, however states it became so bad she decided to come to  for additional chris/ and evaluation. In the ER, a sonogram revealed a distended gallbladder with presence of gallstones, indicating acute cholecystitis and a surgical consultation.  pt denies any previous h/o these attacks.  She has a past surgical hx of an open appendectomy when she was 16. No other abx sx reported.  She has NKDA. (15 Nov 2022 06:23)    11/15. Chart reviewed. Pt with COPD on home O2, mild AS, HTN.  No h/o CAD/CHF/CABG. Case d/w daughter at bedside. No CP. Chronic SOB.  +Abd pain. Awaiting choly.     PAST MEDICAL & SURGICAL HISTORY:  HTN (hypertension)  HLD (hyperlipidemia)  DM (diabetes mellitus)  Paroxysmal A-fib  on Eliquis  ANU (obstructive sleep apnea)  on CPAP  H/O gastroesophageal reflux (GERD)  History of ulcerative colitis  S/P appendectomy    Allergies  aspirin (Stomach Upset)    SOCIAL HISTORY: Denies tobacco, etoh abuse or illicit drug use    FAMILY HISTORY:  FH: HTN (hypertension)  mother, father    FH: stroke  mother    FH: CHF (congestive heart failure)  father    FH: cancer  brother    MEDICATIONS  (STANDING):  allopurinol 300 milliGRAM(s) Oral daily  budesonide  80 MICROgram(s)/formoterol 4.5 MICROgram(s) Inhaler 2 Puff(s) Inhalation two times a day  dextrose 5%. 1000 milliLiter(s) (50 mL/Hr) IV Continuous <Continuous>  dextrose 5%. 1000 milliLiter(s) (100 mL/Hr) IV Continuous <Continuous>  dextrose 50% Injectable 25 Gram(s) IV Push once  dextrose 50% Injectable 12.5 Gram(s) IV Push once  dextrose 50% Injectable 25 Gram(s) IV Push once  diltiazem    milliGRAM(s) Oral daily  gabapentin 100 milliGRAM(s) Oral at bedtime  glucagon  Injectable 1 milliGRAM(s) IntraMuscular once  insulin lispro (ADMELOG) corrective regimen sliding scale   SubCutaneous three times a day before meals  mesalamine ER (24-Hour) Capsule 1.5 Gram(s) Oral daily  methylPREDNISolone sodium succinate Injectable 40 milliGRAM(s) IV Push every 8 hours  metoprolol succinate ER 25 milliGRAM(s) Oral daily  pantoprazole    Tablet 40 milliGRAM(s) Oral before breakfast  piperacillin/tazobactam IVPB.- 3.375 Gram(s) IV Intermittent once  piperacillin/tazobactam IVPB.. 3.375 Gram(s) IV Intermittent every 8 hours  simvastatin 20 milliGRAM(s) Oral at bedtime  sodium chloride 0.9%. 1000 milliLiter(s) (75 mL/Hr) IV Continuous <Continuous>  tiotropium 18 MICROgram(s) Capsule 1 Capsule(s) Inhalation daily    MEDICATIONS  (PRN):  dextrose Oral Gel 15 Gram(s) Oral once PRN Blood Glucose LESS THAN 70 milliGRAM(s)/deciliter  morphine  - Injectable 4 milliGRAM(s) IV Push every 4 hours PRN breakthrough pain  ondansetron Injectable 4 milliGRAM(s) IV Push every 6 hours PRN Nausea  oxycodone    5 mG/acetaminophen 325 mG 1 Tablet(s) Oral every 4 hours PRN Moderate Pain (4 - 6)  oxycodone    5 mG/acetaminophen 325 mG 2 Tablet(s) Oral every 6 hours PRN Severe Pain (7 - 10)      Vital Signs Last 24 Hrs  T(C): 37.1 (15 Nov 2022 06:58), Max: 37.1 (15 Nov 2022 03:00)  T(F): 98.7 (15 Nov 2022 06:58), Max: 98.7 (15 Nov 2022 03:00)  HR: 95 (15 Nov 2022 07:20) (73 - 95)  BP: 121/61 (15 Nov 2022 07:20) (121/61 - 151/57)  BP(mean): 78 (15 Nov 2022 07:20) (78 - 83)  RR: 24 (15 Nov 2022 06:58) (17 - 24)  SpO2: 94% (15 Nov 2022 06:58) (89% - 95%)    Parameters below as of 15 Nov 2022 06:58  Patient On (Oxygen Delivery Method): nasal cannula  O2 Flow (L/min): 8      REVIEW OF SYSTEMS:    CONSTITUTIONAL:  As per HPI.  HEENT:  Eyes:  No diplopia or blurred vision. ENT:  No earache, sore throat or runny nose.  CARDIOVASCULAR:  No pressure, squeezing, strangling, tightness, heaviness or aching about the chest, neck, axilla or epigastrium.  RESPIRATORY:  No cough, shortness of breath, PND or orthopnea.  GASTROINTESTINAL:  +Abd pain.  GENITOURINARY:  No dysuria, frequency or urgency.  MUSCULOSKELETAL:  As per HPI.  SKIN:  No change in skin, hair or nails.  NEUROLOGIC:  No paresthesias, fasciculations, seizures or weakness.    PHYSICAL EXAMINATION:    GENERAL APPEARANCE:  Pt. is not currently dyspneic, in no distress. Pt. is alert, oriented, and pleasant.  HEENT:  Pupils are normal and react normally. No icterus. Mucous membranes well colored.  NECK:  Supple. No lymphadenopathy. Jugular venous pressure not elevated. Carotids equal.   HEART:   The cardiac impulse has a normal quality. There are no gallops noted. 2/6 JOSE ANGEL LUSB.  CHEST:  Chest is clear to auscultation. Normal respiratory effort.  ABDOMEN:  Soft and nontender.   EXTREMITIES:  There is no edema.     LABS:                        9.8    11.52 )-----------( 346      ( 2022 22:23 )             31.5     11-14    141  |  104  |  19  ----------------------------<  183<H>  3.9   |  27  |  1.38<H>    Ca    9.3      2022 22:23    TPro  7.5  /  Alb  3.6  /  TBili  0.5  /  DBili  x   /  AST  76<H>  /  ALT  37  /  AlkPhos  93  -14    LIVER FUNCTIONS - ( 2022 22:23 )  Alb: 3.6 g/dL / Pro: 7.5 gm/dL / ALK PHOS: 93 U/L / ALT: 37 U/L / AST: 76 U/L / GGT: x           PT/INR - ( 2022 22:23 )   PT: 15.7 sec;   INR: 1.35 ratio         PTT - ( 2022 22:23 )  PTT:31.7 sec      Urinalysis Basic - ( 2022 22:23 )    Color: Yellow / Appearance: Clear / S.015 / pH: x  Gluc: x / Ketone: Negative  / Bili: Negative / Urobili: Negative   Blood: x / Protein: Negative / Nitrite: Negative   Leuk Esterase: Trace / RBC: 0-2 /HPF / WBC 0-2   Sq Epi: x / Non Sq Epi: Moderate / Bacteria: Few    EKG: < from: 12 Lead ECG (20 @ 14:27) >  Normal sinus rhythm  T wave abnormality, consider anterior ischemia  Abnormal ECG    TELEMETRY: SR    CARDIAC TESTS: < from: Cardiac Cath Lab - Adult (20 @ 15:00) >  Angiographic Findings     Cardiac Arteries and Lesion Findings    LMCA: Large caliber vessel. Normal.    LAD: Medium caliber vessel. Normal.    LCx: Large caliber vessel. Normal.    RCA: Medium caliber vessel. Normal.    Ramus: Small caliber vessel. There is a 80% stenosis noted in the ostial  portion of the vessel.    VA  LV function assessed as:Normal.    < end of copied text >      RADIOLOGY & ADDITIONAL STUDIES: r< from: CT Angio Abdomen and Pelvis w/ IV Cont (11.15.22 @ 00:19) >  IMPRESSION:  No CT evidence of mesenteric ischemia.    Cholelithiasis with suggestion of wall thickening and mild   pericholecystic inflammation. Acute cholecystitis is a diagnostic   consideration. Follow-up right upper quadrant sonogram and/or HIDA scan   may be obtained for further evaluation.      < end of copied text >  < from: US Abdomen Upper Quadrant Right (11.15.22 @ 03:19) >    Distended gallbladder with thickened edematous gallbladder wall and   multiple small gallstones. These findings suggest a very high positive   predictive value for acute cholecystitis. Recommend clinical and lab   correlation.    < end of copied text >      ASSESSMENT & PLAN:
  HPI:    81 y.o.f with pmh COPD on 6L NC, colitis, PUD admitted with acute onset of RUQ abdominal pain. Pt denies any event that provoked the episode, however states it became so bad she decided to come to  for additional chris/ and evaluation. In the ER, a sonogram revealed a distended gallbladder with presence of gallstones, indicating acute cholecystitis and a surgical consultation.  pt denies any previous h/o these attacks.  She has a past surgical hx of an open appendectomy when she was 16. No other abx sx reported.  She has NKDA. pat seen for pulmonary eval, with history of COPD.      PAST MEDICAL & SURGICAL HISTORY:  HTN (hypertension)      HLD (hyperlipidemia)      DM (diabetes mellitus)      Paroxysmal A-fib  on Eliquis      ANU (obstructive sleep apnea)  on CPAP      H/O gastroesophageal reflux (GERD)      History of ulcerative colitis      S/P appendectomy          Home Medications:  allopurinol 300 mg oral tablet: 1 tab(s) orally once a day (15 Nov 2022 06:17)  Apriso 0.375 g oral capsule, extended release: 4 cap(s) orally once a day (15 Nov 2022 06:17)  Cartia  mg/24 hours oral capsule, extended release: 1 cap(s) orally once a day (15 Nov 2022 06:17)  CoQ10: orally once a day (15 Nov 2022 06:17)  Eliquis 5 mg oral tablet: 1 tab(s) orally 2 times a day  Resume at 11pm tonight (20) (15 Nov 2022 06:17)  gabapentin 100 mg oral tablet: 1 tab(s) orally once a day (at bedtime) (15 Nov 2022 06:17)  metFORMIN 500 mg oral tablet: 1 tab(s) orally 2 times a day  Resume on 2020. (19 Aug 2020 15:35)  Metoprolol Succinate ER 25 mg oral tablet, extended release: 1 tab(s) orally once a day (19 Aug 2020 12:30)  pantoprazole 40 mg oral delayed release tablet: 1 tab(s) orally 2 times a day (19 Aug 2020 12:30)  Probiotic Formula oral capsule: 1 cap(s) orally once a day (19 Aug 2020 12:30)  Prolia 60 mg/mL subcutaneous solution: subcutaneous every 6 months (19 Aug 2020 12:30)  simvastatin 20 mg oral tablet: 1 tab(s) orally once a day (at bedtime) (19 Aug 2020 12:30)      MEDICATIONS  (STANDING):  allopurinol 300 milliGRAM(s) Oral daily  budesonide  80 MICROgram(s)/formoterol 4.5 MICROgram(s) Inhaler 2 Puff(s) Inhalation two times a day  dextrose 5%. 1000 milliLiter(s) (50 mL/Hr) IV Continuous <Continuous>  dextrose 5%. 1000 milliLiter(s) (100 mL/Hr) IV Continuous <Continuous>  dextrose 50% Injectable 25 Gram(s) IV Push once  dextrose 50% Injectable 12.5 Gram(s) IV Push once  dextrose 50% Injectable 25 Gram(s) IV Push once  diltiazem    milliGRAM(s) Oral daily  gabapentin 100 milliGRAM(s) Oral at bedtime  glucagon  Injectable 1 milliGRAM(s) IntraMuscular once  insulin lispro (ADMELOG) corrective regimen sliding scale   SubCutaneous three times a day before meals  mesalamine ER (24-Hour) Capsule 1.5 Gram(s) Oral daily  metoprolol succinate ER 25 milliGRAM(s) Oral daily  pantoprazole    Tablet 40 milliGRAM(s) Oral before breakfast  piperacillin/tazobactam IVPB.- 3.375 Gram(s) IV Intermittent once  piperacillin/tazobactam IVPB.. 3.375 Gram(s) IV Intermittent every 8 hours  simvastatin 20 milliGRAM(s) Oral at bedtime  sodium chloride 0.9%. 1000 milliLiter(s) (75 mL/Hr) IV Continuous <Continuous>  tiotropium 18 MICROgram(s) Capsule 1 Capsule(s) Inhalation daily    MEDICATIONS  (PRN):  dextrose Oral Gel 15 Gram(s) Oral once PRN Blood Glucose LESS THAN 70 milliGRAM(s)/deciliter  morphine  - Injectable 4 milliGRAM(s) IV Push every 4 hours PRN breakthrough pain  ondansetron Injectable 4 milliGRAM(s) IV Push every 6 hours PRN Nausea  oxycodone    5 mG/acetaminophen 325 mG 1 Tablet(s) Oral every 4 hours PRN Moderate Pain (4 - 6)  oxycodone    5 mG/acetaminophen 325 mG 2 Tablet(s) Oral every 6 hours PRN Severe Pain (7 - 10)      Allergies    aspirin (Stomach Upset)    Intolerances        SOCIAL HISTORY: Denies tobacco, etoh abuse or illicit drug use    FAMILY HISTORY:  FH: HTN (hypertension)  mother, father    FH: stroke  mother    FH: CHF (congestive heart failure)  father    FH: cancer  brother        Vital Signs Last 24 Hrs  T(C): 37.1 (15 Nov 2022 06:58), Max: 37.1 (15 Nov 2022 03:00)  T(F): 98.7 (15 Nov 2022 06:58), Max: 98.7 (15 Nov 2022 03:00)  HR: 95 (15 Nov 2022 07:20) (73 - 95)  BP: 121/61 (15 Nov 2022 07:20) (121/61 - 151/57)  BP(mean): 78 (15 Nov 2022 07:20) (78 - 83)  RR: 24 (15 Nov 2022 06:58) (17 - 24)  SpO2: 94% (15 Nov 2022 06:58) (89% - 95%)    Parameters below as of 15 Nov 2022 06:58  Patient On (Oxygen Delivery Method): nasal cannula  O2 Flow (L/min): 8          REVIEW OF SYSTEMS:    CONSTITUTIONAL:  As per HPI.  HEENT:  Eyes:  No diplopia or blurred vision. ENT:  No earache, sore throat or runny nose.  CARDIOVASCULAR:  No pressure, squeezing, tightness, heaviness or aching about the chest, neck, axilla or epigastrium.  RESPIRATORY:  No cough, +shortness of breath, PND or orthopnea.  GASTROINTESTINAL:  No nausea, vomiting or diarrhea.  GENITOURINARY:  No dysuria, frequency or urgency.  MUSCULOSKELETAL:  As per HPI.  SKIN:  No change in skin, hair or nails.  NEUROLOGIC:  No paresthesias, fasciculations, seizures or weakness.  PSYCHIATRIC:  No disorder of thought or mood.  ENDOCRINE:  No heat or cold intolerance, polyuria or polydipsia.  HEMATOLOGICAL:  No easy bruising or bleedings:  .     PHYSICAL EXAMINATION:    GENERAL APPEARANCE:  Pt. is not currently dyspneic, in no distress. Pt. is alert, oriented, and pleasant.  HEENT:  Pupils are normal and react normally. No icterus. Mucous membranes well colored.  NECK:  Supple. No lymphadenopathy. Jugular venous pressure not elevated. Carotids equal.   HEART:   The cardiac impulse has a normal quality. Regular. Normal S1 and S2. There are no murmurs, rubs or gallops noted  CHEST:  Chest crackles to auscultation. Normal respiratory effort.  ABDOMEN:  Soft and nontender.   EXTREMITIES:  There is no cyanosis, clubbing or edema.   SKIN:  No rash or significant lesions are noted.    LABS:                        9.8    11.52 )-----------( 346      ( 2022 22:23 )             31.5     11-14    141  |  104  |  19  ----------------------------<  183<H>  3.9   |  27  |  1.38<H>    Ca    9.3      2022 22:23    TPro  7.5  /  Alb  3.6  /  TBili  0.5  /  DBili  x   /  AST  76<H>  /  ALT  37  /  AlkPhos  93  11-14    LIVER FUNCTIONS - ( 2022 22:23 )  Alb: 3.6 g/dL / Pro: 7.5 gm/dL / ALK PHOS: 93 U/L / ALT: 37 U/L / AST: 76 U/L / GGT: x           PT/INR - ( 2022 22:23 )   PT: 15.7 sec;   INR: 1.35 ratio         PTT - ( 2022 22:23 )  PTT:31.7 sec      Urinalysis Basic - ( 2022 22:23 )    Color: Yellow / Appearance: Clear / S.015 / pH: x  Gluc: x / Ketone: Negative  / Bili: Negative / Urobili: Negative   Blood: x / Protein: Negative / Nitrite: Negative   Leuk Esterase: Trace / RBC: 0-2 /HPF / WBC 0-2   Sq Epi: x / Non Sq Epi: Moderate / Bacteria: Few      RADIOLOGY & ADDITIONAL STUDIES:     US Abdomen Upper Quadrant Right (11.15.22 @ 03:19) >  IMPRESSION:    Distended gallbladder with thickened edematous gallbladder wall and   multiple small gallstones. These findings suggest a very high positive   predictive value for acute cholecystitis. Recommend clinical and lab   correlation.    < from: CT Angio Abdomen and Pelvis w/ IV Cont (11.15.22 @ 00:19) >  LIVER: Numerous scattered tiny micronodules, most prominent within the   right middle lobe, may represent mucoid impacted distal airways.   Scattered subsegmental atelectasis.  BILE DUCTS: Normal caliber.  GALLBLADDER: Cholelithiasis. Suspicion for mild wall thickening and/or   mild pericholecystic inflammation.  SPLEEN: Within normal limits.  PANCREAS: Within normal limits.  ADRENALS: Within normal limits.  KIDNEYS/URETERS: 2.1 cm left lower pole renal lesion, indeterminant,   slightly increased in size since 2017.Additional 1.1 cm left lower pole   indeterminate renal lesion minimally increased in size. No hydronephrosis.    BLADDER: Within normal limits.  REPRODUCTIVE ORGANS: Uterus and adnexa within normal limits.    BOWEL: No bowel obstruction. Colonic diverticulosis without CT evidence   of acute diverticulitis. Appendix is not visualized. No evidence of   inflammation in the pericecal region. Duodenal diverticula.  PERITONEUM: No ascites.  VESSELS: Atherosclerotic changes. Patent celiac, superior mesenteric,   inferior mesenteric, and bilateral renal arteries.  RETROPERITONEUM/LYMPH NODES: No lymphadenopathy.  ABDOMINAL WALL: Within normal limits.  BONES: Degenerative changes.    IMPRESSION:  No CT evidence of mesenteric ischemia.    Cholelithiasis with suggestion of wall thickening and mild   pericholecystic inflammation. Acute cholecystitis is a diagnostic   consideration. Follow-up right upper quadrant sonogram and/or HIDA scan   may be obtained for further evaluation.      
Patient is a 81y old  Female who presents with a chief complaint of Acute cholecystitis (17 Nov 2022 13:40)      BRIEF HOSPITAL COURSE: 81 year old female PMHx COPD (on 6L Home O2)  , HTN, HLD, pAF, AS, PUD, Colitis, DM2, ANU ( no NIV).  Admitted 11/15/2022 with abdominal pain.  Rx'ed with Acute Choley.    Events last 24 hours: Asked to eval in PACU for Hypoxia requiring 50% VM s/p Lap Choley this afternoon.  Patient with out complaint on exam, Deneis any SOB, or pain.    PAST MEDICAL & SURGICAL HISTORY:  HTN (hypertension)  HLD (hyperlipidemia)  DM (diabetes mellitus)  Paroxysmal A-fib  on Eliquis  ANU (obstructive sleep apnea)  on CPAP  H/O gastroesophageal reflux (GERD)  History of ulcerative colitis  S/P appendectomy        Review of Systems:  CONSTITUTIONAL: No fever, chills, or fatigue  EYES: No eye pain, visual disturbances, or discharge  ENMT:  No difficulty hearing, tinnitus, vertigo; No sinus or throat pain  NECK: No pain or stiffness  RESPIRATORY: No cough, wheezing, chills or hemoptysis; No shortness of breath  CARDIOVASCULAR: No chest pain, palpitations, dizziness, or leg swelling  GASTROINTESTINAL: No abdominal or epigastric pain. No nausea, vomiting, or hematemesis; No diarrhea or constipation. No melena or hematochezia.  GENITOURINARY: No dysuria, frequency, hematuria, or incontinence  NEUROLOGICAL: No headaches, memory loss, loss of strength, numbness, or tremors  SKIN: No itching, burning, rashes, or lesions   MUSCULOSKELETAL: No joint pain or swelling; No muscle, back, or extremity pain  PSYCHIATRIC: No depression, anxiety, mood swings, or difficulty sleeping      Medications:  piperacillin/tazobactam IVPB.. 3.375 Gram(s) IV Intermittent every 8 hours  diltiazem    milliGRAM(s) Oral daily  metoprolol succinate ER 25 milliGRAM(s) Oral daily  budesonide  80 MICROgram(s)/formoterol 4.5 MICROgram(s) Inhaler 2 Puff(s) Inhalation two times a day  tiotropium 18 MICROgram(s) Capsule 1 Capsule(s) Inhalation daily  acetaminophen     Tablet .. 325 milliGRAM(s) Oral every 4 hours PRN  fentaNYL    Injectable 50 MICROGram(s) IV Push every 10 minutes PRN  gabapentin 100 milliGRAM(s) Oral at bedtime  melatonin 3 milliGRAM(s) Oral at bedtime PRN  morphine  - Injectable 4 milliGRAM(s) IV Push every 4 hours PRN  morphine  - Injectable 4 milliGRAM(s) IV Push every 4 hours PRN  ondansetron Injectable 4 milliGRAM(s) IV Push once PRN  ondansetron Injectable 4 milliGRAM(s) IV Push every 6 hours PRN  oxycodone    5 mG/acetaminophen 325 mG 1 Tablet(s) Oral every 4 hours PRN  heparin   Injectable 5000 Unit(s) SubCutaneous every 12 hours  mesalamine ER (24-Hour) Capsule 1.5 Gram(s) Oral daily  pantoprazole    Tablet 40 milliGRAM(s) Oral before breakfast  allopurinol 300 milliGRAM(s) Oral daily  dextrose 50% Injectable 25 Gram(s) IV Push once  dextrose 50% Injectable 12.5 Gram(s) IV Push once  dextrose 50% Injectable 25 Gram(s) IV Push once  dextrose Oral Gel 15 Gram(s) Oral once PRN  glucagon  Injectable 1 milliGRAM(s) IntraMuscular once  insulin lispro (ADMELOG) corrective regimen sliding scale   SubCutaneous three times a day before meals  methylPREDNISolone sodium succinate Injectable 40 milliGRAM(s) IV Push daily  simvastatin 20 milliGRAM(s) Oral at bedtime  dextrose 5%. 1000 milliLiter(s) IV Continuous <Continuous>  dextrose 5%. 1000 milliLiter(s) IV Continuous <Continuous>  sodium chloride 0.9%. 1000 milliLiter(s) IV Continuous <Continuous>  sodium chloride 0.9%. 1000 milliLiter(s) IV Continuous <Continuous>          ICU Vital Signs Last 24 Hrs  T(C): 36.8 (17 Nov 2022 18:00), Max: 36.9 (17 Nov 2022 08:14)  T(F): 98.2 (17 Nov 2022 18:00), Max: 98.4 (17 Nov 2022 08:14)  HR: 74 (17 Nov 2022 20:30) (60 - 85)  BP: 136/47 (17 Nov 2022 20:30) (114/36 - 156/67)  RR: 22 (17 Nov 2022 20:30) (17 - 23)  SpO2: 88% (17 Nov 2022 20:30) (86% - 98%)    O2 Parameters below as of 17 Nov 2022 20:30  Patient On (Oxygen Delivery Method): mask, Venturi    O2 Concentration (%): 50            I&O's Detail    17 Nov 2022 07:01  -  17 Nov 2022 21:59  --------------------------------------------------------  IN:    Other (mL): 700 mL    sodium chloride 0.9%: 825 mL  Total IN: 1525 mL    OUT:    Bulb (mL): 40 mL    IV PiggyBack: 0 mL    Oral Fluid: 0 mL    Voided (mL): 550 mL  Total OUT: 590 mL    Total NET: 935 mL            LABS:                        8.8    12.78 )-----------( 237      ( 17 Nov 2022 08:15 )             28.0     11-17    141  |  111<H>  |  14  ----------------------------<  161<H>  3.8   |  26  |  0.84    Ca    8.1<L>      17 Nov 2022 08:15    TPro  5.9<L>  /  Alb  2.5<L>  /  TBili  0.3  /  DBili  x   /  AST  127<H>  /  ALT  344<H>  /  AlkPhos  138<H>  11-17          CAPILLARY BLOOD GLUCOSE      POCT Blood Glucose.: 205 mg/dL (17 Nov 2022 19:27)        CULTURES:      Physical Examination:    General:  Awake.  Alert, oriented, interactive, nonfocal    HEENT: Pupils equal, reactive to light.  Symmetric. No JVD.    PULM: Fine scattered rhonchi anteriorly  bilaterally, No Wheeze, no rales, no significant sputum production    CVS: Regular rate and rhythm, no murmurs, rubs, or gallops    ABD: Soft, nondistended, Minimal diffusely tender, No bowel sounds, Lap Dressings C/D/I     EXT: No edema, nontender    SKIN: Warm and well perfused, no rashes noted.        RADIOLOGY:   < from: US Abdomen Upper Quadrant Right (11.15.22 @ 03:19) >    ACC: [id number] EXAM:  US ABDOMEN RT UPR QUADRANT                          PROCEDURE DATE:  11/15/2022          INTERPRETATION:  CLINICAL INFORMATION: Right upper quadrant pain.   Abnormal gallbladder on CT.    COMPARISON: None available.    TECHNIQUE: Sonography of the right upper quadrant.    FINDINGS:  Liver: Liver measures 21.5 cm, question mild increased echotexture.  Bile ducts: Normal caliber. Common bile duct measures 4 mm.  Gallbladder: Distended gallbladder. Edematous gallbladder wall measures   up to 6 mm in thickness. Multiple small gallstones. The patient was given   pain medication prior to this exam.  Pancreas: Not well seen.  Right kidney: 10.1 cm. No hydronephrosis.  Ascites: None.  IVC: Visualized portions are within normal limits.  MPV: Normal directional flow and spectral tracing.    IMPRESSION:    Distended gallbladder with thickened edematous gallbladder wall and   multiple small gallstones. These findings suggest a very high positive   predictive value for acute cholecystitis. Recommend clinical and lab   correlation.    --- End of Report ---    < end of copied text >  < from: CT Chest No Cont (11.15.22 @ 13:09) >    ACC: [id number] EXAM:  CT CHEST                          PROCEDURE DATE:  11/15/2022          INTERPRETATION:  INDICATION: Shortness of breath, COPD, hypoxemia    TECHNIQUE: Volumetric images of the chest without intravenous contrast.   Maximum intensity projection images were generated.    COMPARISON: No prior chest CT.    FINDINGS:    LUNGS/AIRWAYS/PLEURA: No endobronchial lesion. Bilateral interlobular   septal thickening. Mild bronchiolar impaction in the right lung. Right   upper lobe 6 mm nodule (2-43). Passive atelectasis in the lower lobes and   lingula. No pleural effusion.    LYMPH NODES/MEDIASTINUM: No enlarged lymph nodes. Bilateral subcentimeter   thyroid nodules.    HEART/VASCULATURE: No enlarged lymph nodes. Unremarkable pericardium.   Normal caliber aorta. Dilated pulmonary artery, larger than the ascending   aorta.    UPPER ABDOMEN: Cholelithiasis.    BONES: No acute or suspicious abnormality.      IMPRESSION:    Mild interstitial pulmonary edema.    Mild distal airway impaction in the right lung.    Right upper lobe 6 mm nodule. Six-month follow-up is recommended.    < end of copied text >      (Assessing presenting problems of acute illness, which pose high probability of life threatening deterioration or end organ damage/dysfunction, as well as medical decision making including initiating plan of care, reviewing data, reviewing radiologic exams, discussing with multidisciplinary team,  discussing goals of care with patient/family, and writing this note.  Non-inclusive of procedures performed)

## 2022-11-17 NOTE — PROVIDER CONTACT NOTE (MEDICATION) - ACTION/TREATMENT ORDERED:
Per Dr monsalve- ok to give all morning meds but hold- heparin and cardizem. Ok to give insulin as well

## 2022-11-17 NOTE — PROGRESS NOTE ADULT - NUTRITIONAL ASSESSMENT
81 year old woman presents with intractable abdominal pain.    Intractable abdominal pain due to acute cholecystitis:  -Supportive care, pain control  -IV antibiotics  -NPO, for fredrick today vs tomorrow.  Pt cleared by cardiology with out need for further diagnostic testing.      COPD / ANU / chronic respiratory failure: STABLE  -Not in acute exacerbation  -stress steroids per pulm  -f/u CT  -supplemental O2 for goal sats >92%  -Pulm Dr. Gutierrez following       HTN / HLD / paroxysmal afib:  -resume home meds, with exception of anticoagulation  -vitals stable, rate controlled       DM2:  -hold metformin  -RAISS      DVT ppx:  -home eliquis on hold

## 2022-11-17 NOTE — PROVIDER CONTACT NOTE (MEDICATION) - SITUATION
Patient going to OR today, ok to give all meds. Pts BP is 135/48, should I give both cardizem and Toprol XL or hold them.

## 2022-11-17 NOTE — CONSULT NOTE ADULT - ASSESSMENT
81 year old female PMHx COPD (on 6L Home O2)  , HTN, HLD, pAF, AS, PUD, Colitis, DM2, ANU ( no NIV).  Admitted 11/15/2022 with abdominal pain.     Acute Cholecystitis  POD 0 s/p Laparoscopic Cholecystectomy    Chronic Hypoxia / COPD         Admit to [facility]   Pain Mgt post op   Unlabored on VM 50%  Not a marked increase in baseline o2 requirements from her normal 6L (44%)  Cont Steroids / nebs   HDS - Cont Home BP meds   Rate controlled   NPO - advance diet per Sx   Watch Lytes and UOP  Lispro by SS   DVT PPX   Resume home dose Eliquis when Ok by Sx

## 2022-11-17 NOTE — PROVIDER CONTACT NOTE (MEDICATION) - BACKGROUND
patient is a 80 y/o female , admitted on 11/15 with acute cholecystitis, scheduled to go to OR around 1330 for lap fredrick.

## 2022-11-18 LAB
ADD ON TEST-SPECIMEN IN LAB: SIGNIFICANT CHANGE UP
ALBUMIN SERPL ELPH-MCNC: 2.5 G/DL — LOW (ref 3.3–5)
ALP SERPL-CCNC: 121 U/L — HIGH (ref 40–120)
ALT FLD-CCNC: 252 U/L — HIGH (ref 12–78)
ANION GAP SERPL CALC-SCNC: 3 MMOL/L — LOW (ref 5–17)
AST SERPL-CCNC: 62 U/L — HIGH (ref 15–37)
BASOPHILS # BLD AUTO: 0 K/UL — SIGNIFICANT CHANGE UP (ref 0–0.2)
BASOPHILS NFR BLD AUTO: 0 % — SIGNIFICANT CHANGE UP (ref 0–2)
BILIRUB DIRECT SERPL-MCNC: 0.1 MG/DL — SIGNIFICANT CHANGE UP (ref 0–0.3)
BILIRUB INDIRECT FLD-MCNC: 0.2 MG/DL — SIGNIFICANT CHANGE UP (ref 0.2–1)
BILIRUB SERPL-MCNC: 0.3 MG/DL — SIGNIFICANT CHANGE UP (ref 0.2–1.2)
BUN SERPL-MCNC: 15 MG/DL — SIGNIFICANT CHANGE UP (ref 7–23)
CALCIUM SERPL-MCNC: 7.6 MG/DL — LOW (ref 8.5–10.1)
CHLORIDE SERPL-SCNC: 110 MMOL/L — HIGH (ref 96–108)
CO2 SERPL-SCNC: 29 MMOL/L — SIGNIFICANT CHANGE UP (ref 22–31)
CREAT SERPL-MCNC: 0.92 MG/DL — SIGNIFICANT CHANGE UP (ref 0.5–1.3)
EGFR: 62 ML/MIN/1.73M2 — SIGNIFICANT CHANGE UP
EOSINOPHIL # BLD AUTO: 0 K/UL — SIGNIFICANT CHANGE UP (ref 0–0.5)
EOSINOPHIL NFR BLD AUTO: 0 % — SIGNIFICANT CHANGE UP (ref 0–6)
GLUCOSE SERPL-MCNC: 141 MG/DL — HIGH (ref 70–99)
HCT VFR BLD CALC: 28.5 % — LOW (ref 34.5–45)
HGB BLD-MCNC: 8.8 G/DL — LOW (ref 11.5–15.5)
IMM GRANULOCYTES NFR BLD AUTO: 1 % — HIGH (ref 0–0.9)
LYMPHOCYTES # BLD AUTO: 0.53 K/UL — LOW (ref 1–3.3)
LYMPHOCYTES # BLD AUTO: 4.7 % — LOW (ref 13–44)
MCHC RBC-ENTMCNC: 25 PG — LOW (ref 27–34)
MCHC RBC-ENTMCNC: 30.9 GM/DL — LOW (ref 32–36)
MCV RBC AUTO: 81 FL — SIGNIFICANT CHANGE UP (ref 80–100)
MONOCYTES # BLD AUTO: 0.71 K/UL — SIGNIFICANT CHANGE UP (ref 0–0.9)
MONOCYTES NFR BLD AUTO: 6.3 % — SIGNIFICANT CHANGE UP (ref 2–14)
NEUTROPHILS # BLD AUTO: 9.92 K/UL — HIGH (ref 1.8–7.4)
NEUTROPHILS NFR BLD AUTO: 88 % — HIGH (ref 43–77)
PLATELET # BLD AUTO: 287 K/UL — SIGNIFICANT CHANGE UP (ref 150–400)
POTASSIUM SERPL-MCNC: 3.9 MMOL/L — SIGNIFICANT CHANGE UP (ref 3.5–5.3)
POTASSIUM SERPL-SCNC: 3.9 MMOL/L — SIGNIFICANT CHANGE UP (ref 3.5–5.3)
PROT SERPL-MCNC: 5.9 GM/DL — LOW (ref 6–8.3)
RBC # BLD: 3.52 M/UL — LOW (ref 3.8–5.2)
RBC # FLD: 18 % — HIGH (ref 10.3–14.5)
SODIUM SERPL-SCNC: 142 MMOL/L — SIGNIFICANT CHANGE UP (ref 135–145)
WBC # BLD: 11.27 K/UL — HIGH (ref 3.8–10.5)
WBC # FLD AUTO: 11.27 K/UL — HIGH (ref 3.8–10.5)

## 2022-11-18 PROCEDURE — 99232 SBSQ HOSP IP/OBS MODERATE 35: CPT

## 2022-11-18 PROCEDURE — 71045 X-RAY EXAM CHEST 1 VIEW: CPT | Mod: 26

## 2022-11-18 RX ORDER — INSULIN LISPRO 100/ML
VIAL (ML) SUBCUTANEOUS
Refills: 0 | Status: DISCONTINUED | OUTPATIENT
Start: 2022-11-18 | End: 2022-11-22

## 2022-11-18 RX ORDER — FUROSEMIDE 40 MG
20 TABLET ORAL ONCE
Refills: 0 | Status: COMPLETED | OUTPATIENT
Start: 2022-11-18 | End: 2022-11-18

## 2022-11-18 RX ORDER — PIPERACILLIN AND TAZOBACTAM 4; .5 G/20ML; G/20ML
3.38 INJECTION, POWDER, LYOPHILIZED, FOR SOLUTION INTRAVENOUS EVERY 8 HOURS
Refills: 0 | Status: DISCONTINUED | OUTPATIENT
Start: 2022-11-18 | End: 2022-11-22

## 2022-11-18 RX ADMIN — TIOTROPIUM BROMIDE 1 CAPSULE(S): 18 CAPSULE ORAL; RESPIRATORY (INHALATION) at 09:19

## 2022-11-18 RX ADMIN — HEPARIN SODIUM 5000 UNIT(S): 5000 INJECTION INTRAVENOUS; SUBCUTANEOUS at 10:55

## 2022-11-18 RX ADMIN — PIPERACILLIN AND TAZOBACTAM 25 GRAM(S): 4; .5 INJECTION, POWDER, LYOPHILIZED, FOR SOLUTION INTRAVENOUS at 05:17

## 2022-11-18 RX ADMIN — PIPERACILLIN AND TAZOBACTAM 25 GRAM(S): 4; .5 INJECTION, POWDER, LYOPHILIZED, FOR SOLUTION INTRAVENOUS at 17:12

## 2022-11-18 RX ADMIN — Medication 240 MILLIGRAM(S): at 10:56

## 2022-11-18 RX ADMIN — BUDESONIDE AND FORMOTEROL FUMARATE DIHYDRATE 2 PUFF(S): 160; 4.5 AEROSOL RESPIRATORY (INHALATION) at 09:19

## 2022-11-18 RX ADMIN — Medication 300 MILLIGRAM(S): at 10:56

## 2022-11-18 RX ADMIN — Medication 6: at 21:28

## 2022-11-18 RX ADMIN — Medication 4: at 17:55

## 2022-11-18 RX ADMIN — BUDESONIDE AND FORMOTEROL FUMARATE DIHYDRATE 2 PUFF(S): 160; 4.5 AEROSOL RESPIRATORY (INHALATION) at 22:00

## 2022-11-18 RX ADMIN — Medication 1.5 GRAM(S): at 10:56

## 2022-11-18 RX ADMIN — HEPARIN SODIUM 5000 UNIT(S): 5000 INJECTION INTRAVENOUS; SUBCUTANEOUS at 21:28

## 2022-11-18 RX ADMIN — PANTOPRAZOLE SODIUM 40 MILLIGRAM(S): 20 TABLET, DELAYED RELEASE ORAL at 05:17

## 2022-11-18 RX ADMIN — SIMVASTATIN 20 MILLIGRAM(S): 20 TABLET, FILM COATED ORAL at 21:27

## 2022-11-18 RX ADMIN — Medication 2: at 12:33

## 2022-11-18 RX ADMIN — Medication 25 MILLIGRAM(S): at 10:56

## 2022-11-18 RX ADMIN — GABAPENTIN 100 MILLIGRAM(S): 400 CAPSULE ORAL at 21:27

## 2022-11-18 RX ADMIN — Medication 20 MILLIGRAM(S): at 14:55

## 2022-11-18 RX ADMIN — Medication 40 MILLIGRAM(S): at 10:55

## 2022-11-18 NOTE — PROGRESS NOTE ADULT - SUBJECTIVE AND OBJECTIVE BOX
Cardiology Progress Note    HPI: 82 yo F with pmh COPD on 6L NC, colitis, PUD presents to  with acute onset of RUQ abdominal pain. Pt denies any event that provoked the episode, however states it became so bad she decided to come to  for additional chris/ and evaluation. In the ER, a sonogram revealed a distended gallbladder with presence of gallstones, indicating acute cholecystitis and a surgical consultation.  pt denies any previous h/o these attacks.  She has a past surgical hx of an open appendectomy when she was 16. No other abx sx reported.  She has NKDA. (15 Nov 2022 06:23)    11/15. Chart reviewed. Pt with COPD on home O2, mild AS, HTN.  No h/o CAD/CHF/CABG. Case d/w daughter at bedside. No CP. Chronic SOB.  +Abd pain. Awaiting choly.     . S/p lap choly. No CP. Now on VM. SR on tele.   No complaints.    PAST MEDICAL & SURGICAL HISTORY:  HTN (hypertension)  HLD (hyperlipidemia)  DM (diabetes mellitus)  Paroxysmal A-fib  on Eliquis  ANU (obstructive sleep apnea)  on CPAP  H/O gastroesophageal reflux (GERD)  History of ulcerative colitis  S/P appendectomy    Allergies  aspirin (Stomach Upset)    SOCIAL HISTORY: Denies tobacco, etoh abuse or illicit drug use    FAMILY HISTORY:  FH: HTN (hypertension)  mother, father    FH: stroke  mother    FH: CHF (congestive heart failure)  father    FH: cancer  brother    MEDICATIONS  (STANDING):  allopurinol 300 milliGRAM(s) Oral daily  budesonide  80 MICROgram(s)/formoterol 4.5 MICROgram(s) Inhaler 2 Puff(s) Inhalation two times a day  dextrose 5%. 1000 milliLiter(s) (50 mL/Hr) IV Continuous <Continuous>  dextrose 5%. 1000 milliLiter(s) (100 mL/Hr) IV Continuous <Continuous>  dextrose 50% Injectable 25 Gram(s) IV Push once  dextrose 50% Injectable 12.5 Gram(s) IV Push once  dextrose 50% Injectable 25 Gram(s) IV Push once  diltiazem    milliGRAM(s) Oral daily  gabapentin 100 milliGRAM(s) Oral at bedtime  glucagon  Injectable 1 milliGRAM(s) IntraMuscular once  insulin lispro (ADMELOG) corrective regimen sliding scale   SubCutaneous three times a day before meals  mesalamine ER (24-Hour) Capsule 1.5 Gram(s) Oral daily  methylPREDNISolone sodium succinate Injectable 40 milliGRAM(s) IV Push every 8 hours  metoprolol succinate ER 25 milliGRAM(s) Oral daily  pantoprazole    Tablet 40 milliGRAM(s) Oral before breakfast  piperacillin/tazobactam IVPB.- 3.375 Gram(s) IV Intermittent once  piperacillin/tazobactam IVPB.. 3.375 Gram(s) IV Intermittent every 8 hours  simvastatin 20 milliGRAM(s) Oral at bedtime  sodium chloride 0.9%. 1000 milliLiter(s) (75 mL/Hr) IV Continuous <Continuous>  tiotropium 18 MICROgram(s) Capsule 1 Capsule(s) Inhalation daily    MEDICATIONS  (PRN):  dextrose Oral Gel 15 Gram(s) Oral once PRN Blood Glucose LESS THAN 70 milliGRAM(s)/deciliter  morphine  - Injectable 4 milliGRAM(s) IV Push every 4 hours PRN breakthrough pain  ondansetron Injectable 4 milliGRAM(s) IV Push every 6 hours PRN Nausea  oxycodone    5 mG/acetaminophen 325 mG 1 Tablet(s) Oral every 4 hours PRN Moderate Pain (4 - 6)  oxycodone    5 mG/acetaminophen 325 mG 2 Tablet(s) Oral every 6 hours PRN Severe Pain (7 - 10)      Vital Signs Last 24 Hrs  T(C): 37.1 (15 Nov 2022 06:58), Max: 37.1 (15 Nov 2022 03:00)  T(F): 98.7 (15 Nov 2022 06:58), Max: 98.7 (15 Nov 2022 03:00)  HR: 95 (15 Nov 2022 07:20) (73 - 95)  BP: 121/61 (15 Nov 2022 07:20) (121/61 - 151/57)  BP(mean): 78 (15 Nov 2022 07:20) (78 - 83)  RR: 24 (15 Nov 2022 06:58) (17 - 24)  SpO2: 94% (15 Nov 2022 06:58) (89% - 95%)    Parameters below as of 15 Nov 2022 06:58  Patient On (Oxygen Delivery Method): nasal cannula  O2 Flow (L/min): 8      REVIEW OF SYSTEMS:    CONSTITUTIONAL:  As per HPI.  HEENT:  Eyes:  No diplopia or blurred vision. ENT:  No earache, sore throat or runny nose.  CARDIOVASCULAR:  No pressure, squeezing, strangling, tightness, heaviness or aching about the chest, neck, axilla or epigastrium.  RESPIRATORY:  No cough, shortness of breath, PND or orthopnea.  GASTROINTESTINAL:  +Abd pain.  MUSCULOSKELETAL:  As per HPI.  NEUROLOGIC:  No paresthesias, fasciculations, seizures or weakness.    PHYSICAL EXAMINATION:    GENERAL APPEARANCE:  Pt. is not currently dyspneic, in no distress. Pt. is alert, oriented, and pleasant.  HEENT:  Pupils are normal and react normally. No icterus. Mucous membranes well colored.  NECK:  Supple. No lymphadenopathy. Jugular venous pressure not elevated. Carotids equal.   HEART:   The cardiac impulse has a normal quality. There are no gallops noted. 2/6 JOSE ANGEL LUSB.  CHEST:  Chest is clear to auscultation. Normal respiratory effort.  ABDOMEN:  Soft and nontender.   EXTREMITIES:  There is no edema.     LABS:                        9.8    11.52 )-----------( 346      ( 2022 22:23 )             31.5     11-14    141  |  104  |  19  ----------------------------<  183<H>  3.9   |  27  |  1.38<H>    Ca    9.3      2022 22:23    TPro  7.5  /  Alb  3.6  /  TBili  0.5  /  DBili  x   /  AST  76<H>  /  ALT  37  /  AlkPhos  93  -14    LIVER FUNCTIONS - ( 2022 22:23 )  Alb: 3.6 g/dL / Pro: 7.5 gm/dL / ALK PHOS: 93 U/L / ALT: 37 U/L / AST: 76 U/L / GGT: x           PT/INR - ( 2022 22:23 )   PT: 15.7 sec;   INR: 1.35 ratio         PTT - ( 2022 22:23 )  PTT:31.7 sec      Urinalysis Basic - ( 2022 22:23 )    Color: Yellow / Appearance: Clear / S.015 / pH: x  Gluc: x / Ketone: Negative  / Bili: Negative / Urobili: Negative   Blood: x / Protein: Negative / Nitrite: Negative   Leuk Esterase: Trace / RBC: 0-2 /HPF / WBC 0-2   Sq Epi: x / Non Sq Epi: Moderate / Bacteria: Few    EKG: < from: 12 Lead ECG (20 @ 14:27) >  Normal sinus rhythm  T wave abnormality, consider anterior ischemia  Abnormal ECG    TELEMETRY: SR    CARDIAC TESTS: < from: Cardiac Cath Lab - Adult (20 @ 15:00) >  Angiographic Findings     Cardiac Arteries and Lesion Findings    LMCA: Large caliber vessel. Normal.    LAD: Medium caliber vessel. Normal.    LCx: Large caliber vessel. Normal.    RCA: Medium caliber vessel. Normal.    Ramus: Small caliber vessel. There is a 80% stenosis noted in the ostial  portion of the vessel.    VA  LV function assessed as:Normal.    < end of copied text >      RADIOLOGY & ADDITIONAL STUDIES: r< from: CT Angio Abdomen and Pelvis w/ IV Cont (11.15.22 @ 00:19) >  IMPRESSION:  No CT evidence of mesenteric ischemia.    Cholelithiasis with suggestion of wall thickening and mild   pericholecystic inflammation. Acute cholecystitis is a diagnostic   consideration. Follow-up right upper quadrant sonogram and/or HIDA scan   may be obtained for further evaluation.      < end of copied text >  < from: US Abdomen Upper Quadrant Right (11.15.22 @ 03:19) >    Distended gallbladder with thickened edematous gallbladder wall and   multiple small gallstones. These findings suggest a very high positive   predictive value for acute cholecystitis. Recommend clinical and lab   correlation.    < end of copied text >      ASSESSMENT & PLAN:

## 2022-11-18 NOTE — PROGRESS NOTE ADULT - SUBJECTIVE AND OBJECTIVE BOX
Patient is a 81y old  Female who presents with a chief complaint of Acute cholecystitis (18 Nov 2022 08:08)    BRIEF HOSPITAL COURSE: 80 yo female with PMHx COPD on 6L NC, colitis, PUD, pAFib (on eliquis), ANU, HTN, HLD, presents to  with acute onset of RUQ abdominal pain. In the ER, a sonogram revealed a distended gallbladder with presence of gallstones, indicating acute cholecystitis and a surgical consultation.  pt denies any previous h/o these attacks.  She has a past surgical hx of an open appendectomy when she was 16. No other abx sx reported.  She has NKDA.    RUQ US revealing  Distended gallbladder with thickened edematous gallbladder wall and multiple small gallstones.     11/18 pt seen this am. pain well controlled. tolerating clears. states breathing feels okay, but has worsened with movement    PAST MEDICAL & SURGICAL HISTORY:  HTN (hypertension)  HLD (hyperlipidemia)  DM (diabetes mellitus)  Paroxysmal A-fib  on Eliquis  ANU (obstructive sleep anea)  on CPAP  H/O gastroesophageal reflux (GERD)  History of ulcerative colitis  S/P appendectomy    Medications:  piperacillin/tazbactam IVPB.. 3.375 Gram(s) IV Intermittent every 8 hours  diltiazem    milliGRAM(s) Oral daily  metoprolol succinate ER 25 milliGRAM(s) Oral daily  budesonide  80 MICROgram(s)/formoterol 4.5 MICROgram(s) Inhaler 2 Puff(s) Inhalation two times a day  tiotropium 18 MICROgram(s) Capsule 1 Capsule(s) Inhalation daily  acetaminophen     Tablet .. 325 milliGRAM(s) Oral every 4 hours PRN  gabapentin 100 milliGRAM(s) Oral at bedtime  melatonin 3 milliGRAM(s) Oral at bedtime PRN  morphine  - Injectable 4 milliGRAM(s) IV Push every 4 hours PRN  morphine  - Injectable 4 milliGRAM(s) IV Push every 4 hours PRN  ondansetron Injectable 4 milliGRAM(s) IV Push every 6 hours PRN  oxycodone    5 mG/acetaminophen 325 mG 1 Tablet(s) Oral every 4 hours PRN  heparin   Injectable 5000 Unit(s) SubCutaneous every 12 hours  mesalamine ER (24-Hour) Capsule 1.5 Gram(s) Oral daily  pantoprazole    Tablet 40 milliGRAM(s) Oral before breakfast  allopurinol 300 milliGRAM(s) Oral daily  dextrose 50% Injectable 25 Gram(s) IV Push once  dextrose 50% Injectable 12.5 Gram(s) IV Push once  dextrose 50% Injectable 25 Gram(s) IV Push once  dextrose Oral Gel 15 Gram(s) Oral once PRN  glucagon  Injectable 1 milliGRAM(s) IntraMuscular once  insulin lispro (ADMELOG) corrective regimen sliding scale   SubCutaneous three times a day before meals  methylPREDNISolone sodium succinate Injectable 40 milliGRAM(s) IV Push daily  simvastatin 20 milliGRAM(s) Oral at bedtime  dextrose 5%. 1000 milliLiter(s) IV Continuous <Continuous>  dextrose 5%. 1000 milliLiter(s) IV Continuous <Continuous>  sodium chloride 0.9%. 1000 milliLiter(s) IV Continuous <Continuous>    ICU Vital Signs Last 24 Hrs  T(C): 36.7 (18 Nov 2022 09:54), Max: 36.8 (17 Nov 2022 18:00)  T(F): 98 (18 Nov 2022 09:54), Max: 98.2 (17 Nov 2022 18:00)  HR: 73 (18 Nov 2022 13:00) (60 - 81)  BP: 132/59 (18 Nov 2022 11:00) (121/51 - 164/60)  BP(mean): 79 (18 Nov 2022 11:00) (61 - 92)  ABP: --  ABP(mean): --  RR: 23 (18 Nov 2022 13:00) (13 - 28)  SpO2: 91% (18 Nov 2022 13:00) (86% - 94%)    O2 Parameters below as of 18 Nov 2022 12:00  Patient On (Oxygen Delivery Method): nasal cannula  O2 Flow (L/min): 6      I&O's Detail    17 Nov 2022 07:01  -  18 Nov 2022 07:00  --------------------------------------------------------  IN:    IV PiggyBack: 200 mL    Other (mL): 700 mL    sodium chloride 0.9%: 975 mL    sodium chloride 0.9%: 745 mL  Total IN: 2620 mL    OUT:    Bulb (mL): 140 mL    Oral Fluid: 0 mL    Voided (mL): 1050 mL  Total OUT: 1190 mL    Total NET: 1430 mL      18 Nov 2022 07:01  -  18 Nov 2022 13:34  --------------------------------------------------------  IN:    sodium chloride 0.9%: 172 mL  Total IN: 172 mL    OUT:    Bulb (mL): 70 mL    Voided (mL): 350 mL  Total OUT: 420 mL    Total NET: -248 mL    LABS:                        8.8    11.27 )-----------( 287      ( 18 Nov 2022 05:41 )             28.5     11-18    142  |  110<H>  |  15  ----------------------------<  141<H>  3.9   |  29  |  0.92    Ca    7.6<L>      18 Nov 2022 05:41  Phos  2.5     11-18  Mg     2.2     11-18    TPro  5.9<L>  /  Alb  2.5<L>  /  TBili  0.3  /  DBili  0.1  /  AST  62<H>  /  ALT  252<H>  /  AlkPhos  121<H>  11-18    CAPILLARY BLOOD GLUCOSE  POCT Blood Glucose.: 250 mg/dL (18 Nov 2022 12:15)    CULTURES:    Physical Examination:    General: No acute distress.  in chair  HEENT: Pupils equal, reactive to light.  Symmetric.  PULM: poor air movement b/l. no wheezing or crackles  NECK: Supple, no lymphadenopathy, trachea midline  CVS: Regular rate and rhythm, + systolic murmur  ABD: Soft, nondistended, nontender, normoactive bowel sounds. + JASON sanguinous   EXT: No LE edema, calf nontender  SKIN: Warm and well perfused, no rashes noted.  NEURO: Alert, oriented x 3 interactive    DEVICES: \  + JASON    RADIOLOGY:

## 2022-11-18 NOTE — PROGRESS NOTE ADULT - NS ATTEND AMEND GEN_ALL_CORE FT
o2 sat on NC is at her baseline  Clears  Pain control  IS  q1h    D/C IVF  Transfer to reg floor with o2 sat monitoring

## 2022-11-18 NOTE — PROGRESS NOTE ADULT - SUBJECTIVE AND OBJECTIVE BOX
Subjective:    pat s/p cholecystectomy, post op desaruration required VM, now on 6LPM nc sat 89%, CXR no acute changes.    Home Medications:  allopurinol 300 mg oral tablet: 1 tab(s) orally once a day (15 Nov 2022 09:50)  Apriso 0.375 g oral capsule, extended release: 4 cap(s) orally once a day (15 Nov 2022 09:50)  Cartia  mg/24 hours oral capsule, extended release: 1 cap(s) orally once a day (15 Nov 2022 09:50)  CoQ10: orally once a day (15 Nov 2022 09:50)  dicyclomine 10 mg oral capsule: 1 cap(s) orally 3 times a day, As Needed (15 Nov 2022 09:50)  Eliquis 5 mg oral tablet: 1 tab(s) orally 2 times a day  Resume at 11pm tonight (8/19/20) (15 Nov 2022 09:50)  furosemide 20 mg oral tablet: 1 tab(s) orally once a day (15 Nov 2022 09:50)  gabapentin 100 mg oral tablet: 1 tab(s) orally once a day (at bedtime) (15 Nov 2022 09:50)  metFORMIN 500 mg oral tablet: 2 tab(s) orally once a day (in the morning) (15 Nov 2022 09:50)  metFORMIN 500 mg oral tablet: 1 tab(s) orally once (at bedtime) (15 Nov 2022 09:50)  Metoprolol Succinate ER 25 mg oral tablet, extended release: 1 tab(s) orally once a day (15 Nov 2022 09:50)  pantoprazole 40 mg oral delayed release tablet: 1 tab(s) orally once a day (15 Nov 2022 09:50)  Probiotic Formula oral capsule: 1 cap(s) orally 2 times a day (15 Nov 2022 09:50)  Prolia 60 mg/mL subcutaneous solution: subcutaneous every 6 months (15 Nov 2022 09:50)  simvastatin 20 mg oral tablet: 1 tab(s) orally once a day (at bedtime) (15 Nov 2022 09:50)  Trelegy Ellipta 100 mcg-62.5 mcg-25 mcg/inh inhalation powder: 1 puff(s) inhaled once a day (15 Nov 2022 09:50)  Vitamin D3 25 mcg (1000 intl units) oral tablet: 1 tab(s) orally once a day (15 Nov 2022 09:50)  zolpidem 5 mg oral tablet: 1 tab(s) orally once a day (at bedtime), As Needed (15 Nov 2022 09:50)    MEDICATIONS  (STANDING):  allopurinol 300 milliGRAM(s) Oral daily  budesonide  80 MICROgram(s)/formoterol 4.5 MICROgram(s) Inhaler 2 Puff(s) Inhalation two times a day  dextrose 5%. 1000 milliLiter(s) (50 mL/Hr) IV Continuous <Continuous>  dextrose 5%. 1000 milliLiter(s) (100 mL/Hr) IV Continuous <Continuous>  dextrose 50% Injectable 25 Gram(s) IV Push once  dextrose 50% Injectable 12.5 Gram(s) IV Push once  dextrose 50% Injectable 25 Gram(s) IV Push once  diltiazem    milliGRAM(s) Oral daily  gabapentin 100 milliGRAM(s) Oral at bedtime  glucagon  Injectable 1 milliGRAM(s) IntraMuscular once  heparin   Injectable 5000 Unit(s) SubCutaneous every 12 hours  insulin lispro (ADMELOG) corrective regimen sliding scale   SubCutaneous Before meals and at bedtime  mesalamine ER (24-Hour) Capsule 1.5 Gram(s) Oral daily  metoprolol succinate ER 25 milliGRAM(s) Oral daily  pantoprazole    Tablet 40 milliGRAM(s) Oral before breakfast  piperacillin/tazobactam IVPB.. 3.375 Gram(s) IV Intermittent every 8 hours  simvastatin 20 milliGRAM(s) Oral at bedtime  tiotropium 18 MICROgram(s) Capsule 1 Capsule(s) Inhalation daily    MEDICATIONS  (PRN):  acetaminophen     Tablet .. 325 milliGRAM(s) Oral every 4 hours PRN Temp greater or equal to 38C (100.4F), Mild Pain (1 - 3)  dextrose Oral Gel 15 Gram(s) Oral once PRN Blood Glucose LESS THAN 70 milliGRAM(s)/deciliter  melatonin 3 milliGRAM(s) Oral at bedtime PRN Insomnia  morphine  - Injectable 4 milliGRAM(s) IV Push every 4 hours PRN breakthrough pain  morphine  - Injectable 4 milliGRAM(s) IV Push every 4 hours PRN Severe Pain (7 - 10)  ondansetron Injectable 4 milliGRAM(s) IV Push every 6 hours PRN Nausea  oxycodone    5 mG/acetaminophen 325 mG 1 Tablet(s) Oral every 4 hours PRN Moderate Pain (4 - 6)      Allergies    aspirin (Stomach Upset)    Intolerances        Vital Signs Last 24 Hrs  T(C): 36.7 (18 Nov 2022 16:41), Max: 36.8 (17 Nov 2022 18:00)  T(F): 98.1 (18 Nov 2022 16:41), Max: 98.2 (17 Nov 2022 18:00)  HR: 69 (18 Nov 2022 16:00) (60 - 81)  BP: 149/62 (18 Nov 2022 15:00) (121/51 - 164/60)  BP(mean): 88 (18 Nov 2022 15:00) (61 - 92)  RR: 19 (18 Nov 2022 16:00) (13 - 28)  SpO2: 89% (18 Nov 2022 16:00) (84% - 93%)    Parameters below as of 18 Nov 2022 12:00  Patient On (Oxygen Delivery Method): nasal cannula  O2 Flow (L/min): 6        PHYSICAL EXAMINATION:    NECK:  Supple. No lymphadenopathy. Jugular venous pressure not elevated. Carotids equal.   HEART:   The cardiac impulse has a normal quality. Reg., Nl S1 and S2.  There are no murmurs, rubs or gallops noted  CHEST:  Chest crackles to auscultation. Normal respiratory effort.  ABDOMEN:  Soft and nontender.   EXTREMITIES:  There is no edema.       LABS:                        8.8    11.27 )-----------( 287      ( 18 Nov 2022 05:41 )             28.5     11-18    142  |  110<H>  |  15  ----------------------------<  141<H>  3.9   |  29  |  0.92    Ca    7.6<L>      18 Nov 2022 05:41  Phos  2.5     11-18  Mg     2.2     11-18    TPro  5.9<L>  /  Alb  2.5<L>  /  TBili  0.3  /  DBili  0.1  /  AST  62<H>  /  ALT  252<H>  /  AlkPhos  121<H>  11-18

## 2022-11-19 LAB
ALBUMIN SERPL ELPH-MCNC: 2.6 G/DL — LOW (ref 3.3–5)
ALP SERPL-CCNC: 122 U/L — HIGH (ref 40–120)
ALT FLD-CCNC: 189 U/L — HIGH (ref 12–78)
ANION GAP SERPL CALC-SCNC: 4 MMOL/L — LOW (ref 5–17)
AST SERPL-CCNC: 27 U/L — SIGNIFICANT CHANGE UP (ref 15–37)
BILIRUB DIRECT SERPL-MCNC: 0.1 MG/DL — SIGNIFICANT CHANGE UP (ref 0–0.3)
BILIRUB INDIRECT FLD-MCNC: 0.2 MG/DL — SIGNIFICANT CHANGE UP (ref 0.2–1)
BILIRUB SERPL-MCNC: 0.3 MG/DL — SIGNIFICANT CHANGE UP (ref 0.2–1.2)
BUN SERPL-MCNC: 14 MG/DL — SIGNIFICANT CHANGE UP (ref 7–23)
CALCIUM SERPL-MCNC: 8 MG/DL — LOW (ref 8.5–10.1)
CHLORIDE SERPL-SCNC: 107 MMOL/L — SIGNIFICANT CHANGE UP (ref 96–108)
CO2 SERPL-SCNC: 28 MMOL/L — SIGNIFICANT CHANGE UP (ref 22–31)
CREAT SERPL-MCNC: 0.78 MG/DL — SIGNIFICANT CHANGE UP (ref 0.5–1.3)
EGFR: 75 ML/MIN/1.73M2 — SIGNIFICANT CHANGE UP
GLUCOSE SERPL-MCNC: 149 MG/DL — HIGH (ref 70–99)
HCT VFR BLD CALC: 30.6 % — LOW (ref 34.5–45)
HGB BLD-MCNC: 9.7 G/DL — LOW (ref 11.5–15.5)
MAGNESIUM SERPL-MCNC: 2.1 MG/DL — SIGNIFICANT CHANGE UP (ref 1.6–2.6)
MCHC RBC-ENTMCNC: 25.2 PG — LOW (ref 27–34)
MCHC RBC-ENTMCNC: 31.7 GM/DL — LOW (ref 32–36)
MCV RBC AUTO: 79.5 FL — LOW (ref 80–100)
PHOSPHATE SERPL-MCNC: 2.1 MG/DL — LOW (ref 2.5–4.5)
PLATELET # BLD AUTO: 341 K/UL — SIGNIFICANT CHANGE UP (ref 150–400)
POTASSIUM SERPL-MCNC: 3.9 MMOL/L — SIGNIFICANT CHANGE UP (ref 3.5–5.3)
POTASSIUM SERPL-SCNC: 3.9 MMOL/L — SIGNIFICANT CHANGE UP (ref 3.5–5.3)
PROT SERPL-MCNC: 6.6 GM/DL — SIGNIFICANT CHANGE UP (ref 6–8.3)
RBC # BLD: 3.85 M/UL — SIGNIFICANT CHANGE UP (ref 3.8–5.2)
RBC # FLD: 17.9 % — HIGH (ref 10.3–14.5)
SODIUM SERPL-SCNC: 139 MMOL/L — SIGNIFICANT CHANGE UP (ref 135–145)
WBC # BLD: 9.47 K/UL — SIGNIFICANT CHANGE UP (ref 3.8–10.5)
WBC # FLD AUTO: 9.47 K/UL — SIGNIFICANT CHANGE UP (ref 3.8–10.5)

## 2022-11-19 PROCEDURE — 99233 SBSQ HOSP IP/OBS HIGH 50: CPT

## 2022-11-19 RX ORDER — INSULIN LISPRO 100/ML
3 VIAL (ML) SUBCUTANEOUS
Refills: 0 | Status: DISCONTINUED | OUTPATIENT
Start: 2022-11-19 | End: 2022-11-20

## 2022-11-19 RX ORDER — INSULIN GLARGINE 100 [IU]/ML
12 INJECTION, SOLUTION SUBCUTANEOUS AT BEDTIME
Refills: 0 | Status: DISCONTINUED | OUTPATIENT
Start: 2022-11-19 | End: 2022-11-20

## 2022-11-19 RX ADMIN — PIPERACILLIN AND TAZOBACTAM 25 GRAM(S): 4; .5 INJECTION, POWDER, LYOPHILIZED, FOR SOLUTION INTRAVENOUS at 00:03

## 2022-11-19 RX ADMIN — SIMVASTATIN 20 MILLIGRAM(S): 20 TABLET, FILM COATED ORAL at 21:39

## 2022-11-19 RX ADMIN — Medication 25 MILLIGRAM(S): at 10:22

## 2022-11-19 RX ADMIN — Medication 600 MILLIGRAM(S): at 21:39

## 2022-11-19 RX ADMIN — Medication 6: at 21:41

## 2022-11-19 RX ADMIN — BUDESONIDE AND FORMOTEROL FUMARATE DIHYDRATE 2 PUFF(S): 160; 4.5 AEROSOL RESPIRATORY (INHALATION) at 09:22

## 2022-11-19 RX ADMIN — TIOTROPIUM BROMIDE 1 CAPSULE(S): 18 CAPSULE ORAL; RESPIRATORY (INHALATION) at 09:21

## 2022-11-19 RX ADMIN — Medication 3 MILLIGRAM(S): at 21:56

## 2022-11-19 RX ADMIN — PIPERACILLIN AND TAZOBACTAM 25 GRAM(S): 4; .5 INJECTION, POWDER, LYOPHILIZED, FOR SOLUTION INTRAVENOUS at 14:54

## 2022-11-19 RX ADMIN — Medication 300 MILLIGRAM(S): at 10:24

## 2022-11-19 RX ADMIN — HEPARIN SODIUM 5000 UNIT(S): 5000 INJECTION INTRAVENOUS; SUBCUTANEOUS at 21:39

## 2022-11-19 RX ADMIN — HEPARIN SODIUM 5000 UNIT(S): 5000 INJECTION INTRAVENOUS; SUBCUTANEOUS at 10:23

## 2022-11-19 RX ADMIN — Medication 3 UNIT(S): at 17:43

## 2022-11-19 RX ADMIN — PANTOPRAZOLE SODIUM 40 MILLIGRAM(S): 20 TABLET, DELAYED RELEASE ORAL at 06:35

## 2022-11-19 RX ADMIN — INSULIN GLARGINE 12 UNIT(S): 100 INJECTION, SOLUTION SUBCUTANEOUS at 21:40

## 2022-11-19 RX ADMIN — BUDESONIDE AND FORMOTEROL FUMARATE DIHYDRATE 2 PUFF(S): 160; 4.5 AEROSOL RESPIRATORY (INHALATION) at 20:50

## 2022-11-19 RX ADMIN — Medication 40 MILLIGRAM(S): at 10:23

## 2022-11-19 RX ADMIN — PIPERACILLIN AND TAZOBACTAM 25 GRAM(S): 4; .5 INJECTION, POWDER, LYOPHILIZED, FOR SOLUTION INTRAVENOUS at 06:39

## 2022-11-19 RX ADMIN — GABAPENTIN 100 MILLIGRAM(S): 400 CAPSULE ORAL at 21:39

## 2022-11-19 RX ADMIN — Medication 240 MILLIGRAM(S): at 10:24

## 2022-11-19 RX ADMIN — Medication 6: at 17:44

## 2022-11-19 RX ADMIN — Medication 1.5 GRAM(S): at 10:22

## 2022-11-19 RX ADMIN — PIPERACILLIN AND TAZOBACTAM 25 GRAM(S): 4; .5 INJECTION, POWDER, LYOPHILIZED, FOR SOLUTION INTRAVENOUS at 21:40

## 2022-11-19 RX ADMIN — Medication 2: at 12:34

## 2022-11-19 NOTE — PROGRESS NOTE ADULT - SUBJECTIVE AND OBJECTIVE BOX
Feels well, no complaints  VSS afeb  lungs- clear  cor- RRR III/VI murmer  Abd- soft non tender. Dark bloody drainage from JASON  Ext- no edema

## 2022-11-19 NOTE — PROGRESS NOTE ADULT - SUBJECTIVE AND OBJECTIVE BOX
Subjective:    pat sitting in chair, on 6lpm nc sat 88-94%, on & off, no respiratory complaint.    Home Medications:  allopurinol 300 mg oral tablet: 1 tab(s) orally once a day (15 Nov 2022 09:50)  Apriso 0.375 g oral capsule, extended release: 4 cap(s) orally once a day (15 Nov 2022 09:50)  Cartia  mg/24 hours oral capsule, extended release: 1 cap(s) orally once a day (15 Nov 2022 09:50)  CoQ10: orally once a day (15 Nov 2022 09:50)  dicyclomine 10 mg oral capsule: 1 cap(s) orally 3 times a day, As Needed (15 Nov 2022 09:50)  Eliquis 5 mg oral tablet: 1 tab(s) orally 2 times a day  Resume at 11pm tonight (8/19/20) (15 Nov 2022 09:50)  furosemide 20 mg oral tablet: 1 tab(s) orally once a day (15 Nov 2022 09:50)  gabapentin 100 mg oral tablet: 1 tab(s) orally once a day (at bedtime) (15 Nov 2022 09:50)  metFORMIN 500 mg oral tablet: 2 tab(s) orally once a day (in the morning) (15 Nov 2022 09:50)  metFORMIN 500 mg oral tablet: 1 tab(s) orally once (at bedtime) (15 Nov 2022 09:50)  Metoprolol Succinate ER 25 mg oral tablet, extended release: 1 tab(s) orally once a day (15 Nov 2022 09:50)  pantoprazole 40 mg oral delayed release tablet: 1 tab(s) orally once a day (15 Nov 2022 09:50)  Probiotic Formula oral capsule: 1 cap(s) orally 2 times a day (15 Nov 2022 09:50)  Prolia 60 mg/mL subcutaneous solution: subcutaneous every 6 months (15 Nov 2022 09:50)  simvastatin 20 mg oral tablet: 1 tab(s) orally once a day (at bedtime) (15 Nov 2022 09:50)  Trelegy Ellipta 100 mcg-62.5 mcg-25 mcg/inh inhalation powder: 1 puff(s) inhaled once a day (15 Nov 2022 09:50)  Vitamin D3 25 mcg (1000 intl units) oral tablet: 1 tab(s) orally once a day (15 Nov 2022 09:50)  zolpidem 5 mg oral tablet: 1 tab(s) orally once a day (at bedtime), As Needed (15 Nov 2022 09:50)    MEDICATIONS  (STANDING):  allopurinol 300 milliGRAM(s) Oral daily  budesonide  80 MICROgram(s)/formoterol 4.5 MICROgram(s) Inhaler 2 Puff(s) Inhalation two times a day  dextrose 5%. 1000 milliLiter(s) (100 mL/Hr) IV Continuous <Continuous>  dextrose 5%. 1000 milliLiter(s) (50 mL/Hr) IV Continuous <Continuous>  dextrose 50% Injectable 25 Gram(s) IV Push once  dextrose 50% Injectable 12.5 Gram(s) IV Push once  dextrose 50% Injectable 25 Gram(s) IV Push once  diltiazem    milliGRAM(s) Oral daily  gabapentin 100 milliGRAM(s) Oral at bedtime  glucagon  Injectable 1 milliGRAM(s) IntraMuscular once  heparin   Injectable 5000 Unit(s) SubCutaneous every 12 hours  insulin lispro (ADMELOG) corrective regimen sliding scale   SubCutaneous Before meals and at bedtime  mesalamine ER (24-Hour) Capsule 1.5 Gram(s) Oral daily  metoprolol succinate ER 25 milliGRAM(s) Oral daily  pantoprazole    Tablet 40 milliGRAM(s) Oral before breakfast  piperacillin/tazobactam IVPB.. 3.375 Gram(s) IV Intermittent every 8 hours  predniSONE   Tablet 40 milliGRAM(s) Oral daily  simvastatin 20 milliGRAM(s) Oral at bedtime  tiotropium 18 MICROgram(s) Capsule 1 Capsule(s) Inhalation daily    MEDICATIONS  (PRN):  acetaminophen     Tablet .. 325 milliGRAM(s) Oral every 4 hours PRN Temp greater or equal to 38C (100.4F), Mild Pain (1 - 3)  dextrose Oral Gel 15 Gram(s) Oral once PRN Blood Glucose LESS THAN 70 milliGRAM(s)/deciliter  melatonin 3 milliGRAM(s) Oral at bedtime PRN Insomnia  morphine  - Injectable 4 milliGRAM(s) IV Push every 4 hours PRN breakthrough pain  morphine  - Injectable 4 milliGRAM(s) IV Push every 4 hours PRN Severe Pain (7 - 10)  ondansetron Injectable 4 milliGRAM(s) IV Push every 6 hours PRN Nausea  oxycodone    5 mG/acetaminophen 325 mG 1 Tablet(s) Oral every 4 hours PRN Moderate Pain (4 - 6)      Allergies    aspirin (Stomach Upset)    Intolerances        Vital Signs Last 24 Hrs  T(C): 36.7 (19 Nov 2022 09:31), Max: 36.7 (18 Nov 2022 16:41)  T(F): 98 (19 Nov 2022 09:31), Max: 98.1 (18 Nov 2022 16:41)  HR: 76 (19 Nov 2022 08:41) (53 - 76)  BP: 145/58 (19 Nov 2022 08:00) (125/53 - 152/69)  BP(mean): 84 (19 Nov 2022 08:00) (73 - 92)  RR: 21 (19 Nov 2022 08:00) (12 - 21)  SpO2: 89% (19 Nov 2022 08:00) (84% - 92%)    Parameters below as of 19 Nov 2022 06:00  Patient On (Oxygen Delivery Method): mask, Venturi    O2 Concentration (%): 50      PHYSICAL EXAMINATION:    NECK:  Supple. No lymphadenopathy. Jugular venous pressure not elevated. Carotids equal.   HEART:   The cardiac impulse has a normal quality. Reg., Nl S1 and S2.  There are no murmurs, rubs or gallops noted  CHEST:  Chest crackles to auscultation. Normal respiratory effort.  ABDOMEN:  Soft and nontender.   EXTREMITIES:  There is no edema.       LABS:                        9.7    9.47  )-----------( 341      ( 19 Nov 2022 05:14 )             30.6     11-19    139  |  107  |  14  ----------------------------<  149<H>  3.9   |  28  |  0.78    Ca    8.0<L>      19 Nov 2022 05:14  Phos  2.1     11-19  Mg     2.1     11-19    TPro  6.6  /  Alb  2.6<L>  /  TBili  0.3  /  DBili  0.1  /  AST  27  /  ALT  189<H>  /  AlkPhos  122<H>  11-19

## 2022-11-19 NOTE — PROGRESS NOTE ADULT - SUBJECTIVE AND OBJECTIVE BOX
Cardiology Progress Note    HPI: 80 yo F with pmh COPD on 6L NC, colitis, PUD presents to  with acute onset of RUQ abdominal pain. Pt denies any event that provoked the episode, however states it became so bad she decided to come to  for additional chris/ and evaluation. In the ER, a sonogram revealed a distended gallbladder with presence of gallstones, indicating acute cholecystitis and a surgical consultation.  pt denies any previous h/o these attacks.  She has a past surgical hx of an open appendectomy when she was 16. No other abx sx reported.  She has NKDA. (15 Nov 2022 06:23)    11/15. Chart reviewed. Pt with COPD on home O2, mild AS, HTN.  No h/o CAD/CHF/CABG. Case d/w daughter at bedside. No CP. Chronic SOB.  +Abd pain. Awaiting choly.     . S/p lap choly. No CP. Now on VM. SR on tele.   No complaints.     Examined at [facility], doi well, no complaints, passing gas    PAST MEDICAL & SURGICAL HISTORY:  HTN (hypertension)  HLD (hyperlipidemia)  DM (diabetes mellitus)  Paroxysmal A-fib  on Eliquis  ANU (obstructive sleep apnea)  on CPAP  H/O gastroesophageal reflux (GERD)  History of ulcerative colitis  S/P appendectomy    Allergies  aspirin (Stomach Upset)    SOCIAL HISTORY: Denies tobacco, etoh abuse or illicit drug use    FAMILY HISTORY:  FH: HTN (hypertension)  mother, father    FH: stroke  mother    FH: CHF (congestive heart failure)  father    FH: cancer  brother    MEDICATIONS  (STANDING):  allopurinol 300 milliGRAM(s) Oral daily  budesonide  80 MICROgram(s)/formoterol 4.5 MICROgram(s) Inhaler 2 Puff(s) Inhalation two times a day  dextrose 5%. 1000 milliLiter(s) (50 mL/Hr) IV Continuous <Continuous>  dextrose 5%. 1000 milliLiter(s) (100 mL/Hr) IV Continuous <Continuous>  dextrose 50% Injectable 25 Gram(s) IV Push once  dextrose 50% Injectable 12.5 Gram(s) IV Push once  dextrose 50% Injectable 25 Gram(s) IV Push once  diltiazem    milliGRAM(s) Oral daily  gabapentin 100 milliGRAM(s) Oral at bedtime  glucagon  Injectable 1 milliGRAM(s) IntraMuscular once  insulin lispro (ADMELOG) corrective regimen sliding scale   SubCutaneous three times a day before meals  mesalamine ER (24-Hour) Capsule 1.5 Gram(s) Oral daily  methylPREDNISolone sodium succinate Injectable 40 milliGRAM(s) IV Push every 8 hours  metoprolol succinate ER 25 milliGRAM(s) Oral daily  pantoprazole    Tablet 40 milliGRAM(s) Oral before breakfast  piperacillin/tazobactam IVPB.- 3.375 Gram(s) IV Intermittent once  piperacillin/tazobactam IVPB.. 3.375 Gram(s) IV Intermittent every 8 hours  simvastatin 20 milliGRAM(s) Oral at bedtime  sodium chloride 0.9%. 1000 milliLiter(s) (75 mL/Hr) IV Continuous <Continuous>  tiotropium 18 MICROgram(s) Capsule 1 Capsule(s) Inhalation daily    MEDICATIONS  (PRN):  dextrose Oral Gel 15 Gram(s) Oral once PRN Blood Glucose LESS THAN 70 milliGRAM(s)/deciliter  morphine  - Injectable 4 milliGRAM(s) IV Push every 4 hours PRN breakthrough pain  ondansetron Injectable 4 milliGRAM(s) IV Push every 6 hours PRN Nausea  oxycodone    5 mG/acetaminophen 325 mG 1 Tablet(s) Oral every 4 hours PRN Moderate Pain (4 - 6)  oxycodone    5 mG/acetaminophen 325 mG 2 Tablet(s) Oral every 6 hours PRN Severe Pain (7 - 10)      Vital Signs Last 24 Hrs  T(C): 37.1 (15 Nov 2022 06:58), Max: 37.1 (15 Nov 2022 03:00)  T(F): 98.7 (15 Nov 2022 06:58), Max: 98.7 (15 Nov 2022 03:00)  HR: 95 (15 Nov 2022 07:20) (73 - 95)  BP: 121/61 (15 Nov 2022 07:20) (121/61 - 151/57)  BP(mean): 78 (15 Nov 2022 07:20) (78 - 83)  RR: 24 (15 Nov 2022 06:58) (17 - 24)  SpO2: 94% (15 Nov 2022 06:58) (89% - 95%)    Parameters below as of 15 Nov 2022 06:58  Patient On (Oxygen Delivery Method): nasal cannula  O2 Flow (L/min): 8      REVIEW OF SYSTEMS:    CONSTITUTIONAL:  As per HPI.  HEENT:  Eyes:  No diplopia or blurred vision. ENT:  No earache, sore throat or runny nose.  CARDIOVASCULAR:  No pressure, squeezing, strangling, tightness, heaviness or aching about the chest, neck, axilla or epigastrium.  RESPIRATORY:  No cough, shortness of breath, PND or orthopnea.  GASTROINTESTINAL:  +Abd pain.  MUSCULOSKELETAL:  As per HPI.  NEUROLOGIC:  No paresthesias, fasciculations, seizures or weakness.    PHYSICAL EXAMINATION:    GENERAL APPEARANCE:  Pt. is not currently dyspneic, in no distress. Pt. is alert, oriented, and pleasant.  HEENT:  Pupils are normal and react normally. No icterus. Mucous membranes well colored.  NECK:  Supple. No lymphadenopathy. Jugular venous pressure not elevated. Carotids equal.   HEART:   The cardiac impulse has a normal quality. There are no gallops noted. 2/6 JOSE ANGEL LUSB.  CHEST:  Chest is clear to auscultation. Normal respiratory effort.  ABDOMEN:  Soft and nontender.   EXTREMITIES:  There is no edema.     LABS:                        9.8    11.52 )-----------( 346      ( 2022 22:23 )             31.5     11-14    141  |  104  |  19  ----------------------------<  183<H>  3.9   |  27  |  1.38<H>    Ca    9.3      2022 22:23    TPro  7.5  /  Alb  3.6  /  TBili  0.5  /  DBili  x   /  AST  76<H>  /  ALT  37  /  AlkPhos  93  11-14    LIVER FUNCTIONS - ( 2022 22:23 )  Alb: 3.6 g/dL / Pro: 7.5 gm/dL / ALK PHOS: 93 U/L / ALT: 37 U/L / AST: 76 U/L / GGT: x           PT/INR - ( 2022 22:23 )   PT: 15.7 sec;   INR: 1.35 ratio         PTT - ( 2022 22:23 )  PTT:31.7 sec      Urinalysis Basic - ( 2022 22:23 )    Color: Yellow / Appearance: Clear / S.015 / pH: x  Gluc: x / Ketone: Negative  / Bili: Negative / Urobili: Negative   Blood: x / Protein: Negative / Nitrite: Negative   Leuk Esterase: Trace / RBC: 0-2 /HPF / WBC 0-2   Sq Epi: x / Non Sq Epi: Moderate / Bacteria: Few    EKG: < from: 12 Lead ECG (20 @ 14:27) >  Normal sinus rhythm  T wave abnormality, consider anterior ischemia  Abnormal ECG    TELEMETRY: SR    CARDIAC TESTS: < from: Cardiac Cath Lab - Adult (20 @ 15:00) >  Angiographic Findings     Cardiac Arteries and Lesion Findings    LMCA: Large caliber vessel. Normal.    LAD: Medium caliber vessel. Normal.    LCx: Large caliber vessel. Normal.    RCA: Medium caliber vessel. Normal.    Ramus: Small caliber vessel. There is a 80% stenosis noted in the ostial  portion of the vessel.    VA  LV function assessed as:Normal.    < end of copied text >      RADIOLOGY & ADDITIONAL STUDIES: r< from: CT Angio Abdomen and Pelvis w/ IV Cont (11.15.22 @ 00:19) >  IMPRESSION:  No CT evidence of mesenteric ischemia.    Cholelithiasis with suggestion of wall thickening and mild   pericholecystic inflammation. Acute cholecystitis is a diagnostic   consideration. Follow-up right upper quadrant sonogram and/or HIDA scan   may be obtained for further evaluation.      < end of copied text >  < from: US Abdomen Upper Quadrant Right (11.15.22 @ 03:19) >    Distended gallbladder with thickened edematous gallbladder wall and   multiple small gallstones. These findings suggest a very high positive   predictive value for acute cholecystitis. Recommend clinical and lab   correlation.    < end of copied text >      ASSESSMENT & PLAN:

## 2022-11-19 NOTE — PROGRESS NOTE ADULT - SUBJECTIVE AND OBJECTIVE BOX
CC: Abd pain    HPI:  81 year old woman with PMHx of COPD, chronic respiratory failure (on 6L NC), colitis, PUD, mild AS, DM2, HTN, HLD, ANU, Pafib presents with abdominal pain.  Denies any history of abd pain, onset is sudden, 10 out of 10 causing her to come to ED for further evaluation.  IN ED found to have acute cholecystitis.  SHe was given IV antibiotics, surgery notified and admitted to [facility].     She underwent Lap fredrick. Post op transferred to [facility] for hypoxia.   Now at baseline o2 requirement, being transferred out of [facility] per intensivist. Hospitalist service asked to resume care.     pt seen and examined this am. Mild ruq discomfort. no sob/chest pain. hasn't been out of bed yet. Was on 50% vm overnight. agreeable to transition to nasal cannula.   +productive cough-chronic.   Tolerating po.     REVIEW OF SYSTEMS: All other review of systems is negative unless indicated above.      Vital Signs Last 24 Hrs  T(C): 36.8 (19 Nov 2022 14:22), Max: 36.8 (19 Nov 2022 14:22)  T(F): 98.3 (19 Nov 2022 14:22), Max: 98.3 (19 Nov 2022 14:22)  HR: 62 (19 Nov 2022 12:00) (53 - 78)  BP: 125/71 (19 Nov 2022 12:00) (125/53 - 152/69)  BP(mean): 79 (19 Nov 2022 12:00) (74 - 92)  RR: 13 (19 Nov 2022 12:00) (12 - 21)  SpO2: 92% (19 Nov 2022 12:00) (88% - 92%)      PHYSICAL EXAM:    GENERAL: Comfortable, no acute distress   HEAD:  Normocephalic, atraumatic  EYES: EOMI, PERRLA  HEENT: Moist mucous membranes  NECK: Supple, No JVD  NERVOUS SYSTEM:  Alert & Oriented X3, Motor Strength 5/5 B/L upper and lower extremities  CHEST/LUNG: Rales Left base  HEART: Regular rate and rhythm  ABDOMEN: Soft, appropriate post op tenderness, Nondistended, Bowel sounds present  GENITOURINARY: Voiding, no palpable bladder  EXTREMITIES:   No clubbing, cyanosis, or edema  MUSCULOSKELETAL- No muscle tenderness, no joint tenderness  SKIN-no rash    LABS:                        9.7    9.47  )-----------( 341      ( 19 Nov 2022 05:14 )             30.6     11-19    139  |  107  |  14  ----------------------------<  149<H>  3.9   |  28  |  0.78    Ca    8.0<L>      19 Nov 2022 05:14  Phos  2.1     11-19  Mg     2.1     11-19    TPro  6.6  /  Alb  2.6<L>  /  TBili  0.3  /  DBili  0.1  /  AST  27  /  ALT  189<H>  /  AlkPhos  122<H>  11-19          MEDICATIONS  (STANDING):  allopurinol 300 milliGRAM(s) Oral daily  budesonide  80 MICROgram(s)/formoterol 4.5 MICROgram(s) Inhaler 2 Puff(s) Inhalation two times a day  dextrose 5%. 1000 milliLiter(s) (50 mL/Hr) IV Continuous <Continuous>  dextrose 5%. 1000 milliLiter(s) (100 mL/Hr) IV Continuous <Continuous>  dextrose 50% Injectable 25 Gram(s) IV Push once  dextrose 50% Injectable 12.5 Gram(s) IV Push once  dextrose 50% Injectable 25 Gram(s) IV Push once  diltiazem    milliGRAM(s) Oral daily  gabapentin 100 milliGRAM(s) Oral at bedtime  glucagon  Injectable 1 milliGRAM(s) IntraMuscular once  heparin   Injectable 5000 Unit(s) SubCutaneous every 12 hours  insulin lispro (ADMELOG) corrective regimen sliding scale   SubCutaneous three times a day before meals  mesalamine ER (24-Hour) Capsule 1.5 Gram(s) Oral daily  methylPREDNISolone sodium succinate Injectable 40 milliGRAM(s) IV Push daily  metoprolol succinate ER 25 milliGRAM(s) Oral daily  pantoprazole    Tablet 40 milliGRAM(s) Oral before breakfast  piperacillin/tazobactam IVPB.. 3.375 Gram(s) IV Intermittent every 8 hours  simvastatin 20 milliGRAM(s) Oral at bedtime  sodium chloride 0.9%. 1000 milliLiter(s) (75 mL/Hr) IV Continuous <Continuous>  tiotropium 18 MICROgram(s) Capsule 1 Capsule(s) Inhalation daily    MEDICATIONS  (PRN):  dextrose Oral Gel 15 Gram(s) Oral once PRN Blood Glucose LESS THAN 70 milliGRAM(s)/deciliter  morphine  - Injectable 4 milliGRAM(s) IV Push every 4 hours PRN breakthrough pain  ondansetron Injectable 4 milliGRAM(s) IV Push every 6 hours PRN Nausea  oxycodone    5 mG/acetaminophen 325 mG 1 Tablet(s) Oral every 4 hours PRN Moderate Pain (4 - 6)  oxycodone    5 mG/acetaminophen 325 mG 2 Tablet(s) Oral every 6 hours PRN Severe Pain (7 - 10)          ASSESSMENT AND PLAN:  81f, PMH as above a/w      1. Acute cholecystitis-  -s/p lap fredrick pod#2  -tolerating po.   -on iv zosyn.   -ambulate.   -incentive spirometry.     2. Acute on chr hypoxic resp failure d/t copd exacerbation:  -on 40mg iv solumedrol daily.   -titrate down o2 to baseline requirement.   -continue advair, spiriva.     3. HTN  -continue cardizem /bb/statin.     4. Mild aortic stenosis:  -keep euvolemic.     5. DMII:  -sugars likely uncontrolled d/t steroids.   -start lantus/premeal insulin.   -continue ss.    6. DVT px:  -hep sq.     d/w patient/rn                 CC: Abd pain    HPI:  81 year old woman with PMHx of COPD, chronic respiratory failure (on 6L NC), colitis, PUD, mild AS, DM2, HTN, HLD, ANU, Pafib presents with abdominal pain.  Denies any history of abd pain, onset is sudden, 10 out of 10 causing her to come to ED for further evaluation.  IN ED found to have acute cholecystitis.  SHe was given IV antibiotics, surgery notified and admitted to [facility].     She underwent Lap fredrick. Post op transferred to [facility] for hypoxia.   Now at baseline o2 requirement, being transferred out of [facility] per intensivist. Hospitalist service asked to resume care.     pt seen and examined this am. Mild ruq discomfort. no sob/chest pain. hasn't been out of bed yet. Was on 50% vm overnight. agreeable to transition to nasal cannula.   +productive cough-chronic.   Tolerating po.     REVIEW OF SYSTEMS: All other review of systems is negative unless indicated above.      Vital Signs Last 24 Hrs  T(C): 36.8 (19 Nov 2022 14:22), Max: 36.8 (19 Nov 2022 14:22)  T(F): 98.3 (19 Nov 2022 14:22), Max: 98.3 (19 Nov 2022 14:22)  HR: 62 (19 Nov 2022 12:00) (53 - 78)  BP: 125/71 (19 Nov 2022 12:00) (125/53 - 152/69)  BP(mean): 79 (19 Nov 2022 12:00) (74 - 92)  RR: 13 (19 Nov 2022 12:00) (12 - 21)  SpO2: 92% (19 Nov 2022 12:00) (88% - 92%)      PHYSICAL EXAM:    GENERAL: Comfortable, no acute distress   HEAD:  Normocephalic, atraumatic  EYES: EOMI, PERRLA  HEENT: Moist mucous membranes  NECK: Supple, No JVD  NERVOUS SYSTEM:  Alert & Oriented X3, Motor Strength 5/5 B/L upper and lower extremities  CHEST/LUNG: Rales Left base  HEART: Regular rate and rhythm, JOSE ANGEL   ABDOMEN: Soft, appropriate post op tenderness, Nondistended, Bowel sounds present. JASON+  GENITOURINARY: Voiding, no palpable bladder  EXTREMITIES:   No clubbing, cyanosis, or edema  MUSCULOSKELETAL- No muscle tenderness, no joint tenderness  SKIN-no rash    LABS:                        9.7    9.47  )-----------( 341      ( 19 Nov 2022 05:14 )             30.6     11-19    139  |  107  |  14  ----------------------------<  149<H>  3.9   |  28  |  0.78    Ca    8.0<L>      19 Nov 2022 05:14  Phos  2.1     11-19  Mg     2.1     11-19    TPro  6.6  /  Alb  2.6<L>  /  TBili  0.3  /  DBili  0.1  /  AST  27  /  ALT  189<H>  /  AlkPhos  122<H>  11-19          MEDICATIONS  (STANDING):  allopurinol 300 milliGRAM(s) Oral daily  budesonide  80 MICROgram(s)/formoterol 4.5 MICROgram(s) Inhaler 2 Puff(s) Inhalation two times a day  dextrose 5%. 1000 milliLiter(s) (50 mL/Hr) IV Continuous <Continuous>  dextrose 5%. 1000 milliLiter(s) (100 mL/Hr) IV Continuous <Continuous>  dextrose 50% Injectable 25 Gram(s) IV Push once  dextrose 50% Injectable 12.5 Gram(s) IV Push once  dextrose 50% Injectable 25 Gram(s) IV Push once  diltiazem    milliGRAM(s) Oral daily  gabapentin 100 milliGRAM(s) Oral at bedtime  glucagon  Injectable 1 milliGRAM(s) IntraMuscular once  heparin   Injectable 5000 Unit(s) SubCutaneous every 12 hours  insulin lispro (ADMELOG) corrective regimen sliding scale   SubCutaneous three times a day before meals  mesalamine ER (24-Hour) Capsule 1.5 Gram(s) Oral daily  methylPREDNISolone sodium succinate Injectable 40 milliGRAM(s) IV Push daily  metoprolol succinate ER 25 milliGRAM(s) Oral daily  pantoprazole    Tablet 40 milliGRAM(s) Oral before breakfast  piperacillin/tazobactam IVPB.. 3.375 Gram(s) IV Intermittent every 8 hours  simvastatin 20 milliGRAM(s) Oral at bedtime  sodium chloride 0.9%. 1000 milliLiter(s) (75 mL/Hr) IV Continuous <Continuous>  tiotropium 18 MICROgram(s) Capsule 1 Capsule(s) Inhalation daily    MEDICATIONS  (PRN):  dextrose Oral Gel 15 Gram(s) Oral once PRN Blood Glucose LESS THAN 70 milliGRAM(s)/deciliter  morphine  - Injectable 4 milliGRAM(s) IV Push every 4 hours PRN breakthrough pain  ondansetron Injectable 4 milliGRAM(s) IV Push every 6 hours PRN Nausea  oxycodone    5 mG/acetaminophen 325 mG 1 Tablet(s) Oral every 4 hours PRN Moderate Pain (4 - 6)  oxycodone    5 mG/acetaminophen 325 mG 2 Tablet(s) Oral every 6 hours PRN Severe Pain (7 - 10)          ASSESSMENT AND PLAN:  81f, PMH as above a/w      1. Acute cholecystitis-  -s/p lap fredrick pod#2  -tolerating po.   -on iv zosyn.   -ambulate.   -incentive spirometry.     2. Acute on chr hypoxic resp failure d/t copd exacerbation:  -on 40mg iv solumedrol daily.   -titrate down o2 to baseline requirement.   -continue advair, spiriva.     3. HTN  -continue cardizem /bb/statin.     4. Mild aortic stenosis:  -keep euvolemic.     5. DMII:  -sugars likely uncontrolled d/t steroids.   -start lantus/premeal insulin.   -continue ss.    6. DVT px:  -hep sq.     d/w patient/rn                 CC: Abd pain    HPI:  81 year old woman with PMHx of COPD, chronic respiratory failure (on 6L NC), colitis, PUD, mild AS, DM2, HTN, HLD, ANU, Pafib presents with abdominal pain.  Denies any history of abd pain, onset is sudden, 10 out of 10 causing her to come to ED for further evaluation.  IN ED found to have acute cholecystitis.  SHe was given IV antibiotics, surgery notified and admitted to [facility].     She underwent Lap fredrick. Post op transferred to [facility] for hypoxia.   Now at baseline o2 requirement, being transferred out of [facility] per intensivist. Hospitalist service asked to resume care.     pt seen and examined this am. Mild ruq discomfort. no sob/chest pain. hasn't been out of bed yet. Was on 50% vm overnight. agreeable to transition to nasal cannula.   +productive cough-chronic.   Tolerating po.     REVIEW OF SYSTEMS: All other review of systems is negative unless indicated above.      Vital Signs Last 24 Hrs  T(C): 36.8 (19 Nov 2022 14:22), Max: 36.8 (19 Nov 2022 14:22)  T(F): 98.3 (19 Nov 2022 14:22), Max: 98.3 (19 Nov 2022 14:22)  HR: 62 (19 Nov 2022 12:00) (53 - 78)  BP: 125/71 (19 Nov 2022 12:00) (125/53 - 152/69)  BP(mean): 79 (19 Nov 2022 12:00) (74 - 92)  RR: 13 (19 Nov 2022 12:00) (12 - 21)  SpO2: 92% (19 Nov 2022 12:00) (88% - 92%)      PHYSICAL EXAM:    GENERAL: Comfortable, no acute distress   HEAD:  Normocephalic, atraumatic  EYES: EOMI, PERRLA  HEENT: Moist mucous membranes  NECK: Supple, No JVD  NERVOUS SYSTEM:  Alert & Oriented X3, Motor Strength 5/5 B/L upper and lower extremities  CHEST/LUNG: Rales Left base  HEART: Regular rate and rhythm, JOSE ANGEL   ABDOMEN: Soft, appropriate post op tenderness, Nondistended, Bowel sounds present. JASON+  GENITOURINARY: Voiding, no palpable bladder  EXTREMITIES:   No clubbing, cyanosis, or edema  MUSCULOSKELETAL- No muscle tenderness, no joint tenderness  SKIN-no rash    LABS:                        9.7    9.47  )-----------( 341      ( 19 Nov 2022 05:14 )             30.6     11-19    139  |  107  |  14  ----------------------------<  149<H>  3.9   |  28  |  0.78    Ca    8.0<L>      19 Nov 2022 05:14  Phos  2.1     11-19  Mg     2.1     11-19    TPro  6.6  /  Alb  2.6<L>  /  TBili  0.3  /  DBili  0.1  /  AST  27  /  ALT  189<H>  /  AlkPhos  122<H>  11-19          MEDICATIONS  (STANDING):  allopurinol 300 milliGRAM(s) Oral daily  budesonide  80 MICROgram(s)/formoterol 4.5 MICROgram(s) Inhaler 2 Puff(s) Inhalation two times a day  dextrose 5%. 1000 milliLiter(s) (50 mL/Hr) IV Continuous <Continuous>  dextrose 5%. 1000 milliLiter(s) (100 mL/Hr) IV Continuous <Continuous>  dextrose 50% Injectable 25 Gram(s) IV Push once  dextrose 50% Injectable 12.5 Gram(s) IV Push once  dextrose 50% Injectable 25 Gram(s) IV Push once  diltiazem    milliGRAM(s) Oral daily  gabapentin 100 milliGRAM(s) Oral at bedtime  glucagon  Injectable 1 milliGRAM(s) IntraMuscular once  heparin   Injectable 5000 Unit(s) SubCutaneous every 12 hours  insulin lispro (ADMELOG) corrective regimen sliding scale   SubCutaneous three times a day before meals  mesalamine ER (24-Hour) Capsule 1.5 Gram(s) Oral daily  methylPREDNISolone sodium succinate Injectable 40 milliGRAM(s) IV Push daily  metoprolol succinate ER 25 milliGRAM(s) Oral daily  pantoprazole    Tablet 40 milliGRAM(s) Oral before breakfast  piperacillin/tazobactam IVPB.. 3.375 Gram(s) IV Intermittent every 8 hours  simvastatin 20 milliGRAM(s) Oral at bedtime  sodium chloride 0.9%. 1000 milliLiter(s) (75 mL/Hr) IV Continuous <Continuous>  tiotropium 18 MICROgram(s) Capsule 1 Capsule(s) Inhalation daily    MEDICATIONS  (PRN):  dextrose Oral Gel 15 Gram(s) Oral once PRN Blood Glucose LESS THAN 70 milliGRAM(s)/deciliter  morphine  - Injectable 4 milliGRAM(s) IV Push every 4 hours PRN breakthrough pain  ondansetron Injectable 4 milliGRAM(s) IV Push every 6 hours PRN Nausea  oxycodone    5 mG/acetaminophen 325 mG 1 Tablet(s) Oral every 4 hours PRN Moderate Pain (4 - 6)  oxycodone    5 mG/acetaminophen 325 mG 2 Tablet(s) Oral every 6 hours PRN Severe Pain (7 - 10)          ASSESSMENT AND PLAN:  81f, PMH as above a/w      1. Acute cholecystitis-  -s/p lap fredrick pod#2  -tolerating po.   -on iv zosyn.   -ambulate.   -incentive spirometry.     2. Acute on chr hypoxic resp failure d/t copd exacerbation:  -CT chest with distal airway impaction.  -start mucinex.  -chest pt if able.   -on 40mg iv solumedrol daily.   -titrate down o2 to baseline requirement.   -continue advair, spiriva.     3. HTN  -continue cardizem /bb/statin.     4. Mild aortic stenosis:  -keep euvolemic.     5. DMII:  -sugars likely uncontrolled d/t steroids.   -start lantus/premeal insulin.   -continue ss.    6. DVT px:  -hep sq.     d/w patient/rn

## 2022-11-20 PROCEDURE — 99233 SBSQ HOSP IP/OBS HIGH 50: CPT

## 2022-11-20 RX ORDER — FUROSEMIDE 40 MG
20 TABLET ORAL ONCE
Refills: 0 | Status: COMPLETED | OUTPATIENT
Start: 2022-11-20 | End: 2022-11-20

## 2022-11-20 RX ORDER — APIXABAN 2.5 MG/1
5 TABLET, FILM COATED ORAL
Refills: 0 | Status: DISCONTINUED | OUTPATIENT
Start: 2022-11-20 | End: 2022-11-22

## 2022-11-20 RX ORDER — INSULIN LISPRO 100/ML
5 VIAL (ML) SUBCUTANEOUS
Refills: 0 | Status: DISCONTINUED | OUTPATIENT
Start: 2022-11-20 | End: 2022-11-21

## 2022-11-20 RX ORDER — INSULIN GLARGINE 100 [IU]/ML
15 INJECTION, SOLUTION SUBCUTANEOUS AT BEDTIME
Refills: 0 | Status: DISCONTINUED | OUTPATIENT
Start: 2022-11-20 | End: 2022-11-22

## 2022-11-20 RX ADMIN — PIPERACILLIN AND TAZOBACTAM 25 GRAM(S): 4; .5 INJECTION, POWDER, LYOPHILIZED, FOR SOLUTION INTRAVENOUS at 05:38

## 2022-11-20 RX ADMIN — Medication 600 MILLIGRAM(S): at 09:47

## 2022-11-20 RX ADMIN — BUDESONIDE AND FORMOTEROL FUMARATE DIHYDRATE 2 PUFF(S): 160; 4.5 AEROSOL RESPIRATORY (INHALATION) at 21:29

## 2022-11-20 RX ADMIN — INSULIN GLARGINE 15 UNIT(S): 100 INJECTION, SOLUTION SUBCUTANEOUS at 21:31

## 2022-11-20 RX ADMIN — TIOTROPIUM BROMIDE 1 CAPSULE(S): 18 CAPSULE ORAL; RESPIRATORY (INHALATION) at 09:32

## 2022-11-20 RX ADMIN — PIPERACILLIN AND TAZOBACTAM 25 GRAM(S): 4; .5 INJECTION, POWDER, LYOPHILIZED, FOR SOLUTION INTRAVENOUS at 13:28

## 2022-11-20 RX ADMIN — HEPARIN SODIUM 5000 UNIT(S): 5000 INJECTION INTRAVENOUS; SUBCUTANEOUS at 09:46

## 2022-11-20 RX ADMIN — MORPHINE SULFATE 4 MILLIGRAM(S): 50 CAPSULE, EXTENDED RELEASE ORAL at 21:35

## 2022-11-20 RX ADMIN — Medication 25 MILLIGRAM(S): at 09:46

## 2022-11-20 RX ADMIN — Medication 300 MILLIGRAM(S): at 09:46

## 2022-11-20 RX ADMIN — Medication 3 MILLIGRAM(S): at 21:30

## 2022-11-20 RX ADMIN — Medication 4: at 17:37

## 2022-11-20 RX ADMIN — APIXABAN 5 MILLIGRAM(S): 2.5 TABLET, FILM COATED ORAL at 21:33

## 2022-11-20 RX ADMIN — Medication 40 MILLIGRAM(S): at 09:46

## 2022-11-20 RX ADMIN — MORPHINE SULFATE 4 MILLIGRAM(S): 50 CAPSULE, EXTENDED RELEASE ORAL at 22:00

## 2022-11-20 RX ADMIN — GABAPENTIN 100 MILLIGRAM(S): 400 CAPSULE ORAL at 21:30

## 2022-11-20 RX ADMIN — PANTOPRAZOLE SODIUM 40 MILLIGRAM(S): 20 TABLET, DELAYED RELEASE ORAL at 08:34

## 2022-11-20 RX ADMIN — Medication 6: at 21:32

## 2022-11-20 RX ADMIN — Medication 1.5 GRAM(S): at 09:47

## 2022-11-20 RX ADMIN — Medication 20 MILLIGRAM(S): at 17:38

## 2022-11-20 RX ADMIN — Medication 5 UNIT(S): at 17:38

## 2022-11-20 RX ADMIN — Medication 3 UNIT(S): at 12:16

## 2022-11-20 RX ADMIN — SIMVASTATIN 20 MILLIGRAM(S): 20 TABLET, FILM COATED ORAL at 21:29

## 2022-11-20 RX ADMIN — Medication 4: at 12:15

## 2022-11-20 RX ADMIN — BUDESONIDE AND FORMOTEROL FUMARATE DIHYDRATE 2 PUFF(S): 160; 4.5 AEROSOL RESPIRATORY (INHALATION) at 09:32

## 2022-11-20 RX ADMIN — Medication 240 MILLIGRAM(S): at 09:47

## 2022-11-20 RX ADMIN — PIPERACILLIN AND TAZOBACTAM 25 GRAM(S): 4; .5 INJECTION, POWDER, LYOPHILIZED, FOR SOLUTION INTRAVENOUS at 21:31

## 2022-11-20 RX ADMIN — Medication 600 MILLIGRAM(S): at 21:34

## 2022-11-20 NOTE — PHYSICAL THERAPY INITIAL EVALUATION ADULT - PERTINENT HX OF CURRENT PROBLEM, REHAB EVAL
81 year old woman with PMHx of COPD, chronic respiratory failure (on 6L NC), colitis, PUD, mild AS, DM2, HTN, HLD, ANU, Pafib presents with abdominal pain.  Denies any history of abd pain, onset is sudden, 10 out of 10 causing her to come to ED for further evaluation.  IN ED found to have acute cholecystitis.  SHe was given IV antibiotics, surgery notified and admitted to [facility]. She underwent Lap fredrick. Post op transferred to [facility] for hypoxia.  Now at baseline o2 requirement, being transferred out of [facility] per intensivist.

## 2022-11-20 NOTE — PROGRESS NOTE ADULT - SUBJECTIVE AND OBJECTIVE BOX
Subjective:    pat better, sitting in chair, on 6lpm nc sat 94%, taking po.    Home Medications:  allopurinol 300 mg oral tablet: 1 tab(s) orally once a day (15 Nov 2022 09:50)  Apriso 0.375 g oral capsule, extended release: 4 cap(s) orally once a day (15 Nov 2022 09:50)  Cartia  mg/24 hours oral capsule, extended release: 1 cap(s) orally once a day (15 Nov 2022 09:50)  CoQ10: orally once a day (15 Nov 2022 09:50)  dicyclomine 10 mg oral capsule: 1 cap(s) orally 3 times a day, As Needed (15 Nov 2022 09:50)  Eliquis 5 mg oral tablet: 1 tab(s) orally 2 times a day  Resume at 11pm tonight (8/19/20) (15 Nov 2022 09:50)  furosemide 20 mg oral tablet: 1 tab(s) orally once a day (15 Nov 2022 09:50)  gabapentin 100 mg oral tablet: 1 tab(s) orally once a day (at bedtime) (15 Nov 2022 09:50)  metFORMIN 500 mg oral tablet: 2 tab(s) orally once a day (in the morning) (15 Nov 2022 09:50)  metFORMIN 500 mg oral tablet: 1 tab(s) orally once (at bedtime) (15 Nov 2022 09:50)  Metoprolol Succinate ER 25 mg oral tablet, extended release: 1 tab(s) orally once a day (15 Nov 2022 09:50)  pantoprazole 40 mg oral delayed release tablet: 1 tab(s) orally once a day (15 Nov 2022 09:50)  Probiotic Formula oral capsule: 1 cap(s) orally 2 times a day (15 Nov 2022 09:50)  Prolia 60 mg/mL subcutaneous solution: subcutaneous every 6 months (15 Nov 2022 09:50)  simvastatin 20 mg oral tablet: 1 tab(s) orally once a day (at bedtime) (15 Nov 2022 09:50)  Trelegy Ellipta 100 mcg-62.5 mcg-25 mcg/inh inhalation powder: 1 puff(s) inhaled once a day (15 Nov 2022 09:50)  Vitamin D3 25 mcg (1000 intl units) oral tablet: 1 tab(s) orally once a day (15 Nov 2022 09:50)  zolpidem 5 mg oral tablet: 1 tab(s) orally once a day (at bedtime), As Needed (15 Nov 2022 09:50)    MEDICATIONS  (STANDING):  allopurinol 300 milliGRAM(s) Oral daily  budesonide  80 MICROgram(s)/formoterol 4.5 MICROgram(s) Inhaler 2 Puff(s) Inhalation two times a day  dextrose 5%. 1000 milliLiter(s) (50 mL/Hr) IV Continuous <Continuous>  dextrose 5%. 1000 milliLiter(s) (100 mL/Hr) IV Continuous <Continuous>  dextrose 50% Injectable 25 Gram(s) IV Push once  dextrose 50% Injectable 12.5 Gram(s) IV Push once  dextrose 50% Injectable 25 Gram(s) IV Push once  diltiazem    milliGRAM(s) Oral daily  gabapentin 100 milliGRAM(s) Oral at bedtime  glucagon  Injectable 1 milliGRAM(s) IntraMuscular once  guaiFENesin  milliGRAM(s) Oral every 12 hours  heparin   Injectable 5000 Unit(s) SubCutaneous every 12 hours  insulin glargine Injectable (LANTUS) 12 Unit(s) SubCutaneous at bedtime  insulin lispro (ADMELOG) corrective regimen sliding scale   SubCutaneous Before meals and at bedtime  insulin lispro Injectable (ADMELOG) 3 Unit(s) SubCutaneous three times a day before meals  mesalamine ER (24-Hour) Capsule 1.5 Gram(s) Oral daily  metoprolol succinate ER 25 milliGRAM(s) Oral daily  pantoprazole    Tablet 40 milliGRAM(s) Oral before breakfast  piperacillin/tazobactam IVPB.. 3.375 Gram(s) IV Intermittent every 8 hours  predniSONE   Tablet 40 milliGRAM(s) Oral daily  simvastatin 20 milliGRAM(s) Oral at bedtime  tiotropium 18 MICROgram(s) Capsule 1 Capsule(s) Inhalation daily    MEDICATIONS  (PRN):  acetaminophen     Tablet .. 325 milliGRAM(s) Oral every 4 hours PRN Temp greater or equal to 38C (100.4F), Mild Pain (1 - 3)  dextrose Oral Gel 15 Gram(s) Oral once PRN Blood Glucose LESS THAN 70 milliGRAM(s)/deciliter  melatonin 3 milliGRAM(s) Oral at bedtime PRN Insomnia  morphine  - Injectable 4 milliGRAM(s) IV Push every 4 hours PRN breakthrough pain  morphine  - Injectable 4 milliGRAM(s) IV Push every 4 hours PRN Severe Pain (7 - 10)  ondansetron Injectable 4 milliGRAM(s) IV Push every 6 hours PRN Nausea  oxycodone    5 mG/acetaminophen 325 mG 1 Tablet(s) Oral every 4 hours PRN Moderate Pain (4 - 6)      Allergies    aspirin (Stomach Upset)    Intolerances        Vital Signs Last 24 Hrs  T(C): 36.8 (20 Nov 2022 06:00), Max: 36.8 (19 Nov 2022 14:22)  T(F): 98.3 (20 Nov 2022 06:00), Max: 98.3 (19 Nov 2022 14:22)  HR: 68 (20 Nov 2022 10:00) (52 - 68)  BP: 133/46 (20 Nov 2022 10:00) (125/71 - 162/64)  BP(mean): 73 (20 Nov 2022 10:00) (73 - 99)  RR: 25 (20 Nov 2022 10:00) (12 - 26)  SpO2: 91% (20 Nov 2022 10:00) (87% - 96%)    Parameters below as of 20 Nov 2022 02:00  Patient On (Oxygen Delivery Method): nasal cannula  O2 Flow (L/min): 6        PHYSICAL EXAMINATION:    NECK:  Supple. No lymphadenopathy. Jugular venous pressure not elevated. Carotids equal.   HEART:   The cardiac impulse has a normal quality. Reg., Nl S1 and S2.  There are no murmurs, rubs or gallops noted  CHEST:  Chest crackles to auscultation. Normal respiratory effort.  ABDOMEN:  Soft and nontender.   EXTREMITIES:  There is no edema.       LABS:                        9.7    9.47  )-----------( 341      ( 19 Nov 2022 05:14 )             30.6     11-19    139  |  107  |  14  ----------------------------<  149<H>  3.9   |  28  |  0.78    Ca    8.0<L>      19 Nov 2022 05:14  Phos  2.1     11-19  Mg     2.1     11-19    TPro  6.6  /  Alb  2.6<L>  /  TBili  0.3  /  DBili  0.1  /  AST  27  /  ALT  189<H>  /  AlkPhos  122<H>  11-19

## 2022-11-20 NOTE — PROGRESS NOTE ADULT - SUBJECTIVE AND OBJECTIVE BOX
CC: Abd pain    HPI:  81 year old woman with PMHx of COPD, chronic respiratory failure (on 6L NC), colitis, PUD, mild AS, DM2, HTN, HLD, ANU, Pafib presents with abdominal pain.  Denies any history of abd pain, onset is sudden, 10 out of 10 causing her to come to ED for further evaluation.  IN ED found to have acute cholecystitis.  SHe was given IV antibiotics, surgery notified and admitted to [facility].     She underwent Lap fredrick. Post op transferred to [facility] for hypoxia.   Now at baseline o2 requirement, being transferred out of [facility] per intensivist. Hospitalist service asked to resume care.     pt seen and examined this am. Sitting up in chair. Felt well. ambulated nursing home down green today. Browns tired afterwards. No sob/chest pain at rest.   +DAMON.   Tolerating po.    REVIEW OF SYSTEMS: All other review of systems is negative unless indicated above.      Vital Signs Last 24 Hrs  T(C): 36.5 (20 Nov 2022 14:26), Max: 36.8 (20 Nov 2022 06:00)  T(F): 97.7 (20 Nov 2022 14:26), Max: 98.3 (20 Nov 2022 06:00)  HR: 60 (20 Nov 2022 14:00) (52 - 76)  BP: 134/54 (20 Nov 2022 14:00) (125/91 - 162/64)  BP(mean): 76 (20 Nov 2022 14:00) (73 - 99)  RR: 25 (20 Nov 2022 14:00) (12 - 26)  SpO2: 95% (20 Nov 2022 14:00) (87% - 96%)    Parameters below as of 20 Nov 2022 14:00  Patient On (Oxygen Delivery Method): nasal cannula  O2 Flow (L/min): 6        PHYSICAL EXAM:    GENERAL: Comfortable, no acute distress   HEAD:  Normocephalic, atraumatic  EYES: EOMI, PERRLA  HEENT: Moist mucous membranes  NECK: Supple, No JVD  NERVOUS SYSTEM:  Alert & Oriented X3, Motor Strength 5/5 B/L upper and lower extremities  CHEST/LUNG: Rales at bases bilaterally.  HEART: Regular rate and rhythm, JOSE ANGEL   ABDOMEN: Soft, appropriate post op tenderness, Nondistended, Bowel sounds present.   GENITOURINARY: Voiding, no palpable bladder  EXTREMITIES:   No clubbing, cyanosis, or edema  MUSCULOSKELETAL- No muscle tenderness, no joint tenderness  SKIN-no rash    LABS:                        9.7    9.47  )-----------( 341      ( 19 Nov 2022 05:14 )             30.6     11-19    139  |  107  |  14  ----------------------------<  149<H>  3.9   |  28  |  0.78    Ca    8.0<L>      19 Nov 2022 05:14  Phos  2.1     11-19  Mg     2.1     11-19    TPro  6.6  /  Alb  2.6<L>  /  TBili  0.3  /  DBili  0.1  /  AST  27  /  ALT  189<H>  /  AlkPhos  122<H>  11-19                MEDICATIONS  (STANDING):  allopurinol 300 milliGRAM(s) Oral daily  budesonide  80 MICROgram(s)/formoterol 4.5 MICROgram(s) Inhaler 2 Puff(s) Inhalation two times a day  dextrose 5%. 1000 milliLiter(s) (50 mL/Hr) IV Continuous <Continuous>  dextrose 5%. 1000 milliLiter(s) (100 mL/Hr) IV Continuous <Continuous>  dextrose 50% Injectable 25 Gram(s) IV Push once  dextrose 50% Injectable 12.5 Gram(s) IV Push once  dextrose 50% Injectable 25 Gram(s) IV Push once  diltiazem    milliGRAM(s) Oral daily  gabapentin 100 milliGRAM(s) Oral at bedtime  glucagon  Injectable 1 milliGRAM(s) IntraMuscular once  heparin   Injectable 5000 Unit(s) SubCutaneous every 12 hours  insulin lispro (ADMELOG) corrective regimen sliding scale   SubCutaneous three times a day before meals  mesalamine ER (24-Hour) Capsule 1.5 Gram(s) Oral daily  methylPREDNISolone sodium succinate Injectable 40 milliGRAM(s) IV Push daily  metoprolol succinate ER 25 milliGRAM(s) Oral daily  pantoprazole    Tablet 40 milliGRAM(s) Oral before breakfast  piperacillin/tazobactam IVPB.. 3.375 Gram(s) IV Intermittent every 8 hours  simvastatin 20 milliGRAM(s) Oral at bedtime  sodium chloride 0.9%. 1000 milliLiter(s) (75 mL/Hr) IV Continuous <Continuous>  tiotropium 18 MICROgram(s) Capsule 1 Capsule(s) Inhalation daily    MEDICATIONS  (PRN):  dextrose Oral Gel 15 Gram(s) Oral once PRN Blood Glucose LESS THAN 70 milliGRAM(s)/deciliter  morphine  - Injectable 4 milliGRAM(s) IV Push every 4 hours PRN breakthrough pain  ondansetron Injectable 4 milliGRAM(s) IV Push every 6 hours PRN Nausea  oxycodone    5 mG/acetaminophen 325 mG 1 Tablet(s) Oral every 4 hours PRN Moderate Pain (4 - 6)  oxycodone    5 mG/acetaminophen 325 mG 2 Tablet(s) Oral every 6 hours PRN Severe Pain (7 - 10)          ASSESSMENT AND PLAN:  81f, PMH as above a/w      1. Acute cholecystitis-  -s/p lap fredrick pod#3  -tolerating po.   -on iv zosyn.   -ambulate.   -incentive spirometry.     2. Acute on chr hypoxic resp failure d/t copd exacerbation:  -CT chest with distal airway impaction.  -mucinex  -chest pt if able.   -s/p iv solumedrol, now on prednisone per pulm  -on baseline o2 via nasal cannula (6L)  -continue advair, spiriva.     3. HTN  -continue cardizem /bb/statin.     4. Mild aortic stenosis with mild pulmonary edema:  -iv lasix X 1    5. DMII:  -sugars likely uncontrolled d/t steroids.   -started lantus/premeal insulin.   -increase premeal insulin to 5, lantus to 15  -continue ss.    6. DVT px:  -hep sq.

## 2022-11-20 NOTE — PROGRESS NOTE ADULT - SUBJECTIVE AND OBJECTIVE BOX
Feels well, no complaints  VSS afeb  lungs- fine rales left base greater than R  Cor- RRR  Abd- + BS soft non tender  Ext- no edema

## 2022-11-20 NOTE — PHYSICAL THERAPY INITIAL EVALUATION ADULT - GENERAL OBSERVATIONS, REHAB EVAL
Pt rec'd sitting up in bedside chair on 6L O2 via nc, pleasant and cooperative with PT, eager to ambulate, no complaints of pain.

## 2022-11-21 ENCOUNTER — TRANSCRIPTION ENCOUNTER (OUTPATIENT)
Age: 82
End: 2022-11-21

## 2022-11-21 LAB
ALBUMIN SERPL ELPH-MCNC: 2.7 G/DL — LOW (ref 3.3–5)
ALP SERPL-CCNC: 104 U/L — SIGNIFICANT CHANGE UP (ref 40–120)
ALT FLD-CCNC: 93 U/L — HIGH (ref 12–78)
ANION GAP SERPL CALC-SCNC: 4 MMOL/L — LOW (ref 5–17)
AST SERPL-CCNC: 15 U/L — SIGNIFICANT CHANGE UP (ref 15–37)
BASOPHILS # BLD AUTO: 0.02 K/UL — SIGNIFICANT CHANGE UP (ref 0–0.2)
BASOPHILS NFR BLD AUTO: 0.2 % — SIGNIFICANT CHANGE UP (ref 0–2)
BILIRUB SERPL-MCNC: 0.4 MG/DL — SIGNIFICANT CHANGE UP (ref 0.2–1.2)
BUN SERPL-MCNC: 17 MG/DL — SIGNIFICANT CHANGE UP (ref 7–23)
CALCIUM SERPL-MCNC: 8.1 MG/DL — LOW (ref 8.5–10.1)
CHLORIDE SERPL-SCNC: 106 MMOL/L — SIGNIFICANT CHANGE UP (ref 96–108)
CO2 SERPL-SCNC: 31 MMOL/L — SIGNIFICANT CHANGE UP (ref 22–31)
CREAT SERPL-MCNC: 0.85 MG/DL — SIGNIFICANT CHANGE UP (ref 0.5–1.3)
EGFR: 68 ML/MIN/1.73M2 — SIGNIFICANT CHANGE UP
EOSINOPHIL # BLD AUTO: 0 K/UL — SIGNIFICANT CHANGE UP (ref 0–0.5)
EOSINOPHIL NFR BLD AUTO: 0 % — SIGNIFICANT CHANGE UP (ref 0–6)
GLUCOSE SERPL-MCNC: 142 MG/DL — HIGH (ref 70–99)
HCT VFR BLD CALC: 32.3 % — LOW (ref 34.5–45)
HGB BLD-MCNC: 10.2 G/DL — LOW (ref 11.5–15.5)
IMM GRANULOCYTES NFR BLD AUTO: 1.5 % — HIGH (ref 0–0.9)
LYMPHOCYTES # BLD AUTO: 1.24 K/UL — SIGNIFICANT CHANGE UP (ref 1–3.3)
LYMPHOCYTES # BLD AUTO: 12 % — LOW (ref 13–44)
MAGNESIUM SERPL-MCNC: 2 MG/DL — SIGNIFICANT CHANGE UP (ref 1.6–2.6)
MCHC RBC-ENTMCNC: 24.8 PG — LOW (ref 27–34)
MCHC RBC-ENTMCNC: 31.6 GM/DL — LOW (ref 32–36)
MCV RBC AUTO: 78.4 FL — LOW (ref 80–100)
MONOCYTES # BLD AUTO: 0.64 K/UL — SIGNIFICANT CHANGE UP (ref 0–0.9)
MONOCYTES NFR BLD AUTO: 6.2 % — SIGNIFICANT CHANGE UP (ref 2–14)
NEUTROPHILS # BLD AUTO: 8.25 K/UL — HIGH (ref 1.8–7.4)
NEUTROPHILS NFR BLD AUTO: 80.1 % — HIGH (ref 43–77)
PHOSPHATE SERPL-MCNC: 2 MG/DL — LOW (ref 2.5–4.5)
PLATELET # BLD AUTO: 350 K/UL — SIGNIFICANT CHANGE UP (ref 150–400)
POTASSIUM SERPL-MCNC: 3.6 MMOL/L — SIGNIFICANT CHANGE UP (ref 3.5–5.3)
POTASSIUM SERPL-SCNC: 3.6 MMOL/L — SIGNIFICANT CHANGE UP (ref 3.5–5.3)
PROT SERPL-MCNC: 6.3 GM/DL — SIGNIFICANT CHANGE UP (ref 6–8.3)
RBC # BLD: 4.12 M/UL — SIGNIFICANT CHANGE UP (ref 3.8–5.2)
RBC # FLD: 18.1 % — HIGH (ref 10.3–14.5)
SODIUM SERPL-SCNC: 141 MMOL/L — SIGNIFICANT CHANGE UP (ref 135–145)
WBC # BLD: 10.3 K/UL — SIGNIFICANT CHANGE UP (ref 3.8–10.5)
WBC # FLD AUTO: 10.3 K/UL — SIGNIFICANT CHANGE UP (ref 3.8–10.5)

## 2022-11-21 PROCEDURE — 99232 SBSQ HOSP IP/OBS MODERATE 35: CPT

## 2022-11-21 RX ORDER — SODIUM,POTASSIUM PHOSPHATES 278-250MG
2 POWDER IN PACKET (EA) ORAL ONCE
Refills: 0 | Status: COMPLETED | OUTPATIENT
Start: 2022-11-21 | End: 2022-11-21

## 2022-11-21 RX ORDER — ZOLPIDEM TARTRATE 10 MG/1
5 TABLET ORAL AT BEDTIME
Refills: 0 | Status: DISCONTINUED | OUTPATIENT
Start: 2022-11-21 | End: 2022-11-22

## 2022-11-21 RX ORDER — INSULIN LISPRO 100/ML
7 VIAL (ML) SUBCUTANEOUS
Refills: 0 | Status: DISCONTINUED | OUTPATIENT
Start: 2022-11-21 | End: 2022-11-22

## 2022-11-21 RX ORDER — FUROSEMIDE 40 MG
20 TABLET ORAL DAILY
Refills: 0 | Status: DISCONTINUED | OUTPATIENT
Start: 2022-11-21 | End: 2022-11-22

## 2022-11-21 RX ADMIN — Medication 240 MILLIGRAM(S): at 11:38

## 2022-11-21 RX ADMIN — PIPERACILLIN AND TAZOBACTAM 25 GRAM(S): 4; .5 INJECTION, POWDER, LYOPHILIZED, FOR SOLUTION INTRAVENOUS at 21:45

## 2022-11-21 RX ADMIN — Medication 20 MILLIGRAM(S): at 09:59

## 2022-11-21 RX ADMIN — Medication 600 MILLIGRAM(S): at 09:59

## 2022-11-21 RX ADMIN — PIPERACILLIN AND TAZOBACTAM 25 GRAM(S): 4; .5 INJECTION, POWDER, LYOPHILIZED, FOR SOLUTION INTRAVENOUS at 13:41

## 2022-11-21 RX ADMIN — Medication 600 MILLIGRAM(S): at 21:24

## 2022-11-21 RX ADMIN — Medication 40 MILLIGRAM(S): at 10:00

## 2022-11-21 RX ADMIN — INSULIN GLARGINE 15 UNIT(S): 100 INJECTION, SOLUTION SUBCUTANEOUS at 21:27

## 2022-11-21 RX ADMIN — Medication 1.5 GRAM(S): at 09:58

## 2022-11-21 RX ADMIN — Medication 25 MILLIGRAM(S): at 09:59

## 2022-11-21 RX ADMIN — APIXABAN 5 MILLIGRAM(S): 2.5 TABLET, FILM COATED ORAL at 21:23

## 2022-11-21 RX ADMIN — Medication 4: at 17:17

## 2022-11-21 RX ADMIN — Medication 2: at 13:11

## 2022-11-21 RX ADMIN — BUDESONIDE AND FORMOTEROL FUMARATE DIHYDRATE 2 PUFF(S): 160; 4.5 AEROSOL RESPIRATORY (INHALATION) at 09:39

## 2022-11-21 RX ADMIN — Medication 8: at 21:26

## 2022-11-21 RX ADMIN — APIXABAN 5 MILLIGRAM(S): 2.5 TABLET, FILM COATED ORAL at 09:59

## 2022-11-21 RX ADMIN — GABAPENTIN 100 MILLIGRAM(S): 400 CAPSULE ORAL at 21:23

## 2022-11-21 RX ADMIN — SIMVASTATIN 20 MILLIGRAM(S): 20 TABLET, FILM COATED ORAL at 21:21

## 2022-11-21 RX ADMIN — PANTOPRAZOLE SODIUM 40 MILLIGRAM(S): 20 TABLET, DELAYED RELEASE ORAL at 05:07

## 2022-11-21 RX ADMIN — Medication 7 UNIT(S): at 17:18

## 2022-11-21 RX ADMIN — Medication 300 MILLIGRAM(S): at 09:59

## 2022-11-21 RX ADMIN — Medication 7 UNIT(S): at 13:12

## 2022-11-21 RX ADMIN — TIOTROPIUM BROMIDE 1 CAPSULE(S): 18 CAPSULE ORAL; RESPIRATORY (INHALATION) at 09:39

## 2022-11-21 RX ADMIN — BUDESONIDE AND FORMOTEROL FUMARATE DIHYDRATE 2 PUFF(S): 160; 4.5 AEROSOL RESPIRATORY (INHALATION) at 20:27

## 2022-11-21 RX ADMIN — PIPERACILLIN AND TAZOBACTAM 25 GRAM(S): 4; .5 INJECTION, POWDER, LYOPHILIZED, FOR SOLUTION INTRAVENOUS at 05:04

## 2022-11-21 RX ADMIN — ZOLPIDEM TARTRATE 5 MILLIGRAM(S): 10 TABLET ORAL at 21:54

## 2022-11-21 RX ADMIN — Medication 2 PACKET(S): at 09:58

## 2022-11-21 NOTE — PROVIDER CONTACT NOTE (OTHER) - DATE AND TIME:
15-Nov-2022 06:37
15-Nov-2022 06:44
15-Nov-2022 04:25
15-Nov-2022 23:42
18-Nov-2022 09:27
15-Nov-2022 06:41
21-Nov-2022 04:44
15-Nov-2022 12:04

## 2022-11-21 NOTE — DISCHARGE NOTE NURSING/CASE MANAGEMENT/SOCIAL WORK - NSDCPEFALRISK_GEN_ALL_CORE
For information on Fall & Injury Prevention, visit: [email or web address] OR  [email or web address] OR  https://www.cdc.gov/steadi/patient.html

## 2022-11-21 NOTE — PROGRESS NOTE ADULT - SUBJECTIVE AND OBJECTIVE BOX
CC: Abd pain    HPI:  81 year old woman with PMHx of COPD, chronic respiratory failure (on 6L NC), colitis, PUD, mild AS, DM2, HTN, HLD, ANU, Pafib presents with abdominal pain.  Denies any history of abd pain, onset is sudden, 10 out of 10 causing her to come to ED for further evaluation.  In ED found to have acute cholecystitis.  SHe was given IV antibiotics, surgery notified and admitted to [facility].     She underwent Lap fredrick. Post op transferred to [facility] for hypoxia.   Now at baseline o2 requirement, being transferred out of [facility] per intensivist. Hospitalist service asked to resume care.     pt seen and examined this am. Tired. Didn't sleep well last night. resp status stable. no sob/chest pain at rest. Tolerating po. ambulated yesterday.     REVIEW OF SYSTEMS: All other review of systems is negative unless indicated above.    Vital Signs Last 24 Hrs  T(C): 36.7 (21 Nov 2022 08:30), Max: 37.1 (21 Nov 2022 06:00)  T(F): 98.1 (21 Nov 2022 08:30), Max: 98.7 (21 Nov 2022 06:00)  HR: 74 (21 Nov 2022 12:00) (51 - 76)  BP: 149/60 (21 Nov 2022 10:00) (129/51 - 161/82)  BP(mean): 84 (21 Nov 2022 10:00) (75 - 122)  RR: 16 (21 Nov 2022 12:00) (14 - 28)  SpO2: 93% (21 Nov 2022 11:00) (90% - 98%)    Parameters below as of 21 Nov 2022 08:00  Patient On (Oxygen Delivery Method): nasal cannula        PHYSICAL EXAM:    GENERAL: Comfortable, no acute distress   HEAD:  Normocephalic, atraumatic  EYES: EOMI, PERRLA  HEENT: Moist mucous membranes  NECK: Supple, No JVD  NERVOUS SYSTEM:  Alert & Oriented X3, Motor Strength 5/5 B/L upper and lower extremities  CHEST/LUNG: Rales at bases bilaterally somewhat improved from yesterday  HEART: Regular rate and rhythm, JOSE ANGEL   ABDOMEN: Soft, appropriate post op tenderness, Nondistended, Bowel sounds present.   GENITOURINARY: Voiding, no palpable bladder  EXTREMITIES:   No clubbing, cyanosis, or edema  MUSCULOSKELETAL- No muscle tenderness, no joint tenderness  SKIN-no rash    LABS:                        9.7    9.47  )-----------( 341      ( 19 Nov 2022 05:14 )             30.6     11-19    139  |  107  |  14  ----------------------------<  149<H>  3.9   |  28  |  0.78    Ca    8.0<L>      19 Nov 2022 05:14  Phos  2.1     11-19  Mg     2.1     11-19    TPro  6.6  /  Alb  2.6<L>  /  TBili  0.3  /  DBili  0.1  /  AST  27  /  ALT  189<H>  /  AlkPhos  122<H>  11-19      MEDICATIONS  (STANDING):  allopurinol 300 milliGRAM(s) Oral daily  apixaban 5 milliGRAM(s) Oral two times a day  budesonide  80 MICROgram(s)/formoterol 4.5 MICROgram(s) Inhaler 2 Puff(s) Inhalation two times a day  dextrose 5%. 1000 milliLiter(s) (50 mL/Hr) IV Continuous <Continuous>  dextrose 5%. 1000 milliLiter(s) (100 mL/Hr) IV Continuous <Continuous>  dextrose 50% Injectable 25 Gram(s) IV Push once  dextrose 50% Injectable 12.5 Gram(s) IV Push once  dextrose 50% Injectable 25 Gram(s) IV Push once  diltiazem    milliGRAM(s) Oral daily  furosemide    Tablet 20 milliGRAM(s) Oral daily  gabapentin 100 milliGRAM(s) Oral at bedtime  glucagon  Injectable 1 milliGRAM(s) IntraMuscular once  guaiFENesin  milliGRAM(s) Oral every 12 hours  insulin glargine Injectable (LANTUS) 15 Unit(s) SubCutaneous at bedtime  insulin lispro (ADMELOG) corrective regimen sliding scale   SubCutaneous Before meals and at bedtime  insulin lispro Injectable (ADMELOG) 7 Unit(s) SubCutaneous three times a day before meals  mesalamine ER (24-Hour) Capsule 1.5 Gram(s) Oral daily  metoprolol succinate ER 25 milliGRAM(s) Oral daily  pantoprazole    Tablet 40 milliGRAM(s) Oral before breakfast  piperacillin/tazobactam IVPB.. 3.375 Gram(s) IV Intermittent every 8 hours  predniSONE   Tablet 40 milliGRAM(s) Oral daily  simvastatin 20 milliGRAM(s) Oral at bedtime  tiotropium 18 MICROgram(s) Capsule 1 Capsule(s) Inhalation daily    MEDICATIONS  (PRN):  acetaminophen     Tablet .. 325 milliGRAM(s) Oral every 4 hours PRN Temp greater or equal to 38C (100.4F), Mild Pain (1 - 3)  dextrose Oral Gel 15 Gram(s) Oral once PRN Blood Glucose LESS THAN 70 milliGRAM(s)/deciliter  melatonin 3 milliGRAM(s) Oral at bedtime PRN Insomnia  morphine  - Injectable 4 milliGRAM(s) IV Push every 4 hours PRN breakthrough pain  morphine  - Injectable 4 milliGRAM(s) IV Push every 4 hours PRN Severe Pain (7 - 10)  ondansetron Injectable 4 milliGRAM(s) IV Push every 6 hours PRN Nausea  oxycodone    5 mG/acetaminophen 325 mG 1 Tablet(s) Oral every 4 hours PRN Moderate Pain (4 - 6)          ASSESSMENT AND PLAN:  81f, PMH as above a/w      1. Acute cholecystitis-  -s/p lap fredrick pod#4  -tolerating po.   -on iv zosyn.   -ambulate.   -incentive spirometry.     2. Acute on chr hypoxic resp failure d/t copd exacerbation:  -CT chest with distal airway impaction.  -mucinex  -chest pt if able.   -s/p iv solumedrol, now on prednisone per pulm  -on baseline o2 via nasal cannula (6L)  -continue advair, spiriva.     3. HTN  -continue cardizem /bb/statin.     4. Mild aortic stenosis with mild pulmonary edema:  -iv lasix X 1 yesterday.   -resume home dose po lasix (20 mg daily)    5. Brief SVT yesterday:  -continue bb, ccb    6. Paroxysmal atrial fibrillation:  -continue bb/ccb/eliquis.     7. DMII:  -sugars likely uncontrolled d/t steroids.   -started lantus/premeal insulin.   -increase premeal insulin to 7, continue lantus at 15.  -continue ss.    8. HLD:  -continue statin.     9. DVT px:  -eliquis

## 2022-11-21 NOTE — PROVIDER CONTACT NOTE (OTHER) - SITUATION
Patient had 7 beats of SVT/Vtach on the monitor, received IVP lasix the evening of 11/20, possible electrolyte derangement, AM labs ordered to be drawn for now

## 2022-11-21 NOTE — PROVIDER CONTACT NOTE (OTHER) - REASON
CARDIO CONSULT
notified pcp
Consult
PULMONARY CONSULT
Callback
Dr Stew Anton (PCP)
blood sugar 302- no sliding scale.
EKG changes on monitor

## 2022-11-21 NOTE — DISCHARGE NOTE NURSING/CASE MANAGEMENT/SOCIAL WORK - PATIENT PORTAL LINK FT
You can access the FollowMyHealth Patient Portal offered by [facility] by registering at the following website: [email or web address]. By joining [employer]’s FollowMyHealth portal, you will also be able to view your health information using other applications (apps) compatible with our system.

## 2022-11-21 NOTE — PROVIDER CONTACT NOTE (OTHER) - ASSESSMENT
Patient asleep, resting comfortably in bed, A&Ox4. no c/o chest pain or discomfort and vital signs otherwise stable, ambulating to rest room with PCA, safety maintained

## 2022-11-21 NOTE — PROGRESS NOTE ADULT - SUBJECTIVE AND OBJECTIVE BOX
Feels well  VSS afeb  lungs- slight diminished BS left base  Cor- RRR  Abd- + BS soft non tender  Ext- no edema

## 2022-11-21 NOTE — PROGRESS NOTE ADULT - SUBJECTIVE AND OBJECTIVE BOX
Subjective:    pat better, sitting in bed, no respiratory distress.    Home Medications:  allopurinol 300 mg oral tablet: 1 tab(s) orally once a day (15 Nov 2022 09:50)  Apriso 0.375 g oral capsule, extended release: 4 cap(s) orally once a day (15 Nov 2022 09:50)  Cartia  mg/24 hours oral capsule, extended release: 1 cap(s) orally once a day (15 Nov 2022 09:50)  CoQ10: orally once a day (15 Nov 2022 09:50)  dicyclomine 10 mg oral capsule: 1 cap(s) orally 3 times a day, As Needed (15 Nov 2022 09:50)  Eliquis 5 mg oral tablet: 1 tab(s) orally 2 times a day  Resume at 11pm tonight (8/19/20) (15 Nov 2022 09:50)  furosemide 20 mg oral tablet: 1 tab(s) orally once a day (15 Nov 2022 09:50)  gabapentin 100 mg oral tablet: 1 tab(s) orally once a day (at bedtime) (15 Nov 2022 09:50)  metFORMIN 500 mg oral tablet: 2 tab(s) orally once a day (in the morning) (15 Nov 2022 09:50)  metFORMIN 500 mg oral tablet: 1 tab(s) orally once (at bedtime) (15 Nov 2022 09:50)  Metoprolol Succinate ER 25 mg oral tablet, extended release: 1 tab(s) orally once a day (15 Nov 2022 09:50)  pantoprazole 40 mg oral delayed release tablet: 1 tab(s) orally once a day (15 Nov 2022 09:50)  Probiotic Formula oral capsule: 1 cap(s) orally 2 times a day (15 Nov 2022 09:50)  Prolia 60 mg/mL subcutaneous solution: subcutaneous every 6 months (15 Nov 2022 09:50)  simvastatin 20 mg oral tablet: 1 tab(s) orally once a day (at bedtime) (15 Nov 2022 09:50)  Trelegy Ellipta 100 mcg-62.5 mcg-25 mcg/inh inhalation powder: 1 puff(s) inhaled once a day (15 Nov 2022 09:50)  Vitamin D3 25 mcg (1000 intl units) oral tablet: 1 tab(s) orally once a day (15 Nov 2022 09:50)  zolpidem 5 mg oral tablet: 1 tab(s) orally once a day (at bedtime), As Needed (15 Nov 2022 09:50)    MEDICATIONS  (STANDING):  allopurinol 300 milliGRAM(s) Oral daily  apixaban 5 milliGRAM(s) Oral two times a day  budesonide  80 MICROgram(s)/formoterol 4.5 MICROgram(s) Inhaler 2 Puff(s) Inhalation two times a day  dextrose 5%. 1000 milliLiter(s) (50 mL/Hr) IV Continuous <Continuous>  dextrose 5%. 1000 milliLiter(s) (100 mL/Hr) IV Continuous <Continuous>  dextrose 50% Injectable 25 Gram(s) IV Push once  dextrose 50% Injectable 12.5 Gram(s) IV Push once  dextrose 50% Injectable 25 Gram(s) IV Push once  diltiazem    milliGRAM(s) Oral daily  furosemide    Tablet 20 milliGRAM(s) Oral daily  gabapentin 100 milliGRAM(s) Oral at bedtime  glucagon  Injectable 1 milliGRAM(s) IntraMuscular once  guaiFENesin  milliGRAM(s) Oral every 12 hours  insulin glargine Injectable (LANTUS) 15 Unit(s) SubCutaneous at bedtime  insulin lispro (ADMELOG) corrective regimen sliding scale   SubCutaneous Before meals and at bedtime  insulin lispro Injectable (ADMELOG) 7 Unit(s) SubCutaneous three times a day before meals  mesalamine ER (24-Hour) Capsule 1.5 Gram(s) Oral daily  metoprolol succinate ER 25 milliGRAM(s) Oral daily  pantoprazole    Tablet 40 milliGRAM(s) Oral before breakfast  piperacillin/tazobactam IVPB.. 3.375 Gram(s) IV Intermittent every 8 hours  predniSONE   Tablet 40 milliGRAM(s) Oral daily  simvastatin 20 milliGRAM(s) Oral at bedtime  tiotropium 18 MICROgram(s) Capsule 1 Capsule(s) Inhalation daily    MEDICATIONS  (PRN):  acetaminophen     Tablet .. 325 milliGRAM(s) Oral every 4 hours PRN Temp greater or equal to 38C (100.4F), Mild Pain (1 - 3)  dextrose Oral Gel 15 Gram(s) Oral once PRN Blood Glucose LESS THAN 70 milliGRAM(s)/deciliter  melatonin 3 milliGRAM(s) Oral at bedtime PRN Insomnia  morphine  - Injectable 4 milliGRAM(s) IV Push every 4 hours PRN breakthrough pain  morphine  - Injectable 4 milliGRAM(s) IV Push every 4 hours PRN Severe Pain (7 - 10)  ondansetron Injectable 4 milliGRAM(s) IV Push every 6 hours PRN Nausea  oxycodone    5 mG/acetaminophen 325 mG 1 Tablet(s) Oral every 4 hours PRN Moderate Pain (4 - 6)      Allergies    aspirin (Stomach Upset)    Intolerances        Vital Signs Last 24 Hrs  T(C): 36.6 (21 Nov 2022 14:21), Max: 37.1 (21 Nov 2022 06:00)  T(F): 97.9 (21 Nov 2022 14:21), Max: 98.7 (21 Nov 2022 06:00)  HR: 68 (21 Nov 2022 16:00) (51 - 85)  BP: 130/56 (21 Nov 2022 16:00) (129/51 - 161/82)  BP(mean): 71 (21 Nov 2022 16:00) (71 - 122)  RR: 16 (21 Nov 2022 16:00) (14 - 28)  SpO2: 91% (21 Nov 2022 16:00) (90% - 98%)    Parameters below as of 21 Nov 2022 16:00  Patient On (Oxygen Delivery Method): nasal cannula  O2 Flow (L/min): 6        PHYSICAL EXAMINATION:    NECK:  Supple. No lymphadenopathy. Jugular venous pressure not elevated. Carotids equal.   HEART:   The cardiac impulse has a normal quality. Reg., Nl S1 and S2.  There are no murmurs, rubs or gallops noted  CHEST:  Chest rhonchi to auscultation. Normal respiratory effort.  ABDOMEN:  Soft and nontender.   EXTREMITIES:  There is no edema.       LABS:                        10.2   10.30 )-----------( 350      ( 21 Nov 2022 05:29 )             32.3     11-21    141  |  106  |  17  ----------------------------<  142<H>  3.6   |  31  |  0.85    Ca    8.1<L>      21 Nov 2022 05:29  Phos  2.0     11-21  Mg     2.0     11-21    TPro  6.3  /  Alb  2.7<L>  /  TBili  0.4  /  DBili  x   /  AST  15  /  ALT  93<H>  /  AlkPhos  104  11-21

## 2022-11-21 NOTE — PROVIDER CONTACT NOTE (OTHER) - BACKGROUND
Patient presented from home with abd pain  and underwent a lap fredrick on 11/17 PMH significant for COPD (6L at home), colitis, mild AS< DM2, HTN, HLD, ANU and P afib. cardizem 240 mg/lopressor 25 qd

## 2022-11-21 NOTE — PROVIDER CONTACT NOTE (OTHER) - RECOMMENDATIONS
AM labs rescheduled for now Burow's Advancement Flap Text: The defect edges were debeveled with a #15 scalpel blade.  Given the location of the defect and the proximity to free margins a Burow's advancement flap was deemed most appropriate.  Using a sterile surgical marker, the appropriate advancement flap was drawn incorporating the defect and placing the expected incisions within the relaxed skin tension lines where possible.    The area thus outlined was incised deep to adipose tissue with a #15 scalpel blade.  The skin margins were undermined to an appropriate distance in all directions utilizing iris scissors.

## 2022-11-22 ENCOUNTER — TRANSCRIPTION ENCOUNTER (OUTPATIENT)
Age: 82
End: 2022-11-22

## 2022-11-22 VITALS
HEART RATE: 76 BPM | OXYGEN SATURATION: 94 % | TEMPERATURE: 99 F | RESPIRATION RATE: 18 BRPM | DIASTOLIC BLOOD PRESSURE: 70 MMHG | SYSTOLIC BLOOD PRESSURE: 163 MMHG

## 2022-11-22 PROCEDURE — 99232 SBSQ HOSP IP/OBS MODERATE 35: CPT

## 2022-11-22 RX ADMIN — Medication 40 MILLIGRAM(S): at 09:32

## 2022-11-22 RX ADMIN — PIPERACILLIN AND TAZOBACTAM 25 GRAM(S): 4; .5 INJECTION, POWDER, LYOPHILIZED, FOR SOLUTION INTRAVENOUS at 05:34

## 2022-11-22 RX ADMIN — Medication 600 MILLIGRAM(S): at 09:34

## 2022-11-22 RX ADMIN — APIXABAN 5 MILLIGRAM(S): 2.5 TABLET, FILM COATED ORAL at 09:34

## 2022-11-22 RX ADMIN — Medication 25 MILLIGRAM(S): at 09:32

## 2022-11-22 RX ADMIN — Medication 1.5 GRAM(S): at 09:34

## 2022-11-22 RX ADMIN — Medication 240 MILLIGRAM(S): at 09:33

## 2022-11-22 RX ADMIN — Medication 300 MILLIGRAM(S): at 09:33

## 2022-11-22 RX ADMIN — BUDESONIDE AND FORMOTEROL FUMARATE DIHYDRATE 2 PUFF(S): 160; 4.5 AEROSOL RESPIRATORY (INHALATION) at 08:06

## 2022-11-22 RX ADMIN — Medication 7 UNIT(S): at 07:58

## 2022-11-22 RX ADMIN — Medication 20 MILLIGRAM(S): at 09:32

## 2022-11-22 RX ADMIN — PANTOPRAZOLE SODIUM 40 MILLIGRAM(S): 20 TABLET, DELAYED RELEASE ORAL at 05:34

## 2022-11-22 RX ADMIN — TIOTROPIUM BROMIDE 1 CAPSULE(S): 18 CAPSULE ORAL; RESPIRATORY (INHALATION) at 08:06

## 2022-11-22 NOTE — PROGRESS NOTE ADULT - SUBJECTIVE AND OBJECTIVE BOX
CC: Abd pain    HPI:  81 year old woman with PMHx of COPD, chronic respiratory failure (on 6L NC), colitis, PUD, mild AS, DM2, HTN, HLD, ANU, Pafib presents with abdominal pain.  Denies any history of abd pain, onset is sudden, 10 out of 10 causing her to come to ED for further evaluation.  In ED found to have acute cholecystitis.  SHe was given IV antibiotics, surgery notified and admitted to [facility].     She underwent Lap fredrick. Post op transferred to sicu for hypoxia.   Now at baseline o2 requirement, being transferred out of sicu per intensivist. Hospitalist service asked to resume care.     pt seen and examined this am. doing well. no sob /chest pain. no abd pain. NO n/v. tolerating po.     REVIEW OF SYSTEMS: All 10 systems reviewed and is as above otherwise negative.    Vital Signs Last 24 Hrs  T(C): 37.2 (22 Nov 2022 08:59), Max: 37.2 (22 Nov 2022 08:59)  T(F): 99 (22 Nov 2022 08:59), Max: 99 (22 Nov 2022 08:59)  HR: 76 (22 Nov 2022 08:59) (55 - 85)  BP: 163/70 (22 Nov 2022 08:59) (130/56 - 163/70)  BP(mean): 81 (21 Nov 2022 20:39) (71 - 89)  RR: 18 (22 Nov 2022 08:59) (14 - 22)  SpO2: 94% (22 Nov 2022 08:59) (85% - 98%)    Parameters below as of 22 Nov 2022 08:59  Patient On (Oxygen Delivery Method): nasal cannula  O2 Flow (L/min): 4        PHYSICAL EXAM:    GENERAL: Comfortable, no acute distress   HEAD:  Normocephalic, atraumatic  EYES: EOMI, PERRLA  HEENT: Moist mucous membranes  NECK: Supple, No JVD  NERVOUS SYSTEM:  Alert & Oriented X3, Motor Strength 5/5 B/L upper and lower extremities  CHEST/LUNG: Rales at bases bilaterally somewhat improved from yesterday  HEART: Regular rate and rhythm, JOSE ANGEL   ABDOMEN: Soft, non tender, Nondistended, Bowel sounds present.   GENITOURINARY: Voiding, no palpable bladder  EXTREMITIES:   No clubbing, cyanosis, or edema  MUSCULOSKELETAL- No muscle tenderness, no joint tenderness  SKIN-no rash    LABS:                        10.2   10.30 )-----------( 350      ( 21 Nov 2022 05:29 )             32.3     11-21    141  |  106  |  17  ----------------------------<  142<H>  3.6   |  31  |  0.85    Ca    8.1<L>      21 Nov 2022 05:29  Phos  2.0     11-21  Mg     2.0     11-21    TPro  6.3  /  Alb  2.7<L>  /  TBili  0.4  /  DBili  x   /  AST  15  /  ALT  93<H>  /  AlkPhos  104  11-21          MEDICATIONS  (STANDING):  allopurinol 300 milliGRAM(s) Oral daily  apixaban 5 milliGRAM(s) Oral two times a day  budesonide  80 MICROgram(s)/formoterol 4.5 MICROgram(s) Inhaler 2 Puff(s) Inhalation two times a day  dextrose 5%. 1000 milliLiter(s) (50 mL/Hr) IV Continuous <Continuous>  dextrose 5%. 1000 milliLiter(s) (100 mL/Hr) IV Continuous <Continuous>  dextrose 50% Injectable 25 Gram(s) IV Push once  dextrose 50% Injectable 12.5 Gram(s) IV Push once  dextrose 50% Injectable 25 Gram(s) IV Push once  diltiazem    milliGRAM(s) Oral daily  furosemide    Tablet 20 milliGRAM(s) Oral daily  gabapentin 100 milliGRAM(s) Oral at bedtime  glucagon  Injectable 1 milliGRAM(s) IntraMuscular once  guaiFENesin  milliGRAM(s) Oral every 12 hours  insulin glargine Injectable (LANTUS) 15 Unit(s) SubCutaneous at bedtime  insulin lispro (ADMELOG) corrective regimen sliding scale   SubCutaneous Before meals and at bedtime  insulin lispro Injectable (ADMELOG) 7 Unit(s) SubCutaneous three times a day before meals  mesalamine ER (24-Hour) Capsule 1.5 Gram(s) Oral daily  metoprolol succinate ER 25 milliGRAM(s) Oral daily  pantoprazole    Tablet 40 milliGRAM(s) Oral before breakfast  piperacillin/tazobactam IVPB.. 3.375 Gram(s) IV Intermittent every 8 hours  predniSONE   Tablet 40 milliGRAM(s) Oral daily  simvastatin 20 milliGRAM(s) Oral at bedtime  tiotropium 18 MICROgram(s) Capsule 1 Capsule(s) Inhalation daily    MEDICATIONS  (PRN):  acetaminophen     Tablet .. 325 milliGRAM(s) Oral every 4 hours PRN Temp greater or equal to 38C (100.4F), Mild Pain (1 - 3)  dextrose Oral Gel 15 Gram(s) Oral once PRN Blood Glucose LESS THAN 70 milliGRAM(s)/deciliter  melatonin 3 milliGRAM(s) Oral at bedtime PRN Insomnia  morphine  - Injectable 4 milliGRAM(s) IV Push every 4 hours PRN breakthrough pain  morphine  - Injectable 4 milliGRAM(s) IV Push every 4 hours PRN Severe Pain (7 - 10)  ondansetron Injectable 4 milliGRAM(s) IV Push every 6 hours PRN Nausea  oxycodone    5 mG/acetaminophen 325 mG 1 Tablet(s) Oral every 4 hours PRN Moderate Pain (4 - 6)          ASSESSMENT AND PLAN:  81f, PMH as above a/w      1. Acute cholecystitis-  -s/p lap fredrick  -tolerating po.   -ambulate.   -incentive spirometry.     2. Acute on chr hypoxic resp failure d/t copd exacerbation-improved.   -s/p 8 days steroids.   -can likely dc off steroids.   -on baseline o2 via nasal cannula (6L)  -continue advair, spiriva.     3. HTN  -continue cardizem /bb/statin.     4. Mild aortic stenosis with mild pulmonary edema/Acute HFpEF  -s/p iv lasix.   -home dose oral lasix  resumed.     5. Brief SVT   -continue bb, ccb    6. Paroxysmal atrial fibrillation:  -continue bb/ccb/eliquis.     7. DMII:  -sugars controlled on current dose lantus/premeal insulin +SS  -resume home meds on dc.       8. HLD:  -continue statin.     9. DVT px:  -eliquis

## 2022-11-22 NOTE — DISCHARGE NOTE PROVIDER - NSDCMRMEDTOKEN_GEN_ALL_CORE_FT
allopurinol 300 mg oral tablet: 1 tab(s) orally once a day  Apriso 0.375 g oral capsule, extended release: 4 cap(s) orally once a day  Cartia  mg/24 hours oral capsule, extended release: 1 cap(s) orally once a day  CoQ10: orally once a day  dicyclomine 10 mg oral capsule: 1 cap(s) orally 3 times a day, As Needed  Eliquis 5 mg oral tablet: 1 tab(s) orally 2 times a day  Resume at 11pm tonight (8/19/20)  furosemide 20 mg oral tablet: 1 tab(s) orally once a day  gabapentin 100 mg oral tablet: 1 tab(s) orally once a day (at bedtime)  metFORMIN 500 mg oral tablet: 2 tab(s) orally once a day (in the morning)  metFORMIN 500 mg oral tablet: 1 tab(s) orally once (at bedtime)  Metoprolol Succinate ER 25 mg oral tablet, extended release: 1 tab(s) orally once a day  pantoprazole 40 mg oral delayed release tablet: 1 tab(s) orally once a day  Probiotic Formula oral capsule: 1 cap(s) orally 2 times a day  Prolia 60 mg/mL subcutaneous solution: subcutaneous every 6 months  simvastatin 20 mg oral tablet: 1 tab(s) orally once a day (at bedtime)  Trelegy Ellipta 100 mcg-62.5 mcg-25 mcg/inh inhalation powder: 1 puff(s) inhaled once a day  Vitamin D3 25 mcg (1000 intl units) oral tablet: 1 tab(s) orally once a day  zolpidem 5 mg oral tablet: 1 tab(s) orally once a day (at bedtime), As Needed

## 2022-11-22 NOTE — DISCHARGE NOTE PROVIDER - CARE PROVIDER_API CALL
Charlie Maravilla  SURGERY  [address] [place] [address]  Phone: [phone]  Fax: [phone]  Follow Up Time:

## 2022-11-22 NOTE — DISCHARGE NOTE PROVIDER - HOSPITAL COURSE
81 year old woman with PMHx of COPD, chronic respiratory failure (on 6L NC), colitis, PUD, mild AS, DM2, HTN, HLD, ANU, Pafib presents with abdominal pain.  Denies any history of abd pain, onset is sudden, 10 out of 10 causing her to come to ED for further evaluation.  In ED found to have acute cholecystitis.  SHe was given IV antibiotics, surgery notified and admitted to [facility].     She underwent Lap fredrick. Post op transferred to [facility] for hypoxia.   Now at baseline o2 requirement ; transferred out of [facility]. Stable for discharge

## 2022-11-22 NOTE — PROGRESS NOTE ADULT - SUBJECTIVE AND OBJECTIVE BOX
Subjective:    pat better, no new complaint, ambulating in hallway.    Home Medications:  allopurinol 300 mg oral tablet: 1 tab(s) orally once a day (15 Nov 2022 09:50)  Apriso 0.375 g oral capsule, extended release: 4 cap(s) orally once a day (15 Nov 2022 09:50)  Cartia  mg/24 hours oral capsule, extended release: 1 cap(s) orally once a day (15 Nov 2022 09:50)  CoQ10: orally once a day (15 Nov 2022 09:50)  dicyclomine 10 mg oral capsule: 1 cap(s) orally 3 times a day, As Needed (15 Nov 2022 09:50)  Eliquis 5 mg oral tablet: 1 tab(s) orally 2 times a day  Resume at 11pm tonight (8/19/20) (15 Nov 2022 09:50)  furosemide 20 mg oral tablet: 1 tab(s) orally once a day (15 Nov 2022 09:50)  gabapentin 100 mg oral tablet: 1 tab(s) orally once a day (at bedtime) (15 Nov 2022 09:50)  metFORMIN 500 mg oral tablet: 2 tab(s) orally once a day (in the morning) (15 Nov 2022 09:50)  metFORMIN 500 mg oral tablet: 1 tab(s) orally once (at bedtime) (15 Nov 2022 09:50)  Metoprolol Succinate ER 25 mg oral tablet, extended release: 1 tab(s) orally once a day (15 Nov 2022 09:50)  pantoprazole 40 mg oral delayed release tablet: 1 tab(s) orally once a day (15 Nov 2022 09:50)  Probiotic Formula oral capsule: 1 cap(s) orally 2 times a day (15 Nov 2022 09:50)  Prolia 60 mg/mL subcutaneous solution: subcutaneous every 6 months (15 Nov 2022 09:50)  simvastatin 20 mg oral tablet: 1 tab(s) orally once a day (at bedtime) (15 Nov 2022 09:50)  Trelegy Ellipta 100 mcg-62.5 mcg-25 mcg/inh inhalation powder: 1 puff(s) inhaled once a day (15 Nov 2022 09:50)  Vitamin D3 25 mcg (1000 intl units) oral tablet: 1 tab(s) orally once a day (15 Nov 2022 09:50)  zolpidem 5 mg oral tablet: 1 tab(s) orally once a day (at bedtime), As Needed (15 Nov 2022 09:50)    MEDICATIONS  (STANDING):  allopurinol 300 milliGRAM(s) Oral daily  apixaban 5 milliGRAM(s) Oral two times a day  budesonide  80 MICROgram(s)/formoterol 4.5 MICROgram(s) Inhaler 2 Puff(s) Inhalation two times a day  dextrose 5%. 1000 milliLiter(s) (100 mL/Hr) IV Continuous <Continuous>  dextrose 5%. 1000 milliLiter(s) (50 mL/Hr) IV Continuous <Continuous>  dextrose 50% Injectable 25 Gram(s) IV Push once  dextrose 50% Injectable 12.5 Gram(s) IV Push once  dextrose 50% Injectable 25 Gram(s) IV Push once  diltiazem    milliGRAM(s) Oral daily  furosemide    Tablet 20 milliGRAM(s) Oral daily  gabapentin 100 milliGRAM(s) Oral at bedtime  glucagon  Injectable 1 milliGRAM(s) IntraMuscular once  guaiFENesin  milliGRAM(s) Oral every 12 hours  insulin glargine Injectable (LANTUS) 15 Unit(s) SubCutaneous at bedtime  insulin lispro (ADMELOG) corrective regimen sliding scale   SubCutaneous Before meals and at bedtime  insulin lispro Injectable (ADMELOG) 7 Unit(s) SubCutaneous three times a day before meals  mesalamine ER (24-Hour) Capsule 1.5 Gram(s) Oral daily  metoprolol succinate ER 25 milliGRAM(s) Oral daily  pantoprazole    Tablet 40 milliGRAM(s) Oral before breakfast  piperacillin/tazobactam IVPB.. 3.375 Gram(s) IV Intermittent every 8 hours  predniSONE   Tablet 40 milliGRAM(s) Oral daily  simvastatin 20 milliGRAM(s) Oral at bedtime  tiotropium 18 MICROgram(s) Capsule 1 Capsule(s) Inhalation daily    MEDICATIONS  (PRN):  acetaminophen     Tablet .. 325 milliGRAM(s) Oral every 4 hours PRN Temp greater or equal to 38C (100.4F), Mild Pain (1 - 3)  dextrose Oral Gel 15 Gram(s) Oral once PRN Blood Glucose LESS THAN 70 milliGRAM(s)/deciliter  melatonin 3 milliGRAM(s) Oral at bedtime PRN Insomnia  morphine  - Injectable 4 milliGRAM(s) IV Push every 4 hours PRN breakthrough pain  morphine  - Injectable 4 milliGRAM(s) IV Push every 4 hours PRN Severe Pain (7 - 10)  ondansetron Injectable 4 milliGRAM(s) IV Push every 6 hours PRN Nausea  oxycodone    5 mG/acetaminophen 325 mG 1 Tablet(s) Oral every 4 hours PRN Moderate Pain (4 - 6)  zolpidem 5 milliGRAM(s) Oral at bedtime PRN Insomnia      Allergies    aspirin (Stomach Upset)    Intolerances        Vital Signs Last 24 Hrs  T(C): 37.2 (22 Nov 2022 08:59), Max: 37.2 (22 Nov 2022 08:59)  T(F): 99 (22 Nov 2022 08:59), Max: 99 (22 Nov 2022 08:59)  HR: 76 (22 Nov 2022 08:59) (55 - 77)  BP: 163/70 (22 Nov 2022 08:59) (130/56 - 163/70)  BP(mean): 81 (21 Nov 2022 20:39) (71 - 81)  RR: 18 (22 Nov 2022 08:59) (14 - 18)  SpO2: 94% (22 Nov 2022 08:59) (85% - 98%)    Parameters below as of 22 Nov 2022 08:59  Patient On (Oxygen Delivery Method): nasal cannula  O2 Flow (L/min): 4        PHYSICAL EXAMINATION:    NECK:  Supple. No lymphadenopathy. Jugular venous pressure not elevated. Carotids equal.   HEART:   The cardiac impulse has a normal quality. Reg., Nl S1 and S2.  There are no murmurs, rubs or gallops noted  CHEST:  Chest is clear to auscultation. Normal respiratory effort.  ABDOMEN:  Soft and nontender.   EXTREMITIES:  There is no edema.       LABS:                        10.2   10.30 )-----------( 350      ( 21 Nov 2022 05:29 )             32.3     11-21    141  |  106  |  17  ----------------------------<  142<H>  3.6   |  31  |  0.85    Ca    8.1<L>      21 Nov 2022 05:29  Phos  2.0     11-21  Mg     2.0     11-21    TPro  6.3  /  Alb  2.7<L>  /  TBili  0.4  /  DBili  x   /  AST  15  /  ALT  93<H>  /  AlkPhos  104  11-21

## 2022-11-22 NOTE — PROGRESS NOTE ADULT - PROVIDER SPECIALTY LIST ADULT
Surgery
Cardiology
Hospitalist
Hospitalist
Pulmonology
Surgery
Cardiology
Hospitalist
Pulmonology
Surgery
Hospitalist
Hospitalist
Pulmonology
SICU
Surgery

## 2022-11-22 NOTE — PROGRESS NOTE ADULT - ASSESSMENT
A/P: 80 yo F with pmh COPD on 6L NC, colitis, PUD presents to  with acute onset of RUQ abdominal pain. Pt denies any event that provoked the episode, however states it became so bad she decided to come to  for additional chris/ and evaluation. In the ER, a sonogram revealed a distended gallbladder with presence of gallstones, indicating acute cholecystitis.  Postop care. Pt is s/p POD2 from lap choly. Pt has no active CP. SR on tele.  Stable from cardiac point of view  Recall if needed
Acute cholecystitis. Cont IVabx. Lap choley tomorrow
PROBLEMS:    Acute cholecystitis  AC on chronic hypoxaemic respiratory failure  COPD/EMPHYSEMA    PLAN:    pulmonary stable-decd planning  titrate fio2  Incentive brandon  OOB to chair  PO as per surgery  IV Zosyn  6-8L NC  Prednisone 40mg qdaily  pain control  DVT ppx-heparin 5000mg q12hr
Stable from surgical standpoint. DC drain. Medicine, pulmonary follow up.
80 yo F     PROBLEMS:    Acute cholecystitis  AC on chronic hypoxaemic respiratory failure  COPD/EMPHYSEMA    PLAN:    CT chest lower lung chronic changes pat condition is optimized there is no absolute contraindication for plan procedure  PO as per surgery  IVF/IV Zosyn  6-8L NC  IV solumedrols 40mg q8hr  pain control  d/w pat & daughter  DVT ppx-Elliquis on hold for OR
ASSESSMENT  80 yo female with PMHx COPD on 6L NC, colitis, PUD, pAFib (on eliquis), ANU, HTN, HLD, presents to  with acute onset of RUQ abdominal pain. In the ER, a sonogram revealed a distended gallbladder with presence of gallstones, indicating acute cholecystitis and a surgical consultation.  pt denies any previous h/o these attacks.  She has a past surgical hx of an open appendectomy when she was 16. No other abx sx reported.  She has NKDA.    RUQ US revealing  Distended gallbladder with thickened edematous gallbladder wall and multiple small gallstones.    Admitted to surgery for RUQ abd pain 2/2 acute cholecystitis  Was hypoxic in the PACU requiring vent mask -> upgraded to SICU    PLAN  - pain control prn  - BP and HR controlled. cont cardizem and toprol. resume Eliquis when okay with surgery  - on 6L NC sating 90-92% (pt states that her baseline is 91-92). Maintain O2 sat 88-92%  - encourage incentive spirometer  - cont steroids, spiriva, symbicort, albuterol inh. Pulm following  - adv diet as per surgery. bowel regimen  - monitor JASON output  - dc IV fluids as pt taking adequate PO  - Cr stable. monitor I & Os  - cont IV zosyn for acute cholecystitis. afebrile overnight. leukocytosis trending down. monitor temps  - DVT ppx - hep sq, SCDs  PT eval    Dispo: stable for floors with continuous pulse ox.     Will discuss with Dr. Casey
Stable post op s/p benjy santos. Prob DC am on home O2 therapy
Stable post op. Oxygenation improving. Restart eliquis. HENOK planning.
82 yo F     PROBLEMS:    Acute cholecystitis  AC on chronic hypoxaemic respiratory failure  COPD/EMPHYSEMA    PLAN:    pulmonary titrate fio2  Incentive brandon  OOB to chair  PO as per surgery  IV Zosyn  6-8L NC  Prednisone 40mg qdaily  pain control  d/w pat & sons  DVT ppx-heparin 5000mg q12hr
A/P: 82 yo F with pmh COPD on 6L NC, colitis, PUD presents to  with acute onset of RUQ abdominal pain. Pt denies any event that provoked the episode, however states it became so bad she decided to come to  for additional chris/ and evaluation. In the ER, a sonogram revealed a distended gallbladder with presence of gallstones, indicating acute cholecystitis.    1. Postop care. Pt is s/p POD1 from lap choly. Pt has no active CP. SR on tele.  Pt had LHC 8/20 showing normal cors. 2Decho with mild AS and normal LV fxn.  BP/HR stable- cont outpt regimen.    2. COPD. Pulm following postop. Pt uses 6L O2 at home. Now on VM.    3. HTN. BP stable. Cont current regimen.     4. DVT proph. Pain control. Will sign off. Please call with any questions.
Cholecystitis. Plan renzo cholharish. Unable to schedule on Wednesday and pt currently stable. Clear liquids as tolerated. IV abx. Renzo santos Thursday.
PROBLEMS:    Acute cholecystitis  AC on chronic hypoxaemic respiratory failure  COPD/EMPHYSEMA    PLAN:    pulmonary better  titrate fio2  Incentive brandon  OOB to chair  PO as per surgery  IV Zosyn  6-8L NC  Prednisone 40mg qdaily  pain control  d/w pat & sons  DVT ppx-heparin 5000mg q12hr  
Stable post op. Possible fluid overload. DC IVF. CXR
82 yo F     PROBLEMS:    Acute cholecystitis  AC on chronic hypoxaemic respiratory failure  COPD/EMPHYSEMA    PLAN:    CT chest lower lung chronic changes pat condition is optimized there is no absolute contraindication for plan procedure  PO as per surgery  IVF/IV Zosyn  6-8L NC  IV solumedrols 40mg q8hr  pain control  d/w pat & daughter  DVT ppx-Elliquis on hold for OR
82 yo F     PROBLEMS:    Acute cholecystitis  AC on chronic hypoxaemic respiratory failure  COPD/EMPHYSEMA    PLAN:    pulmonary titrate fio2  Incentive brandon  OOB to chair  PO as per surgery  IVF/IV Zosyn  6-8L NC  IV solumedrols 40mg qdaily  pain control  d/w pat & daughter  DVT ppx-Elliquis on hold for OR
PROBLEMS:    Acute cholecystitis  AC on chronic hypoxaemic respiratory failure  COPD/EMPHYSEMA    PLAN:    pulmonary stable-decd planning  titrate fio2  Incentive brandon  OOB to chair  PO as per surgery  IV Zosyn  6-8L NC  Prednisone 40mg qdaily  pain control  DVT ppx-heparin 5000mg q12hr

## 2022-11-22 NOTE — PROGRESS NOTE ADULT - REASON FOR ADMISSION
Acute cholecystitis

## 2022-11-27 DIAGNOSIS — J96.21 ACUTE AND CHRONIC RESPIRATORY FAILURE WITH HYPOXIA: ICD-10-CM

## 2022-11-27 DIAGNOSIS — I47.20 VENTRICULAR TACHYCARDIA, UNSPECIFIED: ICD-10-CM

## 2022-11-27 DIAGNOSIS — Z99.81 DEPENDENCE ON SUPPLEMENTAL OXYGEN: ICD-10-CM

## 2022-11-27 DIAGNOSIS — Z79.01 LONG TERM (CURRENT) USE OF ANTICOAGULANTS: ICD-10-CM

## 2022-11-27 DIAGNOSIS — G47.33 OBSTRUCTIVE SLEEP APNEA (ADULT) (PEDIATRIC): ICD-10-CM

## 2022-11-27 DIAGNOSIS — E78.5 HYPERLIPIDEMIA, UNSPECIFIED: ICD-10-CM

## 2022-11-27 DIAGNOSIS — I11.0 HYPERTENSIVE HEART DISEASE WITH HEART FAILURE: ICD-10-CM

## 2022-11-27 DIAGNOSIS — Z90.49 ACQUIRED ABSENCE OF OTHER SPECIFIED PARTS OF DIGESTIVE TRACT: ICD-10-CM

## 2022-11-27 DIAGNOSIS — Z82.3 FAMILY HISTORY OF STROKE: ICD-10-CM

## 2022-11-27 DIAGNOSIS — J43.9 EMPHYSEMA, UNSPECIFIED: ICD-10-CM

## 2022-11-27 DIAGNOSIS — Z80.9 FAMILY HISTORY OF MALIGNANT NEOPLASM, UNSPECIFIED: ICD-10-CM

## 2022-11-27 DIAGNOSIS — T38.0X5A ADVERSE EFFECT OF GLUCOCORTICOIDS AND SYNTHETIC ANALOGUES, INITIAL ENCOUNTER: ICD-10-CM
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
## 2022-11-27 DIAGNOSIS — I50.31 ACUTE DIASTOLIC (CONGESTIVE) HEART FAILURE: ICD-10-CM

## 2022-11-27 DIAGNOSIS — I48.0 PAROXYSMAL ATRIAL FIBRILLATION: ICD-10-CM

## 2022-11-27 DIAGNOSIS — Z88.6 ALLERGY STATUS TO ANALGESIC AGENT: ICD-10-CM

## 2022-11-27 DIAGNOSIS — Z82.49 FAMILY HISTORY OF ISCHEMIC HEART DISEASE AND OTHER DISEASES OF THE CIRCULATORY SYSTEM: ICD-10-CM

## 2022-11-27 DIAGNOSIS — E11.65 TYPE 2 DIABETES MELLITUS WITH HYPERGLYCEMIA: ICD-10-CM

## 2022-11-27 DIAGNOSIS — K80.00 CALCULUS OF GALLBLADDER WITH ACUTE CHOLECYSTITIS WITHOUT OBSTRUCTION: ICD-10-CM

## 2022-11-27 DIAGNOSIS — Z99.89 DEPENDENCE ON OTHER ENABLING MACHINES AND DEVICES: ICD-10-CM

## 2022-11-27 DIAGNOSIS — Z79.84 LONG TERM (CURRENT) USE OF ORAL HYPOGLYCEMIC DRUGS: ICD-10-CM

## 2022-11-27 DIAGNOSIS — I35.0 NONRHEUMATIC AORTIC (VALVE) STENOSIS: ICD-10-CM

## 2022-12-02 ENCOUNTER — OFFICE (OUTPATIENT)
Dept: URBAN - METROPOLITAN AREA CLINIC 102 | Facility: CLINIC | Age: 82
Setting detail: OPHTHALMOLOGY
End: 2022-12-02
Payer: MEDICARE

## 2022-12-02 DIAGNOSIS — H18.522: ICD-10-CM

## 2022-12-02 DIAGNOSIS — H43.393: ICD-10-CM

## 2022-12-02 DIAGNOSIS — H26.493: ICD-10-CM

## 2022-12-02 DIAGNOSIS — H18.521: ICD-10-CM

## 2022-12-02 DIAGNOSIS — E11.9: ICD-10-CM

## 2022-12-02 DIAGNOSIS — H18.523: ICD-10-CM

## 2022-12-02 DIAGNOSIS — H16.223: ICD-10-CM

## 2022-12-02 PROCEDURE — 92134 CPTRZ OPH DX IMG PST SGM RTA: CPT | Performed by: OPHTHALMOLOGY

## 2022-12-02 PROCEDURE — 92014 COMPRE OPH EXAM EST PT 1/>: CPT | Performed by: OPHTHALMOLOGY

## 2022-12-02 ASSESSMENT — CONFRONTATIONAL VISUAL FIELD TEST (CVF)
OD_FINDINGS: FULL
OS_FINDINGS: FULL

## 2022-12-02 ASSESSMENT — REFRACTION_AUTOREFRACTION
OD_SPHERE: 0.00
OS_CYLINDER: -1.75
OS_SPHERE: +1.25
OD_AXIS: 96
OS_AXIS: 112
OD_CYLINDER: -0.75

## 2022-12-02 ASSESSMENT — REFRACTION_MANIFEST
OD_AXIS: 090
OD_VA1: 20/25-
OS_SPHERE: +1.00
OD_CYLINDER: -1.00
OD_SPHERE: PLANO
OS_VA1: 20/40+
OS_CYLINDER: -2.25
OS_AXIS: 110

## 2022-12-02 ASSESSMENT — KERATOMETRY
OD_K2POWER_DIOPTERS: 42.00
OS_AXISANGLE_DEGREES: 30
OS_K2POWER_DIOPTERS: 41.75
OD_K1POWER_DIOPTERS: 40.75
OS_K1POWER_DIOPTERS: 40.75
OD_AXISANGLE_DEGREES: 172

## 2022-12-02 ASSESSMENT — SPHEQUIV_DERIVED
OS_SPHEQUIV: 0.375
OD_SPHEQUIV: -0.375
OS_SPHEQUIV: -0.125

## 2022-12-02 ASSESSMENT — TONOMETRY
OD_IOP_MMHG: 14
OS_IOP_MMHG: 14

## 2022-12-02 ASSESSMENT — AXIALLENGTH_DERIVED
OS_AL: 24.2924
OD_AL: 24.5564
OS_AL: 24.5008

## 2022-12-02 ASSESSMENT — VISUAL ACUITY
OD_BCVA: 20/25-2
OS_BCVA: 20/25-1

## 2022-12-07 ENCOUNTER — APPOINTMENT (OUTPATIENT)
Dept: GASTROENTEROLOGY | Facility: HOSPITAL | Age: 82
End: 2022-12-07

## 2022-12-30 ENCOUNTER — RX RENEWAL (OUTPATIENT)
Age: 82
End: 2022-12-30

## 2023-01-31 ENCOUNTER — RX RENEWAL (OUTPATIENT)
Age: 83
End: 2023-01-31

## 2023-05-26 ENCOUNTER — RX RENEWAL (OUTPATIENT)
Age: 83
End: 2023-05-26

## 2023-05-26 RX ORDER — DICYCLOMINE HYDROCHLORIDE 10 MG/1
10 CAPSULE ORAL
Qty: 90 | Refills: 3 | Status: ACTIVE | COMMUNITY
Start: 2022-08-11 | End: 1900-01-01

## 2023-06-22 ENCOUNTER — EMERGENCY (EMERGENCY)
Facility: HOSPITAL | Age: 83
LOS: 0 days | Discharge: HOME | End: 2023-06-23
Attending: EMERGENCY MEDICINE
Payer: MEDICARE

## 2023-06-22 VITALS
TEMPERATURE: 98 F | HEIGHT: 67 IN | OXYGEN SATURATION: 95 % | DIASTOLIC BLOOD PRESSURE: 61 MMHG | HEART RATE: 83 BPM | WEIGHT: 184.97 LBS | RESPIRATION RATE: 18 BRPM | SYSTOLIC BLOOD PRESSURE: 165 MMHG

## 2023-06-22 DIAGNOSIS — K21.9 GASTRO-ESOPHAGEAL REFLUX DISEASE WITHOUT ESOPHAGITIS: ICD-10-CM

## 2023-06-22 DIAGNOSIS — S42.292A OTHER DISPLACED FRACTURE OF UPPER END OF LEFT HUMERUS, INITIAL ENCOUNTER FOR CLOSED FRACTURE: ICD-10-CM

## 2023-06-22 DIAGNOSIS — Z79.01 LONG TERM (CURRENT) USE OF ANTICOAGULANTS: ICD-10-CM

## 2023-06-22 DIAGNOSIS — Z79.84 LONG TERM (CURRENT) USE OF ORAL HYPOGLYCEMIC DRUGS: ICD-10-CM

## 2023-06-22 DIAGNOSIS — Z99.89 DEPENDENCE ON OTHER ENABLING MACHINES AND DEVICES: ICD-10-CM
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
## 2023-06-22 DIAGNOSIS — Z88.6 ALLERGY STATUS TO ANALGESIC AGENT: ICD-10-CM

## 2023-06-22 DIAGNOSIS — Z90.49 ACQUIRED ABSENCE OF OTHER SPECIFIED PARTS OF DIGESTIVE TRACT: ICD-10-CM

## 2023-06-22 DIAGNOSIS — G47.33 OBSTRUCTIVE SLEEP APNEA (ADULT) (PEDIATRIC): ICD-10-CM

## 2023-06-22 DIAGNOSIS — E11.9 TYPE 2 DIABETES MELLITUS WITHOUT COMPLICATIONS: ICD-10-CM

## 2023-06-22 DIAGNOSIS — I10 ESSENTIAL (PRIMARY) HYPERTENSION: ICD-10-CM
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
## 2023-06-22 DIAGNOSIS — Z87.891 PERSONAL HISTORY OF NICOTINE DEPENDENCE: ICD-10-CM

## 2023-06-22 DIAGNOSIS — M25.512 PAIN IN LEFT SHOULDER: ICD-10-CM

## 2023-06-22 DIAGNOSIS — K51.90 ULCERATIVE COLITIS, UNSPECIFIED, WITHOUT COMPLICATIONS: ICD-10-CM

## 2023-06-22 DIAGNOSIS — Z90.49 ACQUIRED ABSENCE OF OTHER SPECIFIED PARTS OF DIGESTIVE TRACT: Chronic | ICD-10-CM

## 2023-06-22 DIAGNOSIS — E78.5 HYPERLIPIDEMIA, UNSPECIFIED: ICD-10-CM

## 2023-06-22 DIAGNOSIS — I48.0 PAROXYSMAL ATRIAL FIBRILLATION: ICD-10-CM

## 2023-06-22 PROCEDURE — 99285 EMERGENCY DEPT VISIT HI MDM: CPT | Mod: 57

## 2023-06-22 PROCEDURE — 23600 CLTX PROX HUMRL FX W/O MNPJ: CPT | Mod: 54,LT

## 2023-06-22 RX ORDER — ACETAMINOPHEN 500 MG
1000 TABLET ORAL ONCE
Refills: 0 | Status: COMPLETED | OUTPATIENT
Start: 2023-06-22 | End: 2023-06-22

## 2023-06-22 NOTE — ED ADULT TRIAGE NOTE - CHIEF COMPLAINT QUOTE
pt coming in from sound of music play with friends when she went to lean on the railing, and her coat made her slip and fall from standing onto left shoulder x 20 mins ago. Pt only c/o left shoulder pain. - head strike, witnessed, on Eliquis. HX of HLD, HTN. On 5-6L NC at baseline. on home O2 in triage. Pt A&ox4, ambulatory. GCS 15

## 2023-06-23 ENCOUNTER — INPATIENT (INPATIENT)
Facility: HOSPITAL | Age: 83
LOS: 3 days | Discharge: HOME HEALTH | DRG: 871 | End: 2023-06-27
Attending: HOSPITALIST | Admitting: INTERNAL MEDICINE
Payer: MEDICARE

## 2023-06-23 VITALS
HEART RATE: 80 BPM | OXYGEN SATURATION: 95 % | TEMPERATURE: 98 F | SYSTOLIC BLOOD PRESSURE: 130 MMHG | DIASTOLIC BLOOD PRESSURE: 68 MMHG | RESPIRATION RATE: 18 BRPM

## 2023-06-23 VITALS
HEART RATE: 103 BPM | HEIGHT: 67 IN | WEIGHT: 197.98 LBS | TEMPERATURE: 100 F | SYSTOLIC BLOOD PRESSURE: 121 MMHG | DIASTOLIC BLOOD PRESSURE: 48 MMHG | OXYGEN SATURATION: 81 % | RESPIRATION RATE: 30 BRPM

## 2023-06-23 DIAGNOSIS — Z90.49 ACQUIRED ABSENCE OF OTHER SPECIFIED PARTS OF DIGESTIVE TRACT: Chronic | ICD-10-CM

## 2023-06-23 LAB
ADD ON TEST-SPECIMEN IN LAB: SIGNIFICANT CHANGE UP
ALBUMIN SERPL ELPH-MCNC: 3.3 G/DL — SIGNIFICANT CHANGE UP (ref 3.3–5)
ALP SERPL-CCNC: 81 U/L — SIGNIFICANT CHANGE UP (ref 40–120)
ALT FLD-CCNC: 21 U/L — SIGNIFICANT CHANGE UP (ref 12–78)
ANION GAP SERPL CALC-SCNC: 5 MMOL/L — SIGNIFICANT CHANGE UP (ref 5–17)
ANISOCYTOSIS BLD QL: SIGNIFICANT CHANGE UP
APPEARANCE UR: CLEAR — SIGNIFICANT CHANGE UP
APTT BLD: 33 SEC — SIGNIFICANT CHANGE UP (ref 27.5–35.5)
AST SERPL-CCNC: 16 U/L — SIGNIFICANT CHANGE UP (ref 15–37)
BASE EXCESS BLDV CALC-SCNC: -0.4 MMOL/L — SIGNIFICANT CHANGE UP (ref -2–3)
BASOPHILS # BLD AUTO: 0.06 K/UL — SIGNIFICANT CHANGE UP (ref 0–0.2)
BASOPHILS NFR BLD AUTO: 0.3 % — SIGNIFICANT CHANGE UP (ref 0–2)
BILIRUB SERPL-MCNC: 0.5 MG/DL — SIGNIFICANT CHANGE UP (ref 0.2–1.2)
BILIRUB UR-MCNC: NEGATIVE — SIGNIFICANT CHANGE UP
BLOOD GAS COMMENTS, VENOUS: SIGNIFICANT CHANGE UP
BUN SERPL-MCNC: 25 MG/DL — HIGH (ref 7–23)
CALCIUM SERPL-MCNC: 8.7 MG/DL — SIGNIFICANT CHANGE UP (ref 8.5–10.1)
CHLORIDE SERPL-SCNC: 102 MMOL/L — SIGNIFICANT CHANGE UP (ref 96–108)
CO2 SERPL-SCNC: 27 MMOL/L — SIGNIFICANT CHANGE UP (ref 22–31)
COLOR SPEC: YELLOW — SIGNIFICANT CHANGE UP
CREAT SERPL-MCNC: 1.67 MG/DL — HIGH (ref 0.5–1.3)
DIFF PNL FLD: NEGATIVE — SIGNIFICANT CHANGE UP
EGFR: 30 ML/MIN/1.73M2 — LOW
EOSINOPHIL # BLD AUTO: 0 K/UL — SIGNIFICANT CHANGE UP (ref 0–0.5)
EOSINOPHIL NFR BLD AUTO: 0 % — SIGNIFICANT CHANGE UP (ref 0–6)
GLUCOSE SERPL-MCNC: 189 MG/DL — HIGH (ref 70–99)
GLUCOSE UR QL: NEGATIVE — SIGNIFICANT CHANGE UP
HCO3 BLDV-SCNC: 25 MMOL/L — SIGNIFICANT CHANGE UP (ref 22–29)
HCT VFR BLD CALC: 29.4 % — LOW (ref 34.5–45)
HGB BLD-MCNC: 8.8 G/DL — LOW (ref 11.5–15.5)
HYPOCHROMIA BLD QL: SIGNIFICANT CHANGE UP
IMM GRANULOCYTES NFR BLD AUTO: 0.5 % — SIGNIFICANT CHANGE UP (ref 0–0.9)
INR BLD: 1.71 RATIO — HIGH (ref 0.88–1.16)
KETONES UR-MCNC: NEGATIVE — SIGNIFICANT CHANGE UP
LACTATE SERPL-SCNC: 3.3 MMOL/L — HIGH (ref 0.7–2)
LEUKOCYTE ESTERASE UR-ACNC: NEGATIVE — SIGNIFICANT CHANGE UP
LYMPHOCYTES # BLD AUTO: 0.67 K/UL — LOW (ref 1–3.3)
LYMPHOCYTES # BLD AUTO: 3.3 % — LOW (ref 13–44)
MACROCYTES BLD QL: SLIGHT — SIGNIFICANT CHANGE UP
MANUAL SMEAR VERIFICATION: SIGNIFICANT CHANGE UP
MCHC RBC-ENTMCNC: 22 PG — LOW (ref 27–34)
MCHC RBC-ENTMCNC: 29.9 GM/DL — LOW (ref 32–36)
MCV RBC AUTO: 73.5 FL — LOW (ref 80–100)
MICROCYTES BLD QL: SIGNIFICANT CHANGE UP
MONOCYTES # BLD AUTO: 1.09 K/UL — HIGH (ref 0–0.9)
MONOCYTES NFR BLD AUTO: 5.3 % — SIGNIFICANT CHANGE UP (ref 2–14)
NEUTROPHILS # BLD AUTO: 18.53 K/UL — HIGH (ref 1.8–7.4)
NEUTROPHILS NFR BLD AUTO: 90.6 % — HIGH (ref 43–77)
NITRITE UR-MCNC: NEGATIVE — SIGNIFICANT CHANGE UP
OVALOCYTES BLD QL SMEAR: SLIGHT — SIGNIFICANT CHANGE UP
PCO2 BLDV: 42 MMHG — SIGNIFICANT CHANGE UP (ref 39–42)
PH BLDV: 7.38 — SIGNIFICANT CHANGE UP (ref 7.32–7.43)
PH UR: 5 — SIGNIFICANT CHANGE UP (ref 5–8)
PLAT MORPH BLD: NORMAL — SIGNIFICANT CHANGE UP
PLATELET # BLD AUTO: 335 K/UL — SIGNIFICANT CHANGE UP (ref 150–400)
PO2 BLDV: 128 MMHG — HIGH (ref 25–45)
POIKILOCYTOSIS BLD QL AUTO: SLIGHT — SIGNIFICANT CHANGE UP
POLYCHROMASIA BLD QL SMEAR: SLIGHT — SIGNIFICANT CHANGE UP
POTASSIUM SERPL-MCNC: 4.6 MMOL/L — SIGNIFICANT CHANGE UP (ref 3.5–5.3)
POTASSIUM SERPL-SCNC: 4.6 MMOL/L — SIGNIFICANT CHANGE UP (ref 3.5–5.3)
PROT SERPL-MCNC: 7.1 GM/DL — SIGNIFICANT CHANGE UP (ref 6–8.3)
PROT UR-MCNC: NEGATIVE — SIGNIFICANT CHANGE UP
PROTHROM AB SERPL-ACNC: 19.9 SEC — HIGH (ref 10.5–13.4)
RAPID RVP RESULT: SIGNIFICANT CHANGE UP
RBC # BLD: 4 M/UL — SIGNIFICANT CHANGE UP (ref 3.8–5.2)
RBC # FLD: 19.6 % — HIGH (ref 10.3–14.5)
RBC BLD AUTO: ABNORMAL
ROULEAUX BLD QL SMEAR: PRESENT
SAO2 % BLDV: 100 % — HIGH (ref 67–88)
SARS-COV-2 RNA SPEC QL NAA+PROBE: SIGNIFICANT CHANGE UP
SODIUM SERPL-SCNC: 134 MMOL/L — LOW (ref 135–145)
SP GR SPEC: 1.01 — SIGNIFICANT CHANGE UP (ref 1.01–1.02)
STOMATOCYTES BLD QL SMEAR: SLIGHT — SIGNIFICANT CHANGE UP
TARGETS BLD QL SMEAR: SLIGHT — SIGNIFICANT CHANGE UP
TOXIC GRANULES BLD QL SMEAR: PRESENT — SIGNIFICANT CHANGE UP
TROPONIN I, HIGH SENSITIVITY RESULT: 17.34 NG/L — SIGNIFICANT CHANGE UP
UROBILINOGEN FLD QL: NEGATIVE — SIGNIFICANT CHANGE UP
WBC # BLD: 20.46 K/UL — HIGH (ref 3.8–10.5)
WBC # FLD AUTO: 20.46 K/UL — HIGH (ref 3.8–10.5)

## 2023-06-23 PROCEDURE — 71250 CT THORAX DX C-: CPT | Mod: 26,MA

## 2023-06-23 PROCEDURE — 73060 X-RAY EXAM OF HUMERUS: CPT | Mod: 26,LT

## 2023-06-23 PROCEDURE — 99285 EMERGENCY DEPT VISIT HI MDM: CPT

## 2023-06-23 PROCEDURE — 73030 X-RAY EXAM OF SHOULDER: CPT | Mod: 26,LT

## 2023-06-23 PROCEDURE — 71045 X-RAY EXAM CHEST 1 VIEW: CPT | Mod: 26

## 2023-06-23 PROCEDURE — 93010 ELECTROCARDIOGRAM REPORT: CPT

## 2023-06-23 PROCEDURE — 73080 X-RAY EXAM OF ELBOW: CPT | Mod: 26,LT

## 2023-06-23 RX ORDER — AZITHROMYCIN 500 MG/1
500 TABLET, FILM COATED ORAL ONCE
Refills: 0 | Status: COMPLETED | OUTPATIENT
Start: 2023-06-23 | End: 2023-06-23

## 2023-06-23 RX ORDER — IPRATROPIUM/ALBUTEROL SULFATE 18-103MCG
3 AEROSOL WITH ADAPTER (GRAM) INHALATION ONCE
Refills: 0 | Status: COMPLETED | OUTPATIENT
Start: 2023-06-23 | End: 2023-06-23

## 2023-06-23 RX ORDER — ACETAMINOPHEN 500 MG
1000 TABLET ORAL ONCE
Refills: 0 | Status: COMPLETED | OUTPATIENT
Start: 2023-06-23 | End: 2023-06-23

## 2023-06-23 RX ORDER — SODIUM CHLORIDE 9 MG/ML
2800 INJECTION INTRAMUSCULAR; INTRAVENOUS; SUBCUTANEOUS ONCE
Refills: 0 | Status: COMPLETED | OUTPATIENT
Start: 2023-06-23 | End: 2023-06-23

## 2023-06-23 RX ORDER — CEFTRIAXONE 500 MG/1
1000 INJECTION, POWDER, FOR SOLUTION INTRAMUSCULAR; INTRAVENOUS ONCE
Refills: 0 | Status: COMPLETED | OUTPATIENT
Start: 2023-06-23 | End: 2023-06-23

## 2023-06-23 RX ORDER — ONDANSETRON 8 MG/1
4 TABLET, FILM COATED ORAL ONCE
Refills: 0 | Status: COMPLETED | OUTPATIENT
Start: 2023-06-23 | End: 2023-06-23

## 2023-06-23 RX ORDER — MORPHINE SULFATE 50 MG/1
2 CAPSULE, EXTENDED RELEASE ORAL ONCE
Refills: 0 | Status: DISCONTINUED | OUTPATIENT
Start: 2023-06-23 | End: 2023-06-23

## 2023-06-23 RX ORDER — ONDANSETRON 8 MG/1
1 TABLET, FILM COATED ORAL
Qty: 9 | Refills: 0
Start: 2023-06-23 | End: 2023-06-25

## 2023-06-23 RX ORDER — ACETAMINOPHEN 500 MG
1000 TABLET ORAL ONCE
Refills: 0 | Status: DISCONTINUED | OUTPATIENT
Start: 2023-06-23 | End: 2023-06-23

## 2023-06-23 RX ORDER — DEXAMETHASONE 0.5 MG/5ML
6 ELIXIR ORAL ONCE
Refills: 0 | Status: COMPLETED | OUTPATIENT
Start: 2023-06-23 | End: 2023-06-23

## 2023-06-23 RX ORDER — OXYCODONE HYDROCHLORIDE 5 MG/1
0.5 TABLET ORAL
Qty: 6 | Refills: 0
Start: 2023-06-23 | End: 2023-06-25

## 2023-06-23 RX ADMIN — MORPHINE SULFATE 2 MILLIGRAM(S): 50 CAPSULE, EXTENDED RELEASE ORAL at 01:22

## 2023-06-23 RX ADMIN — Medication 3 MILLILITER(S): at 21:57

## 2023-06-23 RX ADMIN — CEFTRIAXONE 1000 MILLIGRAM(S): 500 INJECTION, POWDER, FOR SOLUTION INTRAMUSCULAR; INTRAVENOUS at 21:56

## 2023-06-23 RX ADMIN — AZITHROMYCIN 255 MILLIGRAM(S): 500 TABLET, FILM COATED ORAL at 21:57

## 2023-06-23 RX ADMIN — Medication 6 MILLIGRAM(S): at 21:37

## 2023-06-23 RX ADMIN — SODIUM CHLORIDE 2800 MILLILITER(S): 9 INJECTION INTRAMUSCULAR; INTRAVENOUS; SUBCUTANEOUS at 21:39

## 2023-06-23 RX ADMIN — Medication 1000 MILLIGRAM(S): at 00:12

## 2023-06-23 RX ADMIN — Medication 400 MILLIGRAM(S): at 21:40

## 2023-06-23 RX ADMIN — ONDANSETRON 4 MILLIGRAM(S): 8 TABLET, FILM COATED ORAL at 01:22

## 2023-06-23 NOTE — ED ADULT NURSE NOTE - CAS EDN DISCHARGE ASSESSMENT
Hpi Title: Evaluation of Skin Lesions How Severe Are Your Spot(S)?: mild Have Your Spot(S) Been Treated In The Past?: has not been treated Alert and oriented to person, place and time/Patient baseline mental status

## 2023-06-23 NOTE — ED PROVIDER NOTE - CLINICAL SUMMARY MEDICAL DECISION MAKING FREE TEXT BOX
Patient's history and physical is most consistent with humeral head or humeral neck fracture.  Plan: Pain control as needed, x-ray of the left shoulder, humerus, elbow, sling and follow-up with orthopedic hand surgery

## 2023-06-23 NOTE — ED PROVIDER NOTE - CARE PROVIDER_API CALL
Bobby Roth.  Orthopaedic Surgery  [address] [place] [address]  Phone: [phone]  Fax: [phone]  Follow Up Time: 4-6 Days

## 2023-06-23 NOTE — ED PROVIDER NOTE - NSFOLLOWUPINSTRUCTIONS_ED_ALL_ED_FT
How To Use a Sling       A sling is a type of hanging bandage that is worn around the neck to protect an injured arm, shoulder, or other body part. A sling may be needed to prevent movement of (to immobilize) the injured body part while it heals. Keeping the injured body part still can lessen pain and speed up healing. A health care provider may recommend using a sling:    To treat a broken arm or collarbone.  To treat a shoulder injury.  After surgery.    What are the risks?  In general, wearing a sling helps with safe healing. However, in some cases, wearing a sling the wrong way can:    Make the injury worse.  Cause stiffness or numbness.  Affect blood flow (circulation) in the arm and hand. This can cause tingling or numbness in the fingers or hands.    How to use a sling  Follow instructions from your health care provider about how and when to wear your sling. Your health care provider will show you or tell you:    How to put on the sling.  How to adjust the sling.  When and how often to wear the sling.  How to remove the sling.    The way that you need to use a sling depends on your injury. Unless your health care provider gives you different instructions, you should:    Wear the sling so that your elbow bends to the shape of a capital letter "L" (90 degrees, or a right angle).  Make sure the sling supports your wrist and your hand.  Adjust the sling if your fingers or hand start to tingle or feel numb.    Follow these instructions at home:  Try to avoid moving your arm.  Do not twist, lift, or move your arm in a way that could make your injury worse.  Do not lean on your arm while wearing a sling.  Do not lift anything with the hand or arm that is in the sling.    Contact a health care provider if:  You have bruising, swelling, or pain that gets worse.  You have pain that does not get better with medicine.  You have a fever.  Your sling is not supporting your arm properly.  Your sling gets damaged.    Get help right away if you:  Have numbness or tingling in your fingers.  Notice that your fingers turn blue or feel cold to the touch.  Cannot control bleeding from your injury.  Have shortness of breath.    Summary  A sling is a type of hanging bandage that is worn around the neck to protect an injured arm, shoulder, or other body part. A sling may be needed to prevent movement of (to immobilize) the injured body part while it heals.  The way that you use a sling depends on your injury. Carefully follow instructions from your health care provider.  A good general rule is to wear the sling so that your arms bends 90 degrees (at a right angle) at the elbow. That is like the shape of a capital letter "L."  Make sure you know which problems should cause you to contact your health care provider or get help right away.    ADDITIONAL NOTES AND INSTRUCTIONS    Please follow up with your Primary MD in 24-48 hr.  Seek immediate medical care for any new/worsening signs or symptoms.

## 2023-06-23 NOTE — ED PROVIDER NOTE - OBJECTIVE STATEMENT
82-year-old female with history of atrial fibrillation, hypertension, hyperlipidemia presents for evaluation of left shoulder pain status post mechanical fall while she was leaving her seat at a theater.  Patient states that she got out of her seat and was carrying her oxygen concentrator 1 and causing her to lose balance while she was holding onto the rail.  Patient states that she slid down landing on her left shoulder.  Patient denies any head injury or loss of consciousness.  Patient is complaining of pain primarily to the anterior aspect of her left proximal humerus.  Patient notes that she was dealing with some pain in her left scapula previously and was seen  a chiropractor.  Patient denies any other complaints at this time and has not taken any medication for pain prior to arrival.

## 2023-06-23 NOTE — ED ADULT NURSE NOTE - OBJECTIVE STATEMENT
82yof discharged yesterday s/p fall. Today she presents with SOB, reports desat to 60s at home on 6LNC. Denies cp, n/v/d, admits pain at left shoulder r/t injury sustained yesterday.

## 2023-06-23 NOTE — ED PROVIDER NOTE - OBJECTIVE STATEMENT
83 y/o female w/PMHx of HTN, HLD, DM, Paroxysmal Afib, and ANU presents to ED for SOB. Pt hx of COPD wears 6L O2 at baseline. Pt was discharged yesterday from  for left arm fracture, pt left arm in sling. pt denies chest pain, LOC, fever/chills. 83 y/o female w/PMHx of HTN, HLD, DM, Paroxysmal Afib, and ANU presents to ED for SOB. Pt hx of COPD wears 6L O2 at baseline. Pt was discharged yesterday from  for left arm fracture, pt left arm in sling. pt denies chest pain, LOC, fever/chills. Pt's O2 levels were at 60% this morning. Pt endorsing body chills. no positive sick contacts. Per pt's mother, pt wasn't hydrating much today.

## 2023-06-23 NOTE — ED PROVIDER NOTE - PATIENT PORTAL LINK FT
You can access the FollowMyHealth Patient Portal offered by [facility] by registering at the following website: [email or web address]. By joining [facility]’s FollowMyHealth portal, you will also be able to view your health information using other applications (apps) compatible with our system.

## 2023-06-23 NOTE — ED ADULT NURSE NOTE - IS THE PATIENT ABLE TO BE SCREENED?
Discussion/Summary  Advocate Discussion Summary Free Text: Results given...instructed repeat in 6 months Dec. 2017 ERNESTINE Zamora CMA      Verified Results  MA US BREAST SCREEN EDMUND 23Jun2017 02:14PM WILLA CALVO   Ordering Provider: WILLA CALVO.    Reason For Study: dense breast tissue,dense breast tissue.   [Jun 27, 2017 7:08AM WILLA CALVO]  you have some cysts in the left breast - check US in 6 mos     Test Name Result Flag Reference   MA US BREAST SCREEN EDMUND (Report)     Accession #    [id number]    #[id number] - MA US BREAST SCREEN EDMUND    SCREENING ULTRASOUND OF BOTH BREASTS: 6/23/2017    CLINICAL HISTORY:58 year old female presents for dense breast tissue for Bilateral whole breast   screening ultrasound.    COMPARISON:   No prior exams were available for comparison.    FINDINGS:  Real-time ultrasound of both breasts was performed on the areas of interest.    There is a 3 mm oval lesion with a circumscribed margin in the left breast at 3 o'clock middle   depth.  Ultrasound all 4 quadrants both breasts as well as retroareolar areas and axilla.    IMPRESSION: PROBABLY BENIGN  The 3 mm oval lesion in the left breast most likely is a complicated cyst and is probably benign.  A follow-up left ultrasound in 6 months is recommended to demonstrate stability.    ULTRASOUND BI-RADS: 3 PROBABLY BENIGN    Gustavo junior/chaparro:6/23/2017 14:24:27    Imaging Technologist: Dania Aldridge [facility]  letter sent: Prob Benign  [id number]     **** FINAL ****    Dictated By:   NATTY, GUSTAVO GALVAN    Electronically Reviewed and Approved By:  NATTY, GUSTAVO GALVAN       Signatures   Electronically signed by : PATTI Raman; Jun 27 2017  2:11PM CST     Yes

## 2023-06-23 NOTE — ED PROVIDER NOTE - CLINICAL SUMMARY MEDICAL DECISION MAKING FREE TEXT BOX
Patient with COPD exacerbation, likely right sided pneumonia based on imaging.  Satting 88%-92% on ventimask, speaking full sentences, no distress.  Troponin WNL.  No signs of fluid overload.  Given duoneb, steroids, IV antibiotics, IVFs.  Admit to medicine service.

## 2023-06-23 NOTE — ED ADULT NURSE NOTE - CHIEF COMPLAINT QUOTE
Patient presents to ED for SOB. Pt hx of COPD wears 6L O2 at baseline. Pt was discharged yestserday from  for left arm fracture, pt left arm in sling. pt denies chest pain, LOC, fever/chills. EKg in process. O2 81% on 6L.

## 2023-06-23 NOTE — ED ADULT NURSE NOTE - NSFALLHARMRISKINTERV_ED_ALL_ED

## 2023-06-23 NOTE — ED PROVIDER NOTE - PHYSICAL EXAMINATION
Musculoskeletal exam: Tenderness to palpation of the left anterior humeral head with decreased range of motion of the left shoulder in all directions.  Tenderness to palpation of the shaft of the left humerus.  Full range of motion of the wrist and hand without tenderness to palpation

## 2023-06-24 DIAGNOSIS — J18.9 PNEUMONIA, UNSPECIFIED ORGANISM: ICD-10-CM

## 2023-06-24 LAB
ALBUMIN SERPL ELPH-MCNC: 3.2 G/DL — LOW (ref 3.3–5)
ALP SERPL-CCNC: 76 U/L — SIGNIFICANT CHANGE UP (ref 40–120)
ALT FLD-CCNC: 20 U/L — SIGNIFICANT CHANGE UP (ref 12–78)
ANION GAP SERPL CALC-SCNC: 6 MMOL/L — SIGNIFICANT CHANGE UP (ref 5–17)
AST SERPL-CCNC: 12 U/L — LOW (ref 15–37)
BASOPHILS # BLD AUTO: 0.02 K/UL — SIGNIFICANT CHANGE UP (ref 0–0.2)
BASOPHILS NFR BLD AUTO: 0.1 % — SIGNIFICANT CHANGE UP (ref 0–2)
BILIRUB SERPL-MCNC: 0.4 MG/DL — SIGNIFICANT CHANGE UP (ref 0.2–1.2)
BUN SERPL-MCNC: 22 MG/DL — SIGNIFICANT CHANGE UP (ref 7–23)
CALCIUM SERPL-MCNC: 8.5 MG/DL — SIGNIFICANT CHANGE UP (ref 8.5–10.1)
CHLORIDE SERPL-SCNC: 108 MMOL/L — SIGNIFICANT CHANGE UP (ref 96–108)
CO2 SERPL-SCNC: 22 MMOL/L — SIGNIFICANT CHANGE UP (ref 22–31)
CREAT SERPL-MCNC: 1.27 MG/DL — SIGNIFICANT CHANGE UP (ref 0.5–1.3)
EGFR: 42 ML/MIN/1.73M2 — LOW
EOSINOPHIL # BLD AUTO: 0 K/UL — SIGNIFICANT CHANGE UP (ref 0–0.5)
EOSINOPHIL NFR BLD AUTO: 0 % — SIGNIFICANT CHANGE UP (ref 0–6)
GLUCOSE BLDC GLUCOMTR-MCNC: 189 MG/DL — HIGH (ref 70–99)
GLUCOSE SERPL-MCNC: 241 MG/DL — HIGH (ref 70–99)
HCT VFR BLD CALC: 28.7 % — LOW (ref 34.5–45)
HGB BLD-MCNC: 8.5 G/DL — LOW (ref 11.5–15.5)
IMM GRANULOCYTES NFR BLD AUTO: 0.7 % — SIGNIFICANT CHANGE UP (ref 0–0.9)
LACTATE SERPL-SCNC: 2.3 MMOL/L — HIGH (ref 0.7–2)
LACTATE SERPL-SCNC: 4.7 MMOL/L — CRITICAL HIGH (ref 0.7–2)
LYMPHOCYTES # BLD AUTO: 0.29 K/UL — LOW (ref 1–3.3)
LYMPHOCYTES # BLD AUTO: 1.7 % — LOW (ref 13–44)
MCHC RBC-ENTMCNC: 22 PG — LOW (ref 27–34)
MCHC RBC-ENTMCNC: 29.6 GM/DL — LOW (ref 32–36)
MCV RBC AUTO: 74.4 FL — LOW (ref 80–100)
MONOCYTES # BLD AUTO: 0.13 K/UL — SIGNIFICANT CHANGE UP (ref 0–0.9)
MONOCYTES NFR BLD AUTO: 0.8 % — LOW (ref 2–14)
NEUTROPHILS # BLD AUTO: 16.04 K/UL — HIGH (ref 1.8–7.4)
NEUTROPHILS NFR BLD AUTO: 96.7 % — HIGH (ref 43–77)
NT-PROBNP SERPL-SCNC: 858 PG/ML — HIGH (ref 0–450)
PLATELET # BLD AUTO: 312 K/UL — SIGNIFICANT CHANGE UP (ref 150–400)
POTASSIUM SERPL-MCNC: 4.5 MMOL/L — SIGNIFICANT CHANGE UP (ref 3.5–5.3)
POTASSIUM SERPL-SCNC: 4.5 MMOL/L — SIGNIFICANT CHANGE UP (ref 3.5–5.3)
PROT SERPL-MCNC: 7.2 GM/DL — SIGNIFICANT CHANGE UP (ref 6–8.3)
RBC # BLD: 3.86 M/UL — SIGNIFICANT CHANGE UP (ref 3.8–5.2)
RBC # FLD: 19.6 % — HIGH (ref 10.3–14.5)
SODIUM SERPL-SCNC: 136 MMOL/L — SIGNIFICANT CHANGE UP (ref 135–145)
WBC # BLD: 16.6 K/UL — HIGH (ref 3.8–10.5)
WBC # FLD AUTO: 16.6 K/UL — HIGH (ref 3.8–10.5)

## 2023-06-24 PROCEDURE — 83550 IRON BINDING TEST: CPT

## 2023-06-24 PROCEDURE — 82728 ASSAY OF FERRITIN: CPT

## 2023-06-24 PROCEDURE — 97162 PT EVAL MOD COMPLEX 30 MIN: CPT | Mod: GP

## 2023-06-24 PROCEDURE — 85025 COMPLETE CBC W/AUTO DIFF WBC: CPT

## 2023-06-24 PROCEDURE — 99223 1ST HOSP IP/OBS HIGH 75: CPT | Mod: GC

## 2023-06-24 PROCEDURE — 82962 GLUCOSE BLOOD TEST: CPT

## 2023-06-24 PROCEDURE — 83605 ASSAY OF LACTIC ACID: CPT

## 2023-06-24 PROCEDURE — 80053 COMPREHEN METABOLIC PANEL: CPT

## 2023-06-24 PROCEDURE — 36415 COLL VENOUS BLD VENIPUNCTURE: CPT

## 2023-06-24 PROCEDURE — 97116 GAIT TRAINING THERAPY: CPT | Mod: GP

## 2023-06-24 PROCEDURE — 83540 ASSAY OF IRON: CPT

## 2023-06-24 PROCEDURE — 84466 ASSAY OF TRANSFERRIN: CPT

## 2023-06-24 PROCEDURE — 83036 HEMOGLOBIN GLYCOSYLATED A1C: CPT

## 2023-06-24 PROCEDURE — 94640 AIRWAY INHALATION TREATMENT: CPT

## 2023-06-24 RX ORDER — CHOLECALCIFEROL (VITAMIN D3) 125 MCG
1000 CAPSULE ORAL DAILY
Refills: 0 | Status: DISCONTINUED | OUTPATIENT
Start: 2023-06-24 | End: 2023-06-27

## 2023-06-24 RX ORDER — PANTOPRAZOLE SODIUM 20 MG/1
40 TABLET, DELAYED RELEASE ORAL
Refills: 0 | Status: DISCONTINUED | OUTPATIENT
Start: 2023-06-24 | End: 2023-06-27

## 2023-06-24 RX ORDER — METOPROLOL TARTRATE 50 MG
25 TABLET ORAL DAILY
Refills: 0 | Status: DISCONTINUED | OUTPATIENT
Start: 2023-06-24 | End: 2023-06-27

## 2023-06-24 RX ORDER — DEXTROSE 50 % IN WATER 50 %
25 SYRINGE (ML) INTRAVENOUS ONCE
Refills: 0 | Status: DISCONTINUED | OUTPATIENT
Start: 2023-06-24 | End: 2023-06-27

## 2023-06-24 RX ORDER — SIMVASTATIN 20 MG/1
20 TABLET, FILM COATED ORAL AT BEDTIME
Refills: 0 | Status: DISCONTINUED | OUTPATIENT
Start: 2023-06-24 | End: 2023-06-27

## 2023-06-24 RX ORDER — GLUCAGON INJECTION, SOLUTION 0.5 MG/.1ML
1 INJECTION, SOLUTION SUBCUTANEOUS ONCE
Refills: 0 | Status: DISCONTINUED | OUTPATIENT
Start: 2023-06-24 | End: 2023-06-27

## 2023-06-24 RX ORDER — ACETAMINOPHEN 500 MG
650 TABLET ORAL EVERY 6 HOURS
Refills: 0 | Status: DISCONTINUED | OUTPATIENT
Start: 2023-06-24 | End: 2023-06-27

## 2023-06-24 RX ORDER — DILTIAZEM HCL 120 MG
240 CAPSULE, EXT RELEASE 24 HR ORAL DAILY
Refills: 0 | Status: DISCONTINUED | OUTPATIENT
Start: 2023-06-24 | End: 2023-06-27

## 2023-06-24 RX ORDER — DEXTROSE 50 % IN WATER 50 %
12.5 SYRINGE (ML) INTRAVENOUS ONCE
Refills: 0 | Status: DISCONTINUED | OUTPATIENT
Start: 2023-06-24 | End: 2023-06-27

## 2023-06-24 RX ORDER — GABAPENTIN 400 MG/1
100 CAPSULE ORAL AT BEDTIME
Refills: 0 | Status: DISCONTINUED | OUTPATIENT
Start: 2023-06-24 | End: 2023-06-27

## 2023-06-24 RX ORDER — CEFTRIAXONE 500 MG/1
1000 INJECTION, POWDER, FOR SOLUTION INTRAMUSCULAR; INTRAVENOUS EVERY 24 HOURS
Refills: 0 | Status: DISCONTINUED | OUTPATIENT
Start: 2023-06-24 | End: 2023-06-27

## 2023-06-24 RX ORDER — SODIUM CHLORIDE 9 MG/ML
1000 INJECTION, SOLUTION INTRAVENOUS
Refills: 0 | Status: DISCONTINUED | OUTPATIENT
Start: 2023-06-24 | End: 2023-06-27

## 2023-06-24 RX ORDER — INSULIN LISPRO 100/ML
VIAL (ML) SUBCUTANEOUS AT BEDTIME
Refills: 0 | Status: DISCONTINUED | OUTPATIENT
Start: 2023-06-24 | End: 2023-06-27

## 2023-06-24 RX ORDER — MESALAMINE 400 MG
400 TABLET, DELAYED RELEASE (ENTERIC COATED) ORAL DAILY
Refills: 0 | Status: DISCONTINUED | OUTPATIENT
Start: 2023-06-24 | End: 2023-06-27

## 2023-06-24 RX ORDER — ALLOPURINOL 300 MG
300 TABLET ORAL DAILY
Refills: 0 | Status: DISCONTINUED | OUTPATIENT
Start: 2023-06-24 | End: 2023-06-27

## 2023-06-24 RX ORDER — BUDESONIDE AND FORMOTEROL FUMARATE DIHYDRATE 160; 4.5 UG/1; UG/1
2 AEROSOL RESPIRATORY (INHALATION)
Refills: 0 | Status: DISCONTINUED | OUTPATIENT
Start: 2023-06-24 | End: 2023-06-27

## 2023-06-24 RX ORDER — SODIUM CHLORIDE 9 MG/ML
1000 INJECTION INTRAMUSCULAR; INTRAVENOUS; SUBCUTANEOUS
Refills: 0 | Status: DISCONTINUED | OUTPATIENT
Start: 2023-06-24 | End: 2023-06-25

## 2023-06-24 RX ORDER — CALCIUM CARBONATE 500(1250)
1 TABLET ORAL
Refills: 0 | Status: DISCONTINUED | OUTPATIENT
Start: 2023-06-24 | End: 2023-06-27

## 2023-06-24 RX ORDER — LANOLIN ALCOHOL/MO/W.PET/CERES
3 CREAM (GRAM) TOPICAL AT BEDTIME
Refills: 0 | Status: DISCONTINUED | OUTPATIENT
Start: 2023-06-24 | End: 2023-06-27

## 2023-06-24 RX ORDER — INSULIN LISPRO 100/ML
VIAL (ML) SUBCUTANEOUS
Refills: 0 | Status: DISCONTINUED | OUTPATIENT
Start: 2023-06-24 | End: 2023-06-27

## 2023-06-24 RX ORDER — CEFTRIAXONE 500 MG/1
1000 INJECTION, POWDER, FOR SOLUTION INTRAMUSCULAR; INTRAVENOUS EVERY 24 HOURS
Refills: 0 | Status: DISCONTINUED | OUTPATIENT
Start: 2023-06-24 | End: 2023-06-24

## 2023-06-24 RX ORDER — DEXTROSE 50 % IN WATER 50 %
15 SYRINGE (ML) INTRAVENOUS ONCE
Refills: 0 | Status: DISCONTINUED | OUTPATIENT
Start: 2023-06-24 | End: 2023-06-27

## 2023-06-24 RX ORDER — AZITHROMYCIN 500 MG/1
500 TABLET, FILM COATED ORAL EVERY 24 HOURS
Refills: 0 | Status: DISCONTINUED | OUTPATIENT
Start: 2023-06-24 | End: 2023-06-27

## 2023-06-24 RX ORDER — APIXABAN 2.5 MG/1
2.5 TABLET, FILM COATED ORAL EVERY 12 HOURS
Refills: 0 | Status: DISCONTINUED | OUTPATIENT
Start: 2023-06-24 | End: 2023-06-26

## 2023-06-24 RX ORDER — ONDANSETRON 8 MG/1
4 TABLET, FILM COATED ORAL EVERY 8 HOURS
Refills: 0 | Status: DISCONTINUED | OUTPATIENT
Start: 2023-06-24 | End: 2023-06-27

## 2023-06-24 RX ADMIN — APIXABAN 2.5 MILLIGRAM(S): 2.5 TABLET, FILM COATED ORAL at 12:35

## 2023-06-24 RX ADMIN — APIXABAN 2.5 MILLIGRAM(S): 2.5 TABLET, FILM COATED ORAL at 21:26

## 2023-06-24 RX ADMIN — CEFTRIAXONE 1000 MILLIGRAM(S): 500 INJECTION, POWDER, FOR SOLUTION INTRAMUSCULAR; INTRAVENOUS at 21:15

## 2023-06-24 RX ADMIN — Medication 25 MILLIGRAM(S): at 12:36

## 2023-06-24 RX ADMIN — Medication 300 MILLIGRAM(S): at 12:36

## 2023-06-24 RX ADMIN — GABAPENTIN 100 MILLIGRAM(S): 400 CAPSULE ORAL at 21:25

## 2023-06-24 RX ADMIN — Medication 240 MILLIGRAM(S): at 19:00

## 2023-06-24 RX ADMIN — AZITHROMYCIN 255 MILLIGRAM(S): 500 TABLET, FILM COATED ORAL at 21:15

## 2023-06-24 RX ADMIN — PANTOPRAZOLE SODIUM 40 MILLIGRAM(S): 20 TABLET, DELAYED RELEASE ORAL at 12:35

## 2023-06-24 RX ADMIN — SODIUM CHLORIDE 75 MILLILITER(S): 9 INJECTION INTRAMUSCULAR; INTRAVENOUS; SUBCUTANEOUS at 02:37

## 2023-06-24 RX ADMIN — Medication 40 MILLIGRAM(S): at 21:25

## 2023-06-24 RX ADMIN — SIMVASTATIN 20 MILLIGRAM(S): 20 TABLET, FILM COATED ORAL at 21:25

## 2023-06-24 RX ADMIN — BUDESONIDE AND FORMOTEROL FUMARATE DIHYDRATE 2 PUFF(S): 160; 4.5 AEROSOL RESPIRATORY (INHALATION) at 20:58

## 2023-06-24 RX ADMIN — SODIUM CHLORIDE 75 MILLILITER(S): 9 INJECTION INTRAMUSCULAR; INTRAVENOUS; SUBCUTANEOUS at 19:00

## 2023-06-24 RX ADMIN — Medication 1 TABLET(S): at 21:27

## 2023-06-24 RX ADMIN — Medication 400 MILLIGRAM(S): at 12:36

## 2023-06-24 RX ADMIN — Medication 30 MILLILITER(S): at 23:52

## 2023-06-24 RX ADMIN — Medication 1000 UNIT(S): at 12:35

## 2023-06-24 NOTE — CONSULT NOTE ADULT - SUBJECTIVE AND OBJECTIVE BOX
HPI:    81 y/o female w/PMHx of HTN, HLD, DM, Paroxysmal Afib, ANU, COPD on 6L O2 at home, admitted for SOB. Of note, pt was at ED on  fo left arm fracture, and discharged with sling, but since that point, was c/o worsening SOB, no alleviating/aggravating sxs, associated with chills, possible fever, and some cough, causing her to present to . In the ED, pt was found to be febrile with Tmax at 102, w/HR at 103, RR at 30, and hypoxic as well, requiring venti mask on 15 L. Labs showed WBC at 20.46, H&H at 8.8/29.4. BUN/Cr at 25/1.67. Na at 134. Lactate was 3.3>4.7. CT chest showed bibasilar bronchial thickening and impaction of the airways R>L a/w mild associated bibasilar atelectasis, and scattered small subcentimeter groundglass nodules most prominent in the right upper lobe. Also stable 6 mm rt upper lobe nodule and b/l thyroid nodules, pat seen for pulmonary eval, pat desaturated on ambulation now on 100% NRB.      PAST MEDICAL & SURGICAL HISTORY:  HTN (hypertension)      HLD (hyperlipidemia)      DM (diabetes mellitus)      Paroxysmal A-fib  on Eliquis      ANU (obstructive sleep apnea)  on CPAP      H/O gastroesophageal reflux (GERD)      History of ulcerative colitis      S/P appendectomy          Home Medications:  allopurinol 300 mg oral tablet: 1 tab(s) orally once a day (15 Nov 2022 09:50)  Apriso 0.375 g oral capsule, extended release: 4 cap(s) orally once a day (15 Nov 2022 09:50)  Cartia  mg/24 hours oral capsule, extended release: 1 cap(s) orally once a day (15 Nov 2022 09:50)  CoQ10: orally once a day (15 Nov 2022 09:50)  dicyclomine 10 mg oral capsule: 1 cap(s) orally 3 times a day, As Needed (15 Nov 2022 09:50)  Eliquis 5 mg oral tablet: 1 tab(s) orally 2 times a day  Resume at 11pm tonight (20) (15 Nov 2022 09:50)  furosemide 20 mg oral tablet: 1 tab(s) orally once a day (15 Nov 2022 09:50)  gabapentin 100 mg oral tablet: 1 tab(s) orally once a day (at bedtime) (15 Nov 2022 09:50)  metFORMIN 500 mg oral tablet: 2 tab(s) orally once a day (in the morning) (15 Nov 2022 09:50)  metFORMIN 500 mg oral tablet: 1 tab(s) orally once (at bedtime) (15 Nov 2022 09:50)  Metoprolol Succinate ER 25 mg oral tablet, extended release: 1 tab(s) orally once a day (15 Nov 2022 09:50)  pantoprazole 40 mg oral delayed release tablet: 1 tab(s) orally once a day (15 Nov 2022 09:50)  Probiotic Formula oral capsule: 1 cap(s) orally 2 times a day (15 Nov 2022 09:50)  Prolia 60 mg/mL subcutaneous solution: subcutaneous every 6 months (15 Nov 2022 09:50)  simvastatin 20 mg oral tablet: 1 tab(s) orally once a day (at bedtime) (15 Nov 2022 09:50)  Trelegy Ellipta 100 mcg-62.5 mcg-25 mcg/inh inhalation powder: 1 puff(s) inhaled once a day (15 Nov 2022 09:50)  Vitamin D3 25 mcg (1000 intl units) oral tablet: 1 tab(s) orally once a day (15 Nov 2022 09:50)  zolpidem 5 mg oral tablet: 1 tab(s) orally once a day (at bedtime), As Needed (15 Nov 2022 09:50)      MEDICATIONS  (STANDING):  allopurinol 300 milliGRAM(s) Oral daily  apixaban 2.5 milliGRAM(s) Oral every 12 hours  azithromycin  IVPB 500 milliGRAM(s) IV Intermittent every 24 hours  budesonide  80 MICROgram(s)/formoterol 4.5 MICROgram(s) Inhaler 2 Puff(s) Inhalation two times a day  cefTRIAXone Injectable. 1000 milliGRAM(s) IV Push every 24 hours  cholecalciferol 1000 Unit(s) Oral daily  diltiazem    milliGRAM(s) Oral daily  gabapentin 100 milliGRAM(s) Oral at bedtime  mesalamine DR Capsule 400 milliGRAM(s) Oral daily  metoprolol succinate ER 25 milliGRAM(s) Oral daily  pantoprazole    Tablet 40 milliGRAM(s) Oral before breakfast  simvastatin 20 milliGRAM(s) Oral at bedtime  sodium chloride 0.9%. 1000 milliLiter(s) (75 mL/Hr) IV Continuous <Continuous>    MEDICATIONS  (PRN):  acetaminophen     Tablet .. 650 milliGRAM(s) Oral every 6 hours PRN Temp greater or equal to 38C (100.4F), Mild Pain (1 - 3)  dicyclomine 10 milliGRAM(s) Oral three times a day before meals PRN IBS  ondansetron   Disintegrating Tablet 4 milliGRAM(s) Oral every 8 hours PRN for nausea      Allergies    aspirin (Stomach Upset)    Intolerances        SOCIAL HISTORY: Denies tobacco, etoh abuse or illicit drug use    FAMILY HISTORY:  FH: HTN (hypertension)  mother, father    FH: stroke  mother    FH: CHF (congestive heart failure)  father    FH: cancer  brother        Vital Signs Last 24 Hrs  T(C): 36.4 (2023 08:15), Max: 38.9 (2023 21:11)  T(F): 97.5 (2023 08:15), Max: 102 (2023 21:11)  HR: 74 (2023 08:15) (74 - 103)  BP: 115/53 (2023 08:15) (107/52 - 130/57)  BP(mean): 67 (2023 02:30) (65 - 75)  RR: 19 (2023 08:15) (17 - 30)  SpO2: 92% (2023 08:15) (81% - 97%)    Parameters below as of 2023 08:15  Patient On (Oxygen Delivery Method): mask, Venturi  O2 Flow (L/min): 15          REVIEW OF SYSTEMS:    CONSTITUTIONAL:  As per HPI.  HEENT:  Eyes:  No diplopia or blurred vision. ENT:  No earache, sore throat or runny nose.  CARDIOVASCULAR:  No pressure, squeezing, tightness, heaviness or aching about the chest, neck, axilla or epigastrium.  RESPIRATORY:  No cough, +shortness of breath, PND or orthopnea.  GASTROINTESTINAL:  No nausea, vomiting or diarrhea.  GENITOURINARY:  No dysuria, frequency or urgency.  MUSCULOSKELETAL:  As per HPI.  SKIN:  No change in skin, hair or nails.  NEUROLOGIC:  No paresthesias, fasciculations, seizures or weakness.  PSYCHIATRIC:  No disorder of thought or mood.  ENDOCRINE:  No heat or cold intolerance, polyuria or polydipsia.  HEMATOLOGICAL:  No easy bruising or bleedings:  .     PHYSICAL EXAMINATION:    GENERAL APPEARANCE:  Pt. is not currently dyspneic, in no distress. Pt. is alert, oriented, and pleasant.  HEENT:  Pupils are normal and react normally. No icterus. Mucous membranes well colored.  NECK:  Supple. No lymphadenopathy. Jugular venous pressure not elevated. Carotids equal.   HEART:   The cardiac impulse has a normal quality. Regular. Normal S1 and S2. There are no murmurs, rubs or gallops noted  CHEST:  Chest crackles to auscultation. Normal respiratory effort.  ABDOMEN:  Soft and nontender.   EXTREMITIES:  There is no cyanosis, clubbing or edema.   SKIN:  No rash or significant lesions are noted.    LABS:                        8.5    16.60 )-----------( 312      ( 2023 11:32 )             28.7     06-23    134<L>  |  102  |  25<H>  ----------------------------<  189<H>  4.6   |  27  |  1.67<H>    Ca    8.7      2023 21:06    TPro  7.1  /  Alb  3.3  /  TBili  0.5  /  DBili  x   /  AST  16  /  ALT  21  /  AlkPhos  81  06-23    LIVER FUNCTIONS - ( 2023 21:06 )  Alb: 3.3 g/dL / Pro: 7.1 gm/dL / ALK PHOS: 81 U/L / ALT: 21 U/L / AST: 16 U/L / GGT: x           PT/INR - ( 2023 21:06 )   PT: 19.9 sec;   INR: 1.71 ratio         PTT - ( 2023 21:06 )  PTT:33.0 sec      Urinalysis Basic - ( 2023 21:51 )    Color: Yellow / Appearance: Clear / S.015 / pH: x  Gluc: x / Ketone: Negative  / Bili: Negative / Urobili: Negative   Blood: x / Protein: Negative / Nitrite: Negative   Leuk Esterase: Negative / RBC: x / WBC x   Sq Epi: x / Non Sq Epi: x / Bacteria: x            RADIOLOGY & ADDITIONAL STUDIES:     CT Chest No Cont (23 @ 23:47) >  IMPRESSION:    There is bibasilar bronchial thickening and impaction of the airways more   prominent on the right. There is mild associated bibasilar atelectasis.   There are scattered small subcentimeter groundglass nodules most   prominent in the right upper lobe. Findings may represent infection.   Correlate clinically. Recommend follow-up.    A discrete solid 6 mm right upper lobe nodule is unchanged since 15 2022.   Continued follow-up is recommended.    Bilateral thyroid nodules. Nonemergent thyroid ultrasound is recommended.

## 2023-06-24 NOTE — H&P ADULT - ASSESSMENT
Pt is a 83 y/o female w/PMHx of HTN, HLD, DM, Paroxysmal Afib, ANU, COPD on 6L O2 at home, presents for SOB. She is admitted for:    #Acute sepsis 2/2 CAP  #Acute Hypoxic Respiratory Failure 2/2 CAP  - Pt with sob, fever, chills, hypoxic in the ED requiring Venti mask, meeting SIRS criteria d/t elevated temps, HR, RR, a/w high lactate  - CT chest as noted above  - Supplemental O2, titrate down as tolerated  - C/w ceftriaxone + Azithromycin  - F/u cultures  - Pulse Ox  - Incentive spirometry  - Monitor temps, CBC, lactate    #REBECA  - Cr elevated on admission  - Gentle hydration, IV NS at 75 cc/hr  - Avoid nephrotoxic medicines  - Continue to trend Cr    #T2DM  - ISS  - Monitor glucose, A1C    #HTN, HLD, DM, Paroxysmal Afib, COPD  - Continue with home meds    #DVT ppx  - On Eliquis    D/w Dr. Morgan

## 2023-06-24 NOTE — CONSULT NOTE ADULT - ASSESSMENT
Pt is a 81 y/o female w/PMHx of HTN, HLD, DM, Paroxysmal Afib, ANU, COPD on 6L O2 at home, presents for SOB. She is admitted for:    PROBLEMS:    Acute sepsis 2/2 CAP  Acute Hypoxic Respiratory Failure 2/2 CAP/COPD & EMPHYSEMA  Acute excerbation of COPD  REBECA  T2DM  HTN/HLD/DM  Paroxysmal Afib,    PLAN:    Titrate fio2- Supplemental O2, titrate down as tolerated  IV ceftriaxone + Azithromycin  IV solu-medrols 40mg q12hr  F/u cultures  Pulse Ox  Incentive spirometry  REBECA- hydration, IV NS at 75 cc/hr  Avoid nephrotoxic medicines  Trend Cr  [id number]  DVT ppx-On Eliquis

## 2023-06-24 NOTE — H&P ADULT - NSHPPHYSICALEXAM_GEN_ALL_CORE
Vital Signs Last 24 Hrs  T(C): 36.7 (24 Jun 2023 02:59), Max: 38.9 (23 Jun 2023 21:11)  T(F): 98 (24 Jun 2023 02:59), Max: 102 (23 Jun 2023 21:11)  HR: 86 (24 Jun 2023 02:59) (86 - 103)  BP: 124/55 (24 Jun 2023 02:59) (107/52 - 130/57)  BP(mean): 67 (24 Jun 2023 02:30) (65 - 75)  RR: 20 (24 Jun 2023 02:59) (17 - 30)  SpO2: 94% (24 Jun 2023 02:59) (81% - 97%)  Parameters below as of 24 Jun 2023 02:59  Patient On (Oxygen Delivery Method): mask, Venturi  O2 Flow (L/min): 15  O2 Concentration (%): 50    Vital Signs Last 24 Hrs  T(C): 36.7 (24 Jun 2023 02:59), Max: 38.9 (23 Jun 2023 21:11)  T(F): 98 (24 Jun 2023 02:59), Max: 102 (23 Jun 2023 21:11)  HR: 86 (24 Jun 2023 02:59) (86 - 103)  BP: 124/55 (24 Jun 2023 02:59) (107/52 - 130/57)  BP(mean): 67 (24 Jun 2023 02:30) (65 - 75)  RR: 20 (24 Jun 2023 02:59) (17 - 30)  SpO2: 94% (24 Jun 2023 02:59) (81% - 97%)    Parameters below as of 24 Jun 2023 02:59  Patient On (Oxygen Delivery Method): mask, Venturi  O2 Flow (L/min): 15  O2 Concentration (%): 50    PHYSICAL EXAM:  GENERAL: NAD, lying in bed comfortably  HEAD:  Atraumatic, Normocephalic  EYES: EOMI, PERRLA, conjunctiva and sclera clear  ENT: Moist mucous membranes  NECK: Supple, No JVD  CHEST/LUNG: Clear to auscultation bilaterally; slightly decreased breath sounds, No rales, rhonchi, wheezing, or rubs. Unlabored respirations  HEART: Regular rate and rhythm; No murmurs, rubs, or gallops  ABDOMEN: Bowel sounds present; Soft, Nontender, Nondistended.   EXTREMITIES:  No clubbing, cyanosis, or edema  NERVOUS SYSTEM:  Alert & Oriented X3, speech clear. No new deficits   MSK: Normal muscle tone, no atrophy, no rigidity, no contractions  SKIN: No new rashes or lesions

## 2023-06-24 NOTE — PATIENT PROFILE ADULT - FALL HARM RISK - HARM RISK INTERVENTIONS

## 2023-06-24 NOTE — H&P ADULT - ATTENDING COMMENTS
81 y/o female w/PMHx of HTN, HLD, DM, Paroxysmal Afib, ANU, COPD on 6L O2 at home, presents for SOB. She is admitted for:    #Acute sepsis 2/2 CAP  #Acute Hypoxic Respiratory Failure 2/2 CAP  - Pt with sob, fever, chills, hypoxic in the ED requiring Venti mask, meeting SIRS criteria d/t elevated temps, HR, RR, a/w high lactate  - CT chest as noted above  - Supplemental O2, titrate down as tolerated  - C/w ceftriaxone + Azithromycin  - F/u cultures  - Pulse Ox  - Incentive spirometry  - Monitor temps, CBC, lactate

## 2023-06-25 LAB
A1C WITH ESTIMATED AVERAGE GLUCOSE RESULT: 6.4 % — HIGH (ref 4–5.6)
ALBUMIN SERPL ELPH-MCNC: 3.2 G/DL — LOW (ref 3.3–5)
ALP SERPL-CCNC: 81 U/L — SIGNIFICANT CHANGE UP (ref 40–120)
ALT FLD-CCNC: 23 U/L — SIGNIFICANT CHANGE UP (ref 12–78)
ANION GAP SERPL CALC-SCNC: 5 MMOL/L — SIGNIFICANT CHANGE UP (ref 5–17)
AST SERPL-CCNC: 14 U/L — LOW (ref 15–37)
BASOPHILS # BLD AUTO: 0.02 K/UL — SIGNIFICANT CHANGE UP (ref 0–0.2)
BASOPHILS NFR BLD AUTO: 0.1 % — SIGNIFICANT CHANGE UP (ref 0–2)
BILIRUB SERPL-MCNC: 0.3 MG/DL — SIGNIFICANT CHANGE UP (ref 0.2–1.2)
BUN SERPL-MCNC: 21 MG/DL — SIGNIFICANT CHANGE UP (ref 7–23)
CALCIUM SERPL-MCNC: 8.9 MG/DL — SIGNIFICANT CHANGE UP (ref 8.5–10.1)
CHLORIDE SERPL-SCNC: 106 MMOL/L — SIGNIFICANT CHANGE UP (ref 96–108)
CO2 SERPL-SCNC: 23 MMOL/L — SIGNIFICANT CHANGE UP (ref 22–31)
CREAT SERPL-MCNC: 1.11 MG/DL — SIGNIFICANT CHANGE UP (ref 0.5–1.3)
CULTURE RESULTS: NO GROWTH — SIGNIFICANT CHANGE UP
EGFR: 49 ML/MIN/1.73M2 — LOW
EOSINOPHIL # BLD AUTO: 0 K/UL — SIGNIFICANT CHANGE UP (ref 0–0.5)
EOSINOPHIL NFR BLD AUTO: 0 % — SIGNIFICANT CHANGE UP (ref 0–6)
ESTIMATED AVERAGE GLUCOSE: 137 MG/DL — HIGH (ref 68–114)
FERRITIN SERPL-MCNC: 21 NG/ML — SIGNIFICANT CHANGE UP (ref 15–150)
GLUCOSE BLDC GLUCOMTR-MCNC: 191 MG/DL — HIGH (ref 70–99)
GLUCOSE BLDC GLUCOMTR-MCNC: 233 MG/DL — HIGH (ref 70–99)
GLUCOSE BLDC GLUCOMTR-MCNC: 265 MG/DL — HIGH (ref 70–99)
GLUCOSE BLDC GLUCOMTR-MCNC: 266 MG/DL — HIGH (ref 70–99)
GLUCOSE SERPL-MCNC: 286 MG/DL — HIGH (ref 70–99)
HCT VFR BLD CALC: 29.4 % — LOW (ref 34.5–45)
HGB BLD-MCNC: 8.6 G/DL — LOW (ref 11.5–15.5)
IMM GRANULOCYTES NFR BLD AUTO: 0.9 % — SIGNIFICANT CHANGE UP (ref 0–0.9)
IRON SATN MFR SERPL: 12 UG/DL — LOW (ref 30–160)
IRON SATN MFR SERPL: 4 % — LOW (ref 14–50)
LYMPHOCYTES # BLD AUTO: 0.31 K/UL — LOW (ref 1–3.3)
LYMPHOCYTES # BLD AUTO: 1.4 % — LOW (ref 13–44)
MCHC RBC-ENTMCNC: 22 PG — LOW (ref 27–34)
MCHC RBC-ENTMCNC: 29.3 GM/DL — LOW (ref 32–36)
MCV RBC AUTO: 75.2 FL — LOW (ref 80–100)
MONOCYTES # BLD AUTO: 0.3 K/UL — SIGNIFICANT CHANGE UP (ref 0–0.9)
MONOCYTES NFR BLD AUTO: 1.4 % — LOW (ref 2–14)
NEUTROPHILS # BLD AUTO: 21.08 K/UL — HIGH (ref 1.8–7.4)
NEUTROPHILS NFR BLD AUTO: 96.2 % — HIGH (ref 43–77)
PLATELET # BLD AUTO: 291 K/UL — SIGNIFICANT CHANGE UP (ref 150–400)
POTASSIUM SERPL-MCNC: 4.7 MMOL/L — SIGNIFICANT CHANGE UP (ref 3.5–5.3)
POTASSIUM SERPL-SCNC: 4.7 MMOL/L — SIGNIFICANT CHANGE UP (ref 3.5–5.3)
PROT SERPL-MCNC: 7.2 GM/DL — SIGNIFICANT CHANGE UP (ref 6–8.3)
RBC # BLD: 3.91 M/UL — SIGNIFICANT CHANGE UP (ref 3.8–5.2)
RBC # FLD: 19.9 % — HIGH (ref 10.3–14.5)
SODIUM SERPL-SCNC: 134 MMOL/L — LOW (ref 135–145)
SPECIMEN SOURCE: SIGNIFICANT CHANGE UP
TIBC SERPL-MCNC: 342 UG/DL — SIGNIFICANT CHANGE UP (ref 220–430)
TRANSFERRIN SERPL-MCNC: 284 MG/DL — SIGNIFICANT CHANGE UP (ref 200–360)
UIBC SERPL-MCNC: 329 UG/DL — SIGNIFICANT CHANGE UP (ref 110–370)
WBC # BLD: 21.9 K/UL — HIGH (ref 3.8–10.5)
WBC # FLD AUTO: 21.9 K/UL — HIGH (ref 3.8–10.5)

## 2023-06-25 PROCEDURE — 99233 SBSQ HOSP IP/OBS HIGH 50: CPT | Mod: GC

## 2023-06-25 RX ORDER — FUROSEMIDE 40 MG
1 TABLET ORAL
Qty: 0 | Refills: 0 | DISCHARGE

## 2023-06-25 RX ORDER — GABAPENTIN 400 MG/1
1 CAPSULE ORAL
Qty: 0 | Refills: 0 | DISCHARGE

## 2023-06-25 RX ORDER — PANTOPRAZOLE SODIUM 20 MG/1
1 TABLET, DELAYED RELEASE ORAL
Qty: 0 | Refills: 0 | DISCHARGE

## 2023-06-25 RX ORDER — FAMOTIDINE 10 MG/ML
20 INJECTION INTRAVENOUS DAILY
Refills: 0 | Status: DISCONTINUED | OUTPATIENT
Start: 2023-06-25 | End: 2023-06-27

## 2023-06-25 RX ORDER — ZOLPIDEM TARTRATE 10 MG/1
5 TABLET ORAL AT BEDTIME
Refills: 0 | Status: DISCONTINUED | OUTPATIENT
Start: 2023-06-25 | End: 2023-06-27

## 2023-06-25 RX ADMIN — Medication 3: at 12:18

## 2023-06-25 RX ADMIN — APIXABAN 2.5 MILLIGRAM(S): 2.5 TABLET, FILM COATED ORAL at 08:57

## 2023-06-25 RX ADMIN — Medication 1000 UNIT(S): at 08:58

## 2023-06-25 RX ADMIN — BUDESONIDE AND FORMOTEROL FUMARATE DIHYDRATE 2 PUFF(S): 160; 4.5 AEROSOL RESPIRATORY (INHALATION) at 20:18

## 2023-06-25 RX ADMIN — APIXABAN 2.5 MILLIGRAM(S): 2.5 TABLET, FILM COATED ORAL at 21:50

## 2023-06-25 RX ADMIN — Medication 300 MILLIGRAM(S): at 08:57

## 2023-06-25 RX ADMIN — Medication 1: at 08:59

## 2023-06-25 RX ADMIN — ZOLPIDEM TARTRATE 5 MILLIGRAM(S): 10 TABLET ORAL at 21:50

## 2023-06-25 RX ADMIN — Medication 40 MILLIGRAM(S): at 08:54

## 2023-06-25 RX ADMIN — Medication 3 MILLIGRAM(S): at 00:04

## 2023-06-25 RX ADMIN — PANTOPRAZOLE SODIUM 40 MILLIGRAM(S): 20 TABLET, DELAYED RELEASE ORAL at 08:57

## 2023-06-25 RX ADMIN — GABAPENTIN 100 MILLIGRAM(S): 400 CAPSULE ORAL at 21:49

## 2023-06-25 RX ADMIN — Medication 1: at 22:33

## 2023-06-25 RX ADMIN — Medication 30 MILLILITER(S): at 21:49

## 2023-06-25 RX ADMIN — Medication 400 MILLIGRAM(S): at 12:18

## 2023-06-25 RX ADMIN — SIMVASTATIN 20 MILLIGRAM(S): 20 TABLET, FILM COATED ORAL at 21:49

## 2023-06-25 RX ADMIN — Medication 2: at 16:29

## 2023-06-25 RX ADMIN — CEFTRIAXONE 1000 MILLIGRAM(S): 500 INJECTION, POWDER, FOR SOLUTION INTRAMUSCULAR; INTRAVENOUS at 21:49

## 2023-06-25 RX ADMIN — BUDESONIDE AND FORMOTEROL FUMARATE DIHYDRATE 2 PUFF(S): 160; 4.5 AEROSOL RESPIRATORY (INHALATION) at 08:34

## 2023-06-25 RX ADMIN — AZITHROMYCIN 255 MILLIGRAM(S): 500 TABLET, FILM COATED ORAL at 21:47

## 2023-06-25 NOTE — PROGRESS NOTE ADULT - ASSESSMENT
Pt is a 81 y/o female w/PMHx of HTN, HLD, DM, Paroxysmal Afib, ANU, COPD on 6L O2 at home, presents for SOB. She is admitted for:    #Acute sepsis 2/2 CAP, improving   #Acute Hypoxic Respiratory Failure 2/2 CAP  - Pt with sob, fever, chills, hypoxic in the ED requiring Venti mask, meeting SIRS criteria d/t elevated temps, HR, RR, a/w high lactate  - CT chest as noted above  - Supplemental O2, titrate down as tolerated  - C/w ceftriaxone + Azithromycin  - F/u cultures   - Pulse Ox  - Incentive spirometry  - Monitor temps, CBC, lactat  - have melatonin, ambien prn for sleep      #REBECA, resolved   - Cr elevated on admission  - Gentle hydration, IV NS at 75 cc/hr  - Avoid nephrotoxic medicines  - Continue to trend Cr    #T2DM  - ISS  - Monitor glucose, A1C    #HTN, HLD, DM, Paroxysmal Afib, COPD  - Continue with home meds    #DVT ppx  - On Eliquis    D/w Dr. Morgan     Pt is a 83 y/o female w/PMHx of HTN, HLD, DM, Paroxysmal Afib, ANU, COPD on 6L O2 at home, presents for SOB. She is admitted for:    #Acute sepsis 2/2 CAP, improving   #Acute Hypoxic Respiratory Failure 2/2 CAP  - Pt with sob, fever, chills, hypoxic in the ED requiring Venti mask, meeting SIRS criteria d/t elevated temps, HR, RR, a/w high lactate  - CT chest as noted above  - Supplemental O2, titrate down as tolerated  - C/w ceftriaxone + Azithromycin  - F/u cultures   - Pulse Ox  - Incentive spirometry  - Monitor temps, CBC, lactate  - have melatonin, ambien prn for sleep  - changed from IV solumedrol to oral prednisone 40mg Q24    #REBECA, resolved   - Cr elevated on admission  - Gentle hydration, IV NS at 75 cc/hr  - Avoid nephrotoxic medicines  - Continue to trend Cr    #T2DM  - ISS  - Monitor glucose, A1C    #HTN, HLD, DM, Paroxysmal Afib, COPD  - Continue with home meds    #DVT ppx  - On Eliquis    D/w Dr. Morgan

## 2023-06-25 NOTE — PROGRESS NOTE ADULT - ATTENDING COMMENTS
81 y/o female w/PMHx of HTN, HLD, DM, Paroxysmal Afib, ANU, COPD on 6L O2 at home, presents for SOB. She is admitted for:    #Acute sepsis 2/2 CAP, improving   #Acute Hypoxic Respiratory Failure 2/2 CAP  - Pt with sob, fever, chills, hypoxic in the ED requiring Venti mask, meeting SIRS criteria d/t elevated temps, HR, RR, a/w high lactate  - CT chest as noted above  - Supplemental O2, titrate down as tolerated  - C/w ceftriaxone + Azithromycin  - F/u cultures   - Pulse Ox  - Incentive spirometry  - Monitor temps, CBC, lactat  - have melatonin, ambien prn for sleep

## 2023-06-25 NOTE — PROGRESS NOTE ADULT - ASSESSMENT
Pt is a 81 y/o female w/PMHx of HTN, HLD, DM, Paroxysmal Afib, ANU, COPD on 6L O2 at home, presents for SOB. She is admitted for:    PROBLEMS:    Acute sepsis 2/2 CAP  Acute Hypoxic Respiratory Failure 2/2 CAP/COPD & EMPHYSEMA  Acute excerbation of COPD  REBECA  T2DM  HTN/HLD/DM  Paroxysmal Afib,    PLAN:    Titrate fio2- Supplemental O2, titrate down as tolerated  IV ceftriaxone + Azithromycin  Po prednisone 40mg qdaily  F/u cultures  Pulse Ox  Incentive spirometry  REBECA- hydration, IV NS at 75 cc/hr  Avoid nephrotoxic medicines  Trend Cr  [id number]  DVT ppx-On Eliquis

## 2023-06-26 LAB
ALBUMIN SERPL ELPH-MCNC: 2.8 G/DL — LOW (ref 3.3–5)
ALP SERPL-CCNC: 69 U/L — SIGNIFICANT CHANGE UP (ref 40–120)
ALT FLD-CCNC: 19 U/L — SIGNIFICANT CHANGE UP (ref 12–78)
ANION GAP SERPL CALC-SCNC: 3 MMOL/L — LOW (ref 5–17)
AST SERPL-CCNC: 10 U/L — LOW (ref 15–37)
BASOPHILS # BLD AUTO: 0.01 K/UL — SIGNIFICANT CHANGE UP (ref 0–0.2)
BASOPHILS NFR BLD AUTO: 0.1 % — SIGNIFICANT CHANGE UP (ref 0–2)
BILIRUB SERPL-MCNC: 0.2 MG/DL — SIGNIFICANT CHANGE UP (ref 0.2–1.2)
BUN SERPL-MCNC: 24 MG/DL — HIGH (ref 7–23)
CALCIUM SERPL-MCNC: 8.8 MG/DL — SIGNIFICANT CHANGE UP (ref 8.5–10.1)
CHLORIDE SERPL-SCNC: 110 MMOL/L — HIGH (ref 96–108)
CO2 SERPL-SCNC: 27 MMOL/L — SIGNIFICANT CHANGE UP (ref 22–31)
CREAT SERPL-MCNC: 0.97 MG/DL — SIGNIFICANT CHANGE UP (ref 0.5–1.3)
EGFR: 58 ML/MIN/1.73M2 — LOW
EOSINOPHIL # BLD AUTO: 0 K/UL — SIGNIFICANT CHANGE UP (ref 0–0.5)
EOSINOPHIL NFR BLD AUTO: 0 % — SIGNIFICANT CHANGE UP (ref 0–6)
GLUCOSE BLDC GLUCOMTR-MCNC: 164 MG/DL — HIGH (ref 70–99)
GLUCOSE BLDC GLUCOMTR-MCNC: 190 MG/DL — HIGH (ref 70–99)
GLUCOSE BLDC GLUCOMTR-MCNC: 206 MG/DL — HIGH (ref 70–99)
GLUCOSE BLDC GLUCOMTR-MCNC: 248 MG/DL — HIGH (ref 70–99)
GLUCOSE SERPL-MCNC: 162 MG/DL — HIGH (ref 70–99)
HCT VFR BLD CALC: 25.8 % — LOW (ref 34.5–45)
HGB BLD-MCNC: 7.8 G/DL — LOW (ref 11.5–15.5)
IMM GRANULOCYTES NFR BLD AUTO: 1 % — HIGH (ref 0–0.9)
LYMPHOCYTES # BLD AUTO: 0.38 K/UL — LOW (ref 1–3.3)
LYMPHOCYTES # BLD AUTO: 2.6 % — LOW (ref 13–44)
MCHC RBC-ENTMCNC: 22.3 PG — LOW (ref 27–34)
MCHC RBC-ENTMCNC: 30.2 GM/DL — LOW (ref 32–36)
MCV RBC AUTO: 73.7 FL — LOW (ref 80–100)
MONOCYTES # BLD AUTO: 0.4 K/UL — SIGNIFICANT CHANGE UP (ref 0–0.9)
MONOCYTES NFR BLD AUTO: 2.8 % — SIGNIFICANT CHANGE UP (ref 2–14)
NEUTROPHILS # BLD AUTO: 13.46 K/UL — HIGH (ref 1.8–7.4)
NEUTROPHILS NFR BLD AUTO: 93.5 % — HIGH (ref 43–77)
PLATELET # BLD AUTO: 328 K/UL — SIGNIFICANT CHANGE UP (ref 150–400)
POTASSIUM SERPL-MCNC: 4.7 MMOL/L — SIGNIFICANT CHANGE UP (ref 3.5–5.3)
POTASSIUM SERPL-SCNC: 4.7 MMOL/L — SIGNIFICANT CHANGE UP (ref 3.5–5.3)
PROT SERPL-MCNC: 6.5 GM/DL — SIGNIFICANT CHANGE UP (ref 6–8.3)
RBC # BLD: 3.5 M/UL — LOW (ref 3.8–5.2)
RBC # FLD: 19.4 % — HIGH (ref 10.3–14.5)
SODIUM SERPL-SCNC: 140 MMOL/L — SIGNIFICANT CHANGE UP (ref 135–145)
WBC # BLD: 14.39 K/UL — HIGH (ref 3.8–10.5)
WBC # FLD AUTO: 14.39 K/UL — HIGH (ref 3.8–10.5)

## 2023-06-26 PROCEDURE — 99233 SBSQ HOSP IP/OBS HIGH 50: CPT | Mod: GC

## 2023-06-26 RX ORDER — POLYETHYLENE GLYCOL 3350 17 G/17G
17 POWDER, FOR SOLUTION ORAL DAILY
Refills: 0 | Status: DISCONTINUED | OUTPATIENT
Start: 2023-06-26 | End: 2023-06-27

## 2023-06-26 RX ORDER — APIXABAN 2.5 MG/1
5 TABLET, FILM COATED ORAL EVERY 12 HOURS
Refills: 0 | Status: DISCONTINUED | OUTPATIENT
Start: 2023-06-26 | End: 2023-06-27

## 2023-06-26 RX ADMIN — FAMOTIDINE 20 MILLIGRAM(S): 10 INJECTION INTRAVENOUS at 10:55

## 2023-06-26 RX ADMIN — Medication 25 MILLIGRAM(S): at 10:55

## 2023-06-26 RX ADMIN — PANTOPRAZOLE SODIUM 40 MILLIGRAM(S): 20 TABLET, DELAYED RELEASE ORAL at 10:53

## 2023-06-26 RX ADMIN — Medication 1: at 08:23

## 2023-06-26 RX ADMIN — ZOLPIDEM TARTRATE 5 MILLIGRAM(S): 10 TABLET ORAL at 21:45

## 2023-06-26 RX ADMIN — BUDESONIDE AND FORMOTEROL FUMARATE DIHYDRATE 2 PUFF(S): 160; 4.5 AEROSOL RESPIRATORY (INHALATION) at 22:08

## 2023-06-26 RX ADMIN — Medication 2: at 12:45

## 2023-06-26 RX ADMIN — Medication 240 MILLIGRAM(S): at 10:56

## 2023-06-26 RX ADMIN — AZITHROMYCIN 255 MILLIGRAM(S): 500 TABLET, FILM COATED ORAL at 21:45

## 2023-06-26 RX ADMIN — Medication 400 MILLIGRAM(S): at 11:10

## 2023-06-26 RX ADMIN — SIMVASTATIN 20 MILLIGRAM(S): 20 TABLET, FILM COATED ORAL at 21:45

## 2023-06-26 RX ADMIN — BUDESONIDE AND FORMOTEROL FUMARATE DIHYDRATE 2 PUFF(S): 160; 4.5 AEROSOL RESPIRATORY (INHALATION) at 08:10

## 2023-06-26 RX ADMIN — Medication 40 MILLIGRAM(S): at 10:55

## 2023-06-26 RX ADMIN — APIXABAN 2.5 MILLIGRAM(S): 2.5 TABLET, FILM COATED ORAL at 10:54

## 2023-06-26 RX ADMIN — Medication 1: at 17:40

## 2023-06-26 RX ADMIN — APIXABAN 5 MILLIGRAM(S): 2.5 TABLET, FILM COATED ORAL at 21:45

## 2023-06-26 RX ADMIN — CEFTRIAXONE 1000 MILLIGRAM(S): 500 INJECTION, POWDER, FOR SOLUTION INTRAMUSCULAR; INTRAVENOUS at 21:44

## 2023-06-26 RX ADMIN — Medication 1000 UNIT(S): at 10:55

## 2023-06-26 RX ADMIN — GABAPENTIN 100 MILLIGRAM(S): 400 CAPSULE ORAL at 21:44

## 2023-06-26 RX ADMIN — Medication 300 MILLIGRAM(S): at 10:54

## 2023-06-26 NOTE — PHYSICAL THERAPY INITIAL EVALUATION ADULT - MARITAL STATUS
of leukemia; pt has 5 adult children ,Nas and Abelardo live nearby and dtr Josefa very closeby assists as needed/

## 2023-06-26 NOTE — PHYSICAL THERAPY INITIAL EVALUATION ADULT - PERTINENT HX OF CURRENT PROBLEM, REHAB EVAL
Pt is a 83 y/o female w/PMHx of HTN, HLD, DM, Paroxysmal Afib, ANU, COPD on 6L O2 at home, presents for SOB. Of note, pt was at ED on 6/22 fo left arm fracture, and discharged with sling, but since that point, was c/o worsening SOB Pt is a 83 y/o female w/PMHx of HTN, HLD, DM, Ulcerative Colitis ,Paroxysmal Afib, ANU on CPAP in home,, COPD on 6L O2 at home, presents for SOB. Of note, pt was seen in  ED on 6/22 for left humeral head fracture, and discharged with sling, but since that point, was c/o worsening shortness of breath. Pt met SIRS criteria due to elevated Lactate level, increased HR/Respiratory rate Pt is a 81 y/o female w/PMHx of HTN, HLD, DM, Ulcerative Colitis ,Paroxysmal Afib, ANU on CPAP in home,, COPD on 6L O2 at home, presents for SOB. Of note, pt was seen in  ED on 6/22 for L surgical neck of the humerus  fracture, and discharged with sling, but since that point, was c/o worsening shortness of breath. Pt met SIRS criteria due to elevated Lactate level, increased HR/Respiratory rate

## 2023-06-26 NOTE — PHYSICAL THERAPY INITIAL EVALUATION ADULT - ACTIVE RANGE OF MOTION EXAMINATION, REHAB EVAL
LUE supported in sling ,pt able to move L wrist/fingers fully ,proximal LUE not tested/contraindicated at this time/Right UE Active ROM was WNL (within normal limits)/bilateral  lower extremity Active ROM was WFL (within functional limits)

## 2023-06-26 NOTE — PHYSICAL THERAPY INITIAL EVALUATION ADULT - MODALITIES TREATMENT COMMENTS
pt reminded need for follow-up with Orthopedist on DC ,was referred to Dr Bobby Roth at [facility] ([facility] on release from ED 6/23 with instruction to see in 4-6 days

## 2023-06-26 NOTE — PHYSICAL THERAPY INITIAL EVALUATION ADULT - PATIENT PROFILE REVIEW, REHAB EVAL
pt with recent L humeral head fx sustained when she lost balance carrying O2 tank after leaving movie theater on last Thursday 6/22/23 , she is being managed conservatively in sling applied in ED by Ortho Resident Dr RASHAAD Medley, she is supposed to follow up with Dr Bobby Roth as OP/yes pt with recent L proximal  humeral fx (surgical neck of humerus) sustained when she lost balance carrying O2 tank after leaving movie theater on last Thursday 6/22/23 , she is being managed conservatively in sling applied in ED by Ortho Resident Dr RASHAAD Medley, she is supposed to follow up with Dr Bobby Roth as OP/yes

## 2023-06-26 NOTE — PHYSICAL THERAPY INITIAL EVALUATION ADULT - NSACTIVITYREC_GEN_A_PT
out of bed to chair in L arm sling with O2 6L/min continuous , pt has extension tubing that should allow amb to/from toilet with SBG/sup of nursing staff for safety given falls risk ,progressive amb/gait training with cane R hand and O2 in preparation for Dc

## 2023-06-26 NOTE — PROGRESS NOTE ADULT - ASSESSMENT
Pt is a 81 y/o female w/PMHx of HTN, HLD, DM, Paroxysmal Afib, ANU, COPD on 6L O2 at home, presents for SOB. She is admitted for:    PROBLEMS:    Acute sepsis 2/2 CAP  Acute Hypoxic Respiratory Failure 2/2 CAP/COPD & EMPHYSEMA  Acute excerbation of COPD  REBECA  T2DM  HTN/HLD/DM  Paroxysmal Afib,    PLAN:    Titrate fio2- Supplemental O2-titrate down as tolerated-pulmonary better  IV ceftriaxone + Azithromycin  Po prednisone 40mg qdaily  F/u cultures  Pulse Ox  Incentive spirometry  REBECA- hydration, IV NS at 75 cc/hr  Avoid nephrotoxic medicines  Trend Cr  [id number]  DVT ppx-On Eliquis

## 2023-06-26 NOTE — PROGRESS NOTE ADULT - ASSESSMENT
Pt is a 83 y/o female w/PMHx of HTN, HLD, DM, Paroxysmal Afib, ANU, COPD on 6L O2 at home, presents for SOB. She is admitted for:    #Acute sepsis 2/2 CAP, improving   #Acute Hypoxic Respiratory Failure 2/2 CAP  - Pt with sob, fever, chills, hypoxic in the ED requiring Venti mask, meeting SIRS criteria d/t elevated temps, HR, RR, a/w high lactate  - CT chest as noted above  - Supplemental O2, titrate down as tolerated  - C/w ceftriaxone + Azithromycin  - blood culture NGTD   - Pulse Ox  - Incentive spirometry  - Monitor temps, CBC, lactate  - have melatonin, ambien prn for sleep  - changed from IV solumedrol to oral prednisone 40mg Q24     #REBECA, resolved   - Cr elevated on admission  - Gentle hydration, IV NS at 75 cc/hr  - Avoid nephrotoxic medicines  - Continue to trend Cr    #T2DM  - ISS  - Monitor glucose, A1C: 6.4    #HTN, HLD, DM, Paroxysmal Afib, COPD  - Continue with home meds    #DVT ppx  - On Eliquis

## 2023-06-26 NOTE — PHYSICAL THERAPY INITIAL EVALUATION ADULT - DIAGNOSIS, PT EVAL
shortness of breath, acute exacerbation of COPD, L humeral head fx (6/22/23) being managed in sling shortness of breath, +sepsis due to Community Acquired PNA /acute hypoxic respiratory failure ,acute exacerbation of  COPD, L humeral head fx (6/22/23) being managed in sling shortness of breath, +sepsis due to Community Acquired PNA /acute hypoxic respiratory failure ,acute exacerbation of  COPD, L humeral surgical neck  fx with mild angulation deformity  (6/22/23) being managed in sling

## 2023-06-26 NOTE — PHYSICAL THERAPY INITIAL EVALUATION ADULT - IMPAIRMENTS FOUND, PT EVAL
L shoulder joint with acute proximal humeral fx involving surgical neck/gait, locomotion, and balance/joint integrity and mobility/ventilation and respiration/gas exchange

## 2023-06-27 ENCOUNTER — TRANSCRIPTION ENCOUNTER (OUTPATIENT)
Age: 83
End: 2023-06-27

## 2023-06-27 VITALS
RESPIRATION RATE: 18 BRPM | DIASTOLIC BLOOD PRESSURE: 60 MMHG | HEART RATE: 58 BPM | TEMPERATURE: 98 F | SYSTOLIC BLOOD PRESSURE: 133 MMHG | OXYGEN SATURATION: 90 %

## 2023-06-27 LAB
ALBUMIN SERPL ELPH-MCNC: 2.9 G/DL — LOW (ref 3.3–5)
ALP SERPL-CCNC: 65 U/L — SIGNIFICANT CHANGE UP (ref 40–120)
ALT FLD-CCNC: 19 U/L — SIGNIFICANT CHANGE UP (ref 12–78)
ANION GAP SERPL CALC-SCNC: 3 MMOL/L — LOW (ref 5–17)
AST SERPL-CCNC: 9 U/L — LOW (ref 15–37)
BASOPHILS # BLD AUTO: 0.01 K/UL — SIGNIFICANT CHANGE UP (ref 0–0.2)
BASOPHILS NFR BLD AUTO: 0.1 % — SIGNIFICANT CHANGE UP (ref 0–2)
BILIRUB SERPL-MCNC: 0.3 MG/DL — SIGNIFICANT CHANGE UP (ref 0.2–1.2)
BUN SERPL-MCNC: 22 MG/DL — SIGNIFICANT CHANGE UP (ref 7–23)
CALCIUM SERPL-MCNC: 8.7 MG/DL — SIGNIFICANT CHANGE UP (ref 8.5–10.1)
CHLORIDE SERPL-SCNC: 108 MMOL/L — SIGNIFICANT CHANGE UP (ref 96–108)
CO2 SERPL-SCNC: 30 MMOL/L — SIGNIFICANT CHANGE UP (ref 22–31)
CREAT SERPL-MCNC: 0.93 MG/DL — SIGNIFICANT CHANGE UP (ref 0.5–1.3)
EGFR: 61 ML/MIN/1.73M2 — SIGNIFICANT CHANGE UP
EOSINOPHIL # BLD AUTO: 0 K/UL — SIGNIFICANT CHANGE UP (ref 0–0.5)
EOSINOPHIL NFR BLD AUTO: 0 % — SIGNIFICANT CHANGE UP (ref 0–6)
GLUCOSE BLDC GLUCOMTR-MCNC: 116 MG/DL — HIGH (ref 70–99)
GLUCOSE BLDC GLUCOMTR-MCNC: 180 MG/DL — HIGH (ref 70–99)
GLUCOSE SERPL-MCNC: 121 MG/DL — HIGH (ref 70–99)
HCT VFR BLD CALC: 28.2 % — LOW (ref 34.5–45)
HGB BLD-MCNC: 8.4 G/DL — LOW (ref 11.5–15.5)
IMM GRANULOCYTES NFR BLD AUTO: 0.9 % — SIGNIFICANT CHANGE UP (ref 0–0.9)
LYMPHOCYTES # BLD AUTO: 1.13 K/UL — SIGNIFICANT CHANGE UP (ref 1–3.3)
LYMPHOCYTES # BLD AUTO: 11.1 % — LOW (ref 13–44)
MCHC RBC-ENTMCNC: 21.8 PG — LOW (ref 27–34)
MCHC RBC-ENTMCNC: 29.8 GM/DL — LOW (ref 32–36)
MCV RBC AUTO: 73.1 FL — LOW (ref 80–100)
MONOCYTES # BLD AUTO: 0.62 K/UL — SIGNIFICANT CHANGE UP (ref 0–0.9)
MONOCYTES NFR BLD AUTO: 6.1 % — SIGNIFICANT CHANGE UP (ref 2–14)
NEUTROPHILS # BLD AUTO: 8.29 K/UL — HIGH (ref 1.8–7.4)
NEUTROPHILS NFR BLD AUTO: 81.8 % — HIGH (ref 43–77)
PLATELET # BLD AUTO: 366 K/UL — SIGNIFICANT CHANGE UP (ref 150–400)
POTASSIUM SERPL-MCNC: 4.6 MMOL/L — SIGNIFICANT CHANGE UP (ref 3.5–5.3)
POTASSIUM SERPL-SCNC: 4.6 MMOL/L — SIGNIFICANT CHANGE UP (ref 3.5–5.3)
PROT SERPL-MCNC: 6.5 GM/DL — SIGNIFICANT CHANGE UP (ref 6–8.3)
RBC # BLD: 3.86 M/UL — SIGNIFICANT CHANGE UP (ref 3.8–5.2)
RBC # FLD: 19.4 % — HIGH (ref 10.3–14.5)
SODIUM SERPL-SCNC: 141 MMOL/L — SIGNIFICANT CHANGE UP (ref 135–145)
WBC # BLD: 10.14 K/UL — SIGNIFICANT CHANGE UP (ref 3.8–10.5)
WBC # FLD AUTO: 10.14 K/UL — SIGNIFICANT CHANGE UP (ref 3.8–10.5)

## 2023-06-27 PROCEDURE — 99239 HOSP IP/OBS DSCHRG MGMT >30: CPT

## 2023-06-27 RX ORDER — AZITHROMYCIN 500 MG/1
1 TABLET, FILM COATED ORAL
Qty: 3 | Refills: 0
Start: 2023-06-27

## 2023-06-27 RX ORDER — CEFUROXIME AXETIL 250 MG
1 TABLET ORAL
Qty: 6 | Refills: 0
Start: 2023-06-27 | End: 2023-06-29

## 2023-06-27 RX ADMIN — Medication 40 MILLIGRAM(S): at 10:02

## 2023-06-27 RX ADMIN — BUDESONIDE AND FORMOTEROL FUMARATE DIHYDRATE 2 PUFF(S): 160; 4.5 AEROSOL RESPIRATORY (INHALATION) at 08:09

## 2023-06-27 RX ADMIN — APIXABAN 5 MILLIGRAM(S): 2.5 TABLET, FILM COATED ORAL at 10:03

## 2023-06-27 RX ADMIN — FAMOTIDINE 20 MILLIGRAM(S): 10 INJECTION INTRAVENOUS at 10:02

## 2023-06-27 RX ADMIN — PANTOPRAZOLE SODIUM 40 MILLIGRAM(S): 20 TABLET, DELAYED RELEASE ORAL at 10:02

## 2023-06-27 RX ADMIN — Medication 400 MILLIGRAM(S): at 12:12

## 2023-06-27 RX ADMIN — Medication 1: at 12:38

## 2023-06-27 RX ADMIN — Medication 1000 UNIT(S): at 10:03

## 2023-06-27 RX ADMIN — Medication 300 MILLIGRAM(S): at 10:03

## 2023-06-27 RX ADMIN — Medication 240 MILLIGRAM(S): at 10:02

## 2023-06-27 RX ADMIN — Medication 25 MILLIGRAM(S): at 10:02

## 2023-06-27 NOTE — DISCHARGE NOTE PROVIDER - NSDCMRMEDTOKEN_GEN_ALL_CORE_FT
allopurinol 300 mg oral tablet: 1 tab(s) orally once a day  Apriso 0.375 g oral capsule, extended release: 4 cap(s) orally once a day  azithromycin 500 mg oral tablet: 1 tab(s) orally once a day  Cartia  mg/24 hours oral capsule, extended release: 1 cap(s) orally once a day  cefuroxime 500 mg oral tablet: 1 tab(s) orally 2 times a day  CoQ10: orally once a day  dicyclomine 10 mg oral capsule: 1 cap(s) orally 3 times a day, As Needed  Eliquis 5 mg oral tablet: 1 tab(s) orally 2 times a day  Resume at 11pm tonight (8/19/20)  metFORMIN 500 mg oral tablet: 2 tab(s) orally once a day (in the morning)  metFORMIN 500 mg oral tablet: 1 tab(s) orally once (at bedtime)  Metoprolol Succinate ER 25 mg oral tablet, extended release: 1 tab(s) orally once a day  predniSONE 10 mg oral tablet: 1 tab(s) orally once a day Please take 2 tabs=20mg once a day for 2 days then 1 tab = 10mg once a day for 2 days  Probiotic Formula oral capsule: 1 cap(s) orally 2 times a day  Prolia 60 mg/mL subcutaneous solution: subcutaneous every 6 months  simvastatin 20 mg oral tablet: 1 tab(s) orally once a day (at bedtime)  Trelegy Ellipta 100 mcg-62.5 mcg-25 mcg/inh inhalation powder: 1 puff(s) inhaled once a day  Vitamin D3 25 mcg (1000 intl units) oral tablet: 1 tab(s) orally once a day  zolpidem 5 mg oral tablet: 1 tab(s) orally once a day (at bedtime), As Needed

## 2023-06-27 NOTE — DISCHARGE NOTE NURSING/CASE MANAGEMENT/SOCIAL WORK - NSDCPEFALRISK_GEN_ALL_CORE
For information on Fall & Injury Prevention, visit: [email or web address] OR  [email or web address] OR  https://www.cdc.gov/steadi/patient.html

## 2023-06-27 NOTE — DISCHARGE NOTE PROVIDER - NSDCCPCAREPLAN_GEN_ALL_CORE_FT
PRINCIPAL DISCHARGE DIAGNOSIS  Diagnosis: Pneumonia  Assessment and Plan of Treatment: You wre admitted for acute sepsis and acute hypoxic respiratory failure due to community acquired pneumonia. Pulmonology was consulted. You were put on steroid and antibiotics : ceftriaxone and azithromycin. Blood culture negative to date. Incentive spirometry was encouraged. Since the initiation of treatment, you have shown significant clinical improvement.   Upon discharge, you are back to using 6L NC which is your baseline. Other vital signs and labs stable.   Please take prenidsone 20mg once a day for 2 days then 10mg once a day for 2 days. Please take 3 more days of ceftin and azithromycin. Please follow up with your pulmonologist and primary care provider for further eval and management.        SECONDARY DISCHARGE DIAGNOSES  Diagnosis: REBECA (acute kidney injury)  Assessment and Plan of Treatment: You also concerns of REBECA which was resolved  with initiation of IVF. You will need to follow up with your primary care provider for further eval and management.

## 2023-06-27 NOTE — PROGRESS NOTE ADULT - ASSESSMENT
Pt is a 81 y/o female w/PMHx of HTN, HLD, DM, Paroxysmal Afib, ANU, COPD on 6L O2 at home, presents for SOB. She is admitted for:    PROBLEMS:    Acute sepsis 2/2 CAP  Acute Hypoxic Respiratory Failure 2/2 CAP/COPD & EMPHYSEMA  Acute excerbation of COPD  REBECA  T2DM  HTN/HLD/DM  Paroxysmal Afib,    PLAN:    Titrate fio2- Supplemental O2-titrate down as tolerated-pulmonary stable- decd planning  IV ceftriaxone + Azithromycin  Po prednisone 40mg qdaily  F/u cultures  Pulse Ox  Incentive spirometry  REBECA- hydration, IV NS at 75 cc/hr  Avoid nephrotoxic medicines  Trend Cr  [id number]  DVT ppx-On Eliquis

## 2023-06-27 NOTE — DISCHARGE NOTE PROVIDER - HOSPITAL COURSE
Pt is a 83 y/o female w/PMHx of HTN, HLD, DM, Paroxysmal Afib, ANU, COPD on 6L O2 at home, presents for SOB. Of note, pt was at ED on 6/22 fo left arm fracture, and discharged with sling, but since that point, was c/o worsening SOB, no alleviating/aggravating sxs, associated with chills, possible fever, and some cough, causing her to present to . In the ED, pt was found to be febrile with Tmax at 102, w/HR at 103, RR at 30, and hypoxic as well, requiring venti mask on 15 L. Labs showed WBC at 20.46, H&H at 8.8/29.4. BUN/Cr at 25/1.67. Na at 134. Lactate was 3.3>4.7. CT chest showed bibasilar bronchial thickening and impaction of the airways R>L a/w mild associated bibasilar atelectasis, and scattered small subcentimeter groundglass nodules most prominent in the right upper lobe. Also stable 6 mm rt upper lobe nodule and b/l thyroid nodules.    Pt is admitted for acute sepsis and acute hypoxic respiratory failure 2/2 CAP. Pulmonology was consulted. Pt was put on steroid and antibiotics : ceftriaxone and azithromycin. Blood culture negative to date. Incentive spirometry was encouraged. Since the initiation of treatment, pt has shown significant clinical improvement.     Upon discharge, pt is back to using 6L NC which is her baseline. Other vital signs and labs stable.     Pt will be discharged on steroid taper and 3 more days of antibiotics :  ceftin and azithromycin. Pt will need to follow up with her pulmonologist and primary care provider for further eval and management.    Pt also has concerns of REBECA which was resolved  with initiation of IVF. Pt will need to follow up wit her primary care provider for further eval and management.     82y YO Female Presented to Hospital on 06-24-23  Patient is a 82y old  Female who presents with a chief complaint of sob (27 Jun 2023 12:01)  . In the ED, Patient was found to have   Labs and Imaging revealed   Patient was started on   Patient was transferred to [facility]  Consults included     Patient was seen on 06-27. He is medically clear for discharge.   Patient will need follow up with   Patient will be discharged on     Subjective: Patient was seen and examined by me this morning at bedside.     REVIEW OF SYSTEMS:  CONSTITUTIONAL: No weakness, fevers or chills  EYES/ENT: No visual changes;  No vertigo or throat pain   NECK: No pain or stiffness  RESPIRATORY: No cough, wheezing, hemoptysis; No shortness of breath  CARDIOVASCULAR: No chest pain or palpitations  GASTROINTESTINAL: No abdominal or epigastric pain. No nausea, vomiting; No diarrhea or constipation.   GENITOURINARY: No dysuria, frequency or hematuria  NEUROLOGICAL: No numbness or weakness  SKIN: No itching, burning, rashes, or lesions     PHYSICAL EXAM:  Vital Signs Last 24 Hrs  T(C): 36.4 (27 Jun 2023 08:31), Max: 36.6 (26 Jun 2023 15:52)  T(F): 97.5 (27 Jun 2023 08:31), Max: 97.9 (26 Jun 2023 15:52)  HR: 63 (27 Jun 2023 08:31) (61 - 78)  BP: 135/63 (27 Jun 2023 08:31) (129/57 - 142/51)  BP(mean): 78 (26 Jun 2023 15:52) (78 - 78)  RR: 18 (27 Jun 2023 08:31) (18 - 18)  SpO2: 92% (27 Jun 2023 08:31) (92% - 95%)    Parameters below as of 27 Jun 2023 08:31  Patient On (Oxygen Delivery Method): nasal cannula  O2 Flow (L/min): 6    Constitutional: Pt lying in bed, awake and alert, NAD  HEENT: EOMI, normal hearing, moist mucous membranes  Neck: Soft and supple, no JVD  Respiratory: diminish breath sound and rhonchi b/l lung, R>L , improving   Cardiovascular: S1S2+, RRR, no M/G/R  Gastrointestinal: BS+, soft, NT/ND, no guarding, no rebound  Extremities: No peripheral edema  Vascular: 2+ peripheral pulses  Neurological: AAOx3, no focal deficits  Musculoskeletal: 5/5 strength b/l upper and lower extremities  Skin: No rashes

## 2023-06-27 NOTE — DISCHARGE NOTE NURSING/CASE MANAGEMENT/SOCIAL WORK - PATIENT PORTAL LINK FT
You can access the FollowMyHealth Patient Portal offered by [facility] by registering at the following website: [email or web address]. By joining [facility]’s FollowMyHealth portal, you will also be able to view your health information using other applications (apps) compatible with our system.

## 2023-06-27 NOTE — PROGRESS NOTE ADULT - SUBJECTIVE AND OBJECTIVE BOX
Subjective:    pat better, sitting in chair, no new respiratory complaints.    Home Medications:  allopurinol 300 mg oral tablet: 1 tab(s) orally once a day (25 Jun 2023 12:53)  Apriso 0.375 g oral capsule, extended release: 4 cap(s) orally once a day (25 Jun 2023 12:53)  Cartia  mg/24 hours oral capsule, extended release: 1 cap(s) orally once a day (25 Jun 2023 12:53)  CoQ10: orally once a day (25 Jun 2023 12:53)  dicyclomine 10 mg oral capsule: 1 cap(s) orally 3 times a day, As Needed (25 Jun 2023 12:53)  Eliquis 5 mg oral tablet: 1 tab(s) orally 2 times a day  Resume at 11pm tonight (8/19/20) (25 Jun 2023 12:53)  metFORMIN 500 mg oral tablet: 2 tab(s) orally once a day (in the morning) (25 Jun 2023 12:53)  metFORMIN 500 mg oral tablet: 1 tab(s) orally once (at bedtime) (25 Jun 2023 12:53)  Metoprolol Succinate ER 25 mg oral tablet, extended release: 1 tab(s) orally once a day (25 Jun 2023 12:53)  Probiotic Formula oral capsule: 1 cap(s) orally 2 times a day (25 Jun 2023 12:53)  Prolia 60 mg/mL subcutaneous solution: subcutaneous every 6 months (25 Jun 2023 12:54)  simvastatin 20 mg oral tablet: 1 tab(s) orally once a day (at bedtime) (25 Jun 2023 12:53)  Trelegy Ellipta 100 mcg-62.5 mcg-25 mcg/inh inhalation powder: 1 puff(s) inhaled once a day (25 Jun 2023 12:54)  Vitamin D3 25 mcg (1000 intl units) oral tablet: 1 tab(s) orally once a day (25 Jun 2023 12:54)  zolpidem 5 mg oral tablet: 1 tab(s) orally once a day (at bedtime), As Needed (25 Jun 2023 12:54)    MEDICATIONS  (STANDING):  allopurinol 300 milliGRAM(s) Oral daily  apixaban 5 milliGRAM(s) Oral every 12 hours  azithromycin  IVPB 500 milliGRAM(s) IV Intermittent every 24 hours  budesonide  80 MICROgram(s)/formoterol 4.5 MICROgram(s) Inhaler 2 Puff(s) Inhalation two times a day  cefTRIAXone Injectable. 1000 milliGRAM(s) IV Push every 24 hours  cholecalciferol 1000 Unit(s) Oral daily  dextrose 5%. 1000 milliLiter(s) (50 mL/Hr) IV Continuous <Continuous>  dextrose 5%. 1000 milliLiter(s) (100 mL/Hr) IV Continuous <Continuous>  dextrose 50% Injectable 25 Gram(s) IV Push once  dextrose 50% Injectable 12.5 Gram(s) IV Push once  dextrose 50% Injectable 25 Gram(s) IV Push once  diltiazem    milliGRAM(s) Oral daily  famotidine    Tablet 20 milliGRAM(s) Oral daily  gabapentin 100 milliGRAM(s) Oral at bedtime  glucagon  Injectable 1 milliGRAM(s) IntraMuscular once  insulin lispro (ADMELOG) corrective regimen sliding scale   SubCutaneous three times a day before meals  insulin lispro (ADMELOG) corrective regimen sliding scale   SubCutaneous at bedtime  mesalamine DR Capsule 400 milliGRAM(s) Oral daily  metoprolol succinate ER 25 milliGRAM(s) Oral daily  pantoprazole    Tablet 40 milliGRAM(s) Oral before breakfast  polyethylene glycol 3350 17 Gram(s) Oral daily  predniSONE   Tablet 40 milliGRAM(s) Oral daily  simvastatin 20 milliGRAM(s) Oral at bedtime    MEDICATIONS  (PRN):  acetaminophen     Tablet .. 650 milliGRAM(s) Oral every 6 hours PRN Temp greater or equal to 38C (100.4F), Mild Pain (1 - 3)  aluminum hydroxide/magnesium hydroxide/simethicone Suspension 30 milliLiter(s) Oral every 6 hours PRN Heartburn  calcium carbonate    500 mG (Tums) Chewable 1 Tablet(s) Chew two times a day PRN Heartburn  dextrose Oral Gel 15 Gram(s) Oral once PRN Blood Glucose LESS THAN 70 milliGRAM(s)/deciliter  dicyclomine 10 milliGRAM(s) Oral three times a day before meals PRN IBS  melatonin 3 milliGRAM(s) Oral at bedtime PRN Sleep  ondansetron   Disintegrating Tablet 4 milliGRAM(s) Oral every 8 hours PRN for nausea  zolpidem 5 milliGRAM(s) Oral at bedtime PRN Insomnia      Allergies    aspirin (Stomach Upset)    Intolerances        Vital Signs Last 24 Hrs  T(C): 36.4 (27 Jun 2023 08:31), Max: 36.6 (26 Jun 2023 15:52)  T(F): 97.5 (27 Jun 2023 08:31), Max: 97.9 (26 Jun 2023 15:52)  HR: 63 (27 Jun 2023 08:31) (61 - 78)  BP: 135/63 (27 Jun 2023 08:31) (129/57 - 142/51)  BP(mean): 78 (26 Jun 2023 15:52) (78 - 78)  RR: 18 (27 Jun 2023 08:31) (18 - 18)  SpO2: 92% (27 Jun 2023 08:31) (92% - 95%)    Parameters below as of 27 Jun 2023 08:31  Patient On (Oxygen Delivery Method): nasal cannula  O2 Flow (L/min): 6        PHYSICAL EXAMINATION:    NECK:  Supple. No lymphadenopathy. Jugular venous pressure not elevated. Carotids equal.   HEART:   The cardiac impulse has a normal quality. Reg., Nl S1 and S2.  There are no murmurs, rubs or gallops noted  CHEST:  Chest is clear to auscultation. Normal respiratory effort.  ABDOMEN:  Soft and nontender.   EXTREMITIES:  There is no edema.       LABS:                        8.4    10.14 )-----------( 366      ( 27 Jun 2023 06:41 )             28.2     06-27    141  |  108  |  22  ----------------------------<  121<H>  4.6   |  30  |  0.93    Ca    8.7      27 Jun 2023 06:41    TPro  6.5  /  Alb  2.9<L>  /  TBili  0.3  /  DBili  x   /  AST  9<L>  /  ALT  19  /  AlkPhos  65  06-27      Urinalysis Basic - ( 27 Jun 2023 06:41 )    Color: x / Appearance: x / SG: x / pH: x  Gluc: 121 mg/dL / Ketone: x  / Bili: x / Urobili: x   Blood: x / Protein: x / Nitrite: x   Leuk Esterase: x / RBC: x / WBC x   Sq Epi: x / Non Sq Epi: x / Bacteria: x            
Pt has been seen and examined with [facility] resident, resident supervised agree with a/p       Patient is a 82y old  Female who presents with a chief complaint of PNA (25 Jun 2023 16:57)      HPI:  Pt is a 81 y/o female w/PMHx of HTN, HLD, DM, Paroxysmal Afib, ANU, COPD on 6L O2 at home, presents for SOB. Of note, pt was at ED on 6/22 fo left arm fracture, and discharged with sling, but since that point, was c/o worsening SOB   (24 Jun 2023 08:13)        -rs-aeeb, cta  -cvs-s1s2 normal   -p/a-soft,bs+      A/P    #D/c today, time spent 45 minutes 
Pt has been seen and examined with [facility] resident, resident supervised agree with a/p       Patient is a 82y old  Female who presents with a chief complaint of PNA (25 Jun 2023 16:57)      HPI:  Pt is a 81 y/o female w/PMHx of HTN, HLD, DM, Paroxysmal Afib, ANU, COPD on 6L O2 at home, presents for SOB. Of note, pt was at ED on 6/22 fo left arm fracture, and discharged with sling, but since that point, was c/o worsening SOB   (24 Jun 2023 08:13)      PHYSICAL EXAM:  Vital Signs Last 24 Hrs  T(C): 36.4 (26 Jun 2023 07:55), Max: 36.4 (26 Jun 2023 07:55)  T(F): 97.5 (26 Jun 2023 07:55), Max: 97.5 (26 Jun 2023 07:55)  HR: 66 (26 Jun 2023 07:55) (64 - 78)  BP: 144/66 (26 Jun 2023 07:55) (133/70 - 144/66)  BP(mean): --  RR: 18 (26 Jun 2023 07:55) (18 - 18)  SpO2: 100% (26 Jun 2023 07:55) (91% - 100%)    Parameters below as of 26 Jun 2023 07:55  Patient On (Oxygen Delivery Method): nasal cannula  O2 Flow (L/min): 6    -rs-aeeb, cta  -cvs-s1s2 normal   -p/a-soft,bs+      A/P    #D/c plan     #ct abx, supportive care     #dvt pr   
Pt is a 83 y/o female w/PMHx of HTN, HLD, DM, Paroxysmal Afib, ANU, COPD on 6L O2 at home, presents for SOB. Of note, pt was at ED on 6/22 fo left arm fracture, and discharged with sling, but since that point, was c/o worsening SOB, no alleviating/aggravating sxs, associated with chills, possible fever, and some cough, causing her to present to . In the ED, pt was found to be febrile with Tmax at 102, w/HR at 103, RR at 30, and hypoxic as well, requiring venti mask on 15 L. Labs showed WBC at 20.46, H&H at 8.8/29.4. BUN/Cr at 25/1.67. Na at 134. Lactate was 3.3>4.7. CT chest showed bibasilar bronchial thickening and impaction of the airways R>L a/w mild associated bibasilar atelectasis, and scattered small subcentimeter groundglass nodules most prominent in the right upper lobe. Also stable 6 mm rt upper lobe nodule and b/l thyroid nodules    6/25 pt reports feeling better, not in respiratory distress. Now doing 6L NA, same as home oxygen. Chronic insomnia issues, take ambien PRN, and supplement that contains melatonin and other herbs. Daughter at bedside, all concerns/ questions addressed.     6/26 pt reports feeling better with breathing. All concerns/ questions addressed       REVIEW OF SYSTEMS:  CONSTITUTIONAL: No weakness, fevers or chills  EYES/ENT: No visual changes;  No vertigo or throat pain   NECK: No pain or stiffness  RESPIRATORY: No cough, wheezing, hemoptysis; No shortness of breath  CARDIOVASCULAR: No chest pain or palpitations  GASTROINTESTINAL: No abdominal or epigastric pain. No nausea, vomiting; No diarrhea or constipation.   GENITOURINARY: No dysuria, frequency or hematuria  NEUROLOGICAL: No numbness or weakness  SKIN: No itching, burning, rashes, or lesions       Vital Signs Last 24 Hrs  T(C): 36.6 (26 Jun 2023 15:52), Max: 36.6 (26 Jun 2023 15:52)  T(F): 97.9 (26 Jun 2023 15:52), Max: 97.9 (26 Jun 2023 15:52)  HR: 61 (26 Jun 2023 15:52) (61 - 78)  BP: 129/57 (26 Jun 2023 15:52) (129/57 - 148/58)  BP(mean): 78 (26 Jun 2023 15:52) (78 - 78)  RR: 18 (26 Jun 2023 15:52) (18 - 18)  SpO2: 95% (26 Jun 2023 15:52) (91% - 100%)    Parameters below as of 26 Jun 2023 15:52  Patient On (Oxygen Delivery Method): nasal cannula  O2 Flow (L/min): 6    Constitutional: Pt lying in bed, awake and alert, NAD  HEENT: EOMI, normal hearing, moist mucous membranes  Neck: Soft and supple, no JVD  Respiratory diminish breath sound and rhonchi b/l lung, R>L   Cardiovascular: S1S2+, RRR, no M/G/R  Gastrointestinal: BS+, soft, NT/ND, no guarding, no rebound  Extremities: No peripheral edema  Vascular: 2+ peripheral pulses  Neurological: AAOx3, no focal deficits  Musculoskeletal: 5/5 strength b/l upper and lower extremities  Skin: No rashes    MEDICATIONS:    MEDICATIONS  (STANDING):  allopurinol 300 milliGRAM(s) Oral daily  apixaban 5 milliGRAM(s) Oral every 12 hours  azithromycin  IVPB 500 milliGRAM(s) IV Intermittent every 24 hours  budesonide  80 MICROgram(s)/formoterol 4.5 MICROgram(s) Inhaler 2 Puff(s) Inhalation two times a day  cefTRIAXone Injectable. 1000 milliGRAM(s) IV Push every 24 hours  cholecalciferol 1000 Unit(s) Oral daily  dextrose 5%. 1000 milliLiter(s) (50 mL/Hr) IV Continuous <Continuous>  dextrose 5%. 1000 milliLiter(s) (100 mL/Hr) IV Continuous <Continuous>  dextrose 50% Injectable 12.5 Gram(s) IV Push once  dextrose 50% Injectable 25 Gram(s) IV Push once  dextrose 50% Injectable 25 Gram(s) IV Push once  diltiazem    milliGRAM(s) Oral daily  famotidine    Tablet 20 milliGRAM(s) Oral daily  gabapentin 100 milliGRAM(s) Oral at bedtime  glucagon  Injectable 1 milliGRAM(s) IntraMuscular once  insulin lispro (ADMELOG) corrective regimen sliding scale   SubCutaneous three times a day before meals  insulin lispro (ADMELOG) corrective regimen sliding scale   SubCutaneous at bedtime  mesalamine DR Capsule 400 milliGRAM(s) Oral daily  metoprolol succinate ER 25 milliGRAM(s) Oral daily  pantoprazole    Tablet 40 milliGRAM(s) Oral before breakfast  polyethylene glycol 3350 17 Gram(s) Oral daily  predniSONE   Tablet 40 milliGRAM(s) Oral daily  simvastatin 20 milliGRAM(s) Oral at bedtime    MEDICATIONS  (PRN):  acetaminophen     Tablet .. 650 milliGRAM(s) Oral every 6 hours PRN Temp greater or equal to 38C (100.4F), Mild Pain (1 - 3)  aluminum hydroxide/magnesium hydroxide/simethicone Suspension 30 milliLiter(s) Oral every 6 hours PRN Heartburn  calcium carbonate    500 mG (Tums) Chewable 1 Tablet(s) Chew two times a day PRN Heartburn  dextrose Oral Gel 15 Gram(s) Oral once PRN Blood Glucose LESS THAN 70 milliGRAM(s)/deciliter  dicyclomine 10 milliGRAM(s) Oral three times a day before meals PRN IBS  melatonin 3 milliGRAM(s) Oral at bedtime PRN Sleep  ondansetron   Disintegrating Tablet 4 milliGRAM(s) Oral every 8 hours PRN for nausea  zolpidem 5 milliGRAM(s) Oral at bedtime PRN Insomnia    LABS: All Labs Reviewed:                           7.8    14.39 )-----------( 328      ( 26 Jun 2023 06:59 )             25.8   06-26    140  |  110<H>  |  24<H>  ----------------------------<  162<H>  4.7   |  27  |  0.97    Ca    8.8      26 Jun 2023 06:59    TPro  6.5  /  Alb  2.8<L>  /  TBili  0.2  /  DBili  x   /  AST  10<L>  /  ALT  19  /  AlkPhos  69  06-26        Blood Culture: 06-23 @ 21:06  Organism --  Gram Stain Blood -- Gram Stain --  Specimen Source .Blood Blood-Peripheral  Culture-Blood --      I&O's Summary    CAPILLARY BLOOD GLUCOSE      POCT Blood Glucose.: 233 mg/dL (25 Jun 2023 16:20)  POCT Blood Glucose.: 266 mg/dL (25 Jun 2023 12:16)  POCT Blood Glucose.: 191 mg/dL (25 Jun 2023 08:37)  POCT Blood Glucose.: 189 mg/dL (24 Jun 2023 21:19)      RADIOLOGY/EKG:          ACC: [id number] EXAM: CT CHEST ORDERED BY: TJ BARBOUR    PROCEDURE DATE: 06/23/2023        INTERPRETATION: CLINICAL INFORMATION: Shortness of breath and fever.    COMPARISON: CT chest 11/15/2022    CONTRAST/COMPLICATIONS:  IV Contrast: NONE  Oral Contrast: NONE  Complications: None reported at time of study completion    PROCEDURE:  CT Angiography of the Chest.  Sagittal and coronal reformats were performed as well as 3D (MIP) reconstructions.    FINDINGS:    LUNGS AND AIRWAYS: Patent central airways.  6 mm right upper lobe solid nodule (4:85) is unchanged from 11/15/2022.  There is bibasilar bronchial thickening and impaction of the airways more prominent on the right. There is mild associated bibasilar atelectasis. There are scattered small subcentimeter groundglass nodules most prominent in the right upper lobe.  PLEURA: No pleural effusion.  MEDIASTINUM AND MADISON: No lymphadenopathy.  VESSELS: Within normal limits.  HEART: Heart size is normal. No pericardial effusion. Coronary artery and valvular calcifications.  CHEST WALL AND LOWER NECK: Bilateral thyroid nodules. Nonemergent thyroid ultrasound is recommended.  VISUALIZED UPPER ABDOMEN: Within normal limits.  BONES: Degenerative changes.    IMPRESSION:    There is bibasilar bronchial thickening and impaction of the airways more prominent on the right. There is mild associated bibasilar atelectasis. There are scattered small subcentimeter groundglass nodules most prominent in the right upper lobe. Findings may represent infection. Correlate clinically. Recommend follow-up.    A discrete solid 6 mm right upper lobe nodule is unchanged since 15 2022. Continued follow-up is recommended.    Bilateral thyroid nodules. Nonemergent thyroid ultrasound is recommended.  
Subjective:    pat better, sitting in chair, no new complaint.    Home Medications:  allopurinol 300 mg oral tablet: 1 tab(s) orally once a day (25 Jun 2023 12:53)  Apriso 0.375 g oral capsule, extended release: 4 cap(s) orally once a day (25 Jun 2023 12:53)  Cartia  mg/24 hours oral capsule, extended release: 1 cap(s) orally once a day (25 Jun 2023 12:53)  CoQ10: orally once a day (25 Jun 2023 12:53)  dicyclomine 10 mg oral capsule: 1 cap(s) orally 3 times a day, As Needed (25 Jun 2023 12:53)  Eliquis 5 mg oral tablet: 1 tab(s) orally 2 times a day  Resume at 11pm tonight (8/19/20) (25 Jun 2023 12:53)  metFORMIN 500 mg oral tablet: 2 tab(s) orally once a day (in the morning) (25 Jun 2023 12:53)  metFORMIN 500 mg oral tablet: 1 tab(s) orally once (at bedtime) (25 Jun 2023 12:53)  Metoprolol Succinate ER 25 mg oral tablet, extended release: 1 tab(s) orally once a day (25 Jun 2023 12:53)  Probiotic Formula oral capsule: 1 cap(s) orally 2 times a day (25 Jun 2023 12:53)  Prolia 60 mg/mL subcutaneous solution: subcutaneous every 6 months (25 Jun 2023 12:54)  simvastatin 20 mg oral tablet: 1 tab(s) orally once a day (at bedtime) (25 Jun 2023 12:53)  Trelegy Ellipta 100 mcg-62.5 mcg-25 mcg/inh inhalation powder: 1 puff(s) inhaled once a day (25 Jun 2023 12:54)  Vitamin D3 25 mcg (1000 intl units) oral tablet: 1 tab(s) orally once a day (25 Jun 2023 12:54)  zolpidem 5 mg oral tablet: 1 tab(s) orally once a day (at bedtime), As Needed (25 Jun 2023 12:54)    MEDICATIONS  (STANDING):  allopurinol 300 milliGRAM(s) Oral daily  apixaban 2.5 milliGRAM(s) Oral every 12 hours  azithromycin  IVPB 500 milliGRAM(s) IV Intermittent every 24 hours  budesonide  80 MICROgram(s)/formoterol 4.5 MICROgram(s) Inhaler 2 Puff(s) Inhalation two times a day  cefTRIAXone Injectable. 1000 milliGRAM(s) IV Push every 24 hours  cholecalciferol 1000 Unit(s) Oral daily  dextrose 5%. 1000 milliLiter(s) (100 mL/Hr) IV Continuous <Continuous>  dextrose 5%. 1000 milliLiter(s) (50 mL/Hr) IV Continuous <Continuous>  dextrose 50% Injectable 25 Gram(s) IV Push once  dextrose 50% Injectable 25 Gram(s) IV Push once  dextrose 50% Injectable 12.5 Gram(s) IV Push once  diltiazem    milliGRAM(s) Oral daily  famotidine    Tablet 20 milliGRAM(s) Oral daily  gabapentin 100 milliGRAM(s) Oral at bedtime  glucagon  Injectable 1 milliGRAM(s) IntraMuscular once  insulin lispro (ADMELOG) corrective regimen sliding scale   SubCutaneous three times a day before meals  insulin lispro (ADMELOG) corrective regimen sliding scale   SubCutaneous at bedtime  mesalamine DR Capsule 400 milliGRAM(s) Oral daily  metoprolol succinate ER 25 milliGRAM(s) Oral daily  pantoprazole    Tablet 40 milliGRAM(s) Oral before breakfast  predniSONE   Tablet 40 milliGRAM(s) Oral daily  simvastatin 20 milliGRAM(s) Oral at bedtime    MEDICATIONS  (PRN):  acetaminophen     Tablet .. 650 milliGRAM(s) Oral every 6 hours PRN Temp greater or equal to 38C (100.4F), Mild Pain (1 - 3)  aluminum hydroxide/magnesium hydroxide/simethicone Suspension 30 milliLiter(s) Oral every 6 hours PRN Heartburn  calcium carbonate    500 mG (Tums) Chewable 1 Tablet(s) Chew two times a day PRN Heartburn  dextrose Oral Gel 15 Gram(s) Oral once PRN Blood Glucose LESS THAN 70 milliGRAM(s)/deciliter  dicyclomine 10 milliGRAM(s) Oral three times a day before meals PRN IBS  melatonin 3 milliGRAM(s) Oral at bedtime PRN Sleep  ondansetron   Disintegrating Tablet 4 milliGRAM(s) Oral every 8 hours PRN for nausea  zolpidem 5 milliGRAM(s) Oral at bedtime PRN Insomnia      Allergies    aspirin (Stomach Upset)    Intolerances        Vital Signs Last 24 Hrs  T(C): 36.4 (26 Jun 2023 07:55), Max: 36.4 (26 Jun 2023 07:55)  T(F): 97.5 (26 Jun 2023 07:55), Max: 97.5 (26 Jun 2023 07:55)  HR: 76 (26 Jun 2023 11:00) (64 - 78)  BP: 148/58 (26 Jun 2023 11:00) (133/70 - 148/58)  BP(mean): --  RR: 18 (26 Jun 2023 07:55) (18 - 18)  SpO2: 100% (26 Jun 2023 07:55) (91% - 100%)    Parameters below as of 26 Jun 2023 07:55  Patient On (Oxygen Delivery Method): nasal cannula  O2 Flow (L/min): 6        PHYSICAL EXAMINATION:    NECK:  Supple. No lymphadenopathy. Jugular venous pressure not elevated. Carotids equal.   HEART:   The cardiac impulse has a normal quality. Reg., Nl S1 and S2.  There are no murmurs, rubs or gallops noted  CHEST:  Chest crackles to auscultation. Normal respiratory effort.  ABDOMEN:  Soft and nontender.   EXTREMITIES:  There is no edema.       LABS:                        7.8    14.39 )-----------( 328      ( 26 Jun 2023 06:59 )             25.8     06-26    140  |  110<H>  |  24<H>  ----------------------------<  162<H>  4.7   |  27  |  0.97    Ca    8.8      26 Jun 2023 06:59    TPro  6.5  /  Alb  2.8<L>  /  TBili  0.2  /  DBili  x   /  AST  10<L>  /  ALT  19  /  AlkPhos  69  06-26      Urinalysis Basic - ( 26 Jun 2023 06:59 )    Color: x / Appearance: x / SG: x / pH: x  Gluc: 162 mg/dL / Ketone: x  / Bili: x / Urobili: x   Blood: x / Protein: x / Nitrite: x   Leuk Esterase: x / RBC: x / WBC x   Sq Epi: x / Non Sq Epi: x / Bacteria: x            
Pt is a 83 y/o female w/PMHx of HTN, HLD, DM, Paroxysmal Afib, ANU, COPD on 6L O2 at home, presents for SOB. Of note, pt was at ED on 6/22 fo left arm fracture, and discharged with sling, but since that point, was c/o worsening SOB, no alleviating/aggravating sxs, associated with chills, possible fever, and some cough, causing her to present to . In the ED, pt was found to be febrile with Tmax at 102, w/HR at 103, RR at 30, and hypoxic as well, requiring venti mask on 15 L. Labs showed WBC at 20.46, H&H at 8.8/29.4. BUN/Cr at 25/1.67. Na at 134. Lactate was 3.3>4.7. CT chest showed bibasilar bronchial thickening and impaction of the airways R>L a/w mild associated bibasilar atelectasis, and scattered small subcentimeter groundglass nodules most prominent in the right upper lobe. Also stable 6 mm rt upper lobe nodule and b/l thyroid nodules    6/25 pt reports feeling better, not in respiratory distress. Now doing 6L NA, same as home oxygen. Chronic insomnia issues, take ambien PRN, and supplement that contains melatonin and other herbs. Daughter at bedside, all concerns/ questions addressed       REVIEW OF SYSTEMS:  CONSTITUTIONAL: No weakness, fevers or chills  EYES/ENT: No visual changes;  No vertigo or throat pain   NECK: No pain or stiffness  RESPIRATORY: No cough, wheezing, hemoptysis; No shortness of breath  CARDIOVASCULAR: No chest pain or palpitations  GASTROINTESTINAL: No abdominal or epigastric pain. No nausea, vomiting; No diarrhea or constipation.   GENITOURINARY: No dysuria, frequency or hematuria  NEUROLOGICAL: No numbness or weakness  SKIN: No itching, burning, rashes, or lesions     PHYSICAL EXAM:  Vital Signs Last 24 Hrs  T(C): 36.6 (25 Jun 2023 08:50), Max: 36.6 (25 Jun 2023 08:50)  T(F): 97.9 (25 Jun 2023 08:50), Max: 97.9 (25 Jun 2023 08:50)  HR: 78 (25 Jun 2023 16:32) (74 - 90)  BP: 140/52 (25 Jun 2023 16:32) (127/53 - 140/52)  BP(mean): --  RR: 18 (25 Jun 2023 16:32) (18 - 19)  SpO2: 94% (25 Jun 2023 16:32) (88% - 95%)    Parameters below as of 25 Jun 2023 16:32  Patient On (Oxygen Delivery Method): nasal cannula  O2 Flow (L/min): 6      Constitutional: Pt lying in bed, awake and alert, NAD  HEENT: EOMI, normal hearing, moist mucous membranes  Neck: Soft and supple, no JVD  Respiratory diminish breath sound and rhonchi b/l lung, R>L   Cardiovascular: S1S2+, RRR, no M/G/R  Gastrointestinal: BS+, soft, NT/ND, no guarding, no rebound  Extremities: No peripheral edema  Vascular: 2+ peripheral pulses  Neurological: AAOx3, no focal deficits  Musculoskeletal: 5/5 strength b/l upper and lower extremities  Skin: No rashes    MEDICATIONS:  MEDICATIONS  (STANDING):  allopurinol 300 milliGRAM(s) Oral daily  apixaban 2.5 milliGRAM(s) Oral every 12 hours  azithromycin  IVPB 500 milliGRAM(s) IV Intermittent every 24 hours  budesonide  80 MICROgram(s)/formoterol 4.5 MICROgram(s) Inhaler 2 Puff(s) Inhalation two times a day  cefTRIAXone Injectable. 1000 milliGRAM(s) IV Push every 24 hours  cholecalciferol 1000 Unit(s) Oral daily  dextrose 5%. 1000 milliLiter(s) (100 mL/Hr) IV Continuous <Continuous>  dextrose 5%. 1000 milliLiter(s) (50 mL/Hr) IV Continuous <Continuous>  dextrose 50% Injectable 25 Gram(s) IV Push once  dextrose 50% Injectable 12.5 Gram(s) IV Push once  dextrose 50% Injectable 25 Gram(s) IV Push once  diltiazem    milliGRAM(s) Oral daily  gabapentin 100 milliGRAM(s) Oral at bedtime  glucagon  Injectable 1 milliGRAM(s) IntraMuscular once  insulin lispro (ADMELOG) corrective regimen sliding scale   SubCutaneous three times a day before meals  insulin lispro (ADMELOG) corrective regimen sliding scale   SubCutaneous at bedtime  mesalamine DR Capsule 400 milliGRAM(s) Oral daily  metoprolol succinate ER 25 milliGRAM(s) Oral daily  pantoprazole    Tablet 40 milliGRAM(s) Oral before breakfast  predniSONE   Tablet 40 milliGRAM(s) Oral daily  simvastatin 20 milliGRAM(s) Oral at bedtime    MEDICATIONS  (PRN):  acetaminophen     Tablet .. 650 milliGRAM(s) Oral every 6 hours PRN Temp greater or equal to 38C (100.4F), Mild Pain (1 - 3)  aluminum hydroxide/magnesium hydroxide/simethicone Suspension 30 milliLiter(s) Oral every 6 hours PRN Heartburn  calcium carbonate    500 mG (Tums) Chewable 1 Tablet(s) Chew two times a day PRN Heartburn  dextrose Oral Gel 15 Gram(s) Oral once PRN Blood Glucose LESS THAN 70 milliGRAM(s)/deciliter  dicyclomine 10 milliGRAM(s) Oral three times a day before meals PRN IBS  melatonin 3 milliGRAM(s) Oral at bedtime PRN Sleep  ondansetron   Disintegrating Tablet 4 milliGRAM(s) Oral every 8 hours PRN for nausea      LABS: All Labs Reviewed:                        8.6    21.90 )-----------( 291      ( 25 Jun 2023 11:24 )             29.4     06-25    134<L>  |  106  |  21  ----------------------------<  286<H>  4.7   |  23  |  1.11    Ca    8.9      25 Jun 2023 11:24    TPro  7.2  /  Alb  3.2<L>  /  TBili  0.3  /  DBili  x   /  AST  14<L>  /  ALT  23  /  AlkPhos  81  06-25    PT/INR - ( 23 Jun 2023 21:06 )   PT: 19.9 sec;   INR: 1.71 ratio         PTT - ( 23 Jun 2023 21:06 )  PTT:33.0 sec      Blood Culture: 06-23 @ 21:06  Organism --  Gram Stain Blood -- Gram Stain --  Specimen Source .Blood Blood-Peripheral  Culture-Blood --      I&O's Summary    CAPILLARY BLOOD GLUCOSE      POCT Blood Glucose.: 233 mg/dL (25 Jun 2023 16:20)  POCT Blood Glucose.: 266 mg/dL (25 Jun 2023 12:16)  POCT Blood Glucose.: 191 mg/dL (25 Jun 2023 08:37)  POCT Blood Glucose.: 189 mg/dL (24 Jun 2023 21:19)      RADIOLOGY/EKG:          ACC: [id number] EXAM: CT CHEST ORDERED BY: TJ BARBOUR    PROCEDURE DATE: 06/23/2023        INTERPRETATION: CLINICAL INFORMATION: Shortness of breath and fever.    COMPARISON: CT chest 11/15/2022    CONTRAST/COMPLICATIONS:  IV Contrast: NONE  Oral Contrast: NONE  Complications: None reported at time of study completion    PROCEDURE:  CT Angiography of the Chest.  Sagittal and coronal reformats were performed as well as 3D (MIP) reconstructions.    FINDINGS:    LUNGS AND AIRWAYS: Patent central airways.  6 mm right upper lobe solid nodule (4:85) is unchanged from 11/15/2022.  There is bibasilar bronchial thickening and impaction of the airways more prominent on the right. There is mild associated bibasilar atelectasis. There are scattered small subcentimeter groundglass nodules most prominent in the right upper lobe.  PLEURA: No pleural effusion.  MEDIASTINUM AND MADISON: No lymphadenopathy.  VESSELS: Within normal limits.  HEART: Heart size is normal. No pericardial effusion. Coronary artery and valvular calcifications.  CHEST WALL AND LOWER NECK: Bilateral thyroid nodules. Nonemergent thyroid ultrasound is recommended.  VISUALIZED UPPER ABDOMEN: Within normal limits.  BONES: Degenerative changes.    IMPRESSION:    There is bibasilar bronchial thickening and impaction of the airways more prominent on the right. There is mild associated bibasilar atelectasis. There are scattered small subcentimeter groundglass nodules most prominent in the right upper lobe. Findings may represent infection. Correlate clinically. Recommend follow-up.    A discrete solid 6 mm right upper lobe nodule is unchanged since 15 2022. Continued follow-up is recommended.    Bilateral thyroid nodules. Nonemergent thyroid ultrasound is recommended.  
Subjective:    pat better, on 6lpm nc sat 91%, no respiratory complaint, c/o stomach upset & difficult to sleep.    Home Medications:  allopurinol 300 mg oral tablet: 1 tab(s) orally once a day (15 Nov 2022 09:50)  Apriso 0.375 g oral capsule, extended release: 4 cap(s) orally once a day (15 Nov 2022 09:50)  Cartia  mg/24 hours oral capsule, extended release: 1 cap(s) orally once a day (15 Nov 2022 09:50)  CoQ10: orally once a day (15 Nov 2022 09:50)  dicyclomine 10 mg oral capsule: 1 cap(s) orally 3 times a day, As Needed (15 Nov 2022 09:50)  Eliquis 5 mg oral tablet: 1 tab(s) orally 2 times a day  Resume at 11pm tonight (8/19/20) (15 Nov 2022 09:50)  furosemide 20 mg oral tablet: 1 tab(s) orally once a day (15 Nov 2022 09:50)  gabapentin 100 mg oral tablet: 1 tab(s) orally once a day (at bedtime) (15 Nov 2022 09:50)  metFORMIN 500 mg oral tablet: 2 tab(s) orally once a day (in the morning) (15 Nov 2022 09:50)  metFORMIN 500 mg oral tablet: 1 tab(s) orally once (at bedtime) (15 Nov 2022 09:50)  Metoprolol Succinate ER 25 mg oral tablet, extended release: 1 tab(s) orally once a day (15 Nov 2022 09:50)  pantoprazole 40 mg oral delayed release tablet: 1 tab(s) orally once a day (15 Nov 2022 09:50)  Probiotic Formula oral capsule: 1 cap(s) orally 2 times a day (15 Nov 2022 09:50)  Prolia 60 mg/mL subcutaneous solution: subcutaneous every 6 months (15 Nov 2022 09:50)  simvastatin 20 mg oral tablet: 1 tab(s) orally once a day (at bedtime) (15 Nov 2022 09:50)  Trelegy Ellipta 100 mcg-62.5 mcg-25 mcg/inh inhalation powder: 1 puff(s) inhaled once a day (15 Nov 2022 09:50)  Vitamin D3 25 mcg (1000 intl units) oral tablet: 1 tab(s) orally once a day (15 Nov 2022 09:50)  zolpidem 5 mg oral tablet: 1 tab(s) orally once a day (at bedtime), As Needed (15 Nov 2022 09:50)    MEDICATIONS  (STANDING):  allopurinol 300 milliGRAM(s) Oral daily  apixaban 2.5 milliGRAM(s) Oral every 12 hours  azithromycin  IVPB 500 milliGRAM(s) IV Intermittent every 24 hours  budesonide  80 MICROgram(s)/formoterol 4.5 MICROgram(s) Inhaler 2 Puff(s) Inhalation two times a day  cefTRIAXone Injectable. 1000 milliGRAM(s) IV Push every 24 hours  cholecalciferol 1000 Unit(s) Oral daily  dextrose 5%. 1000 milliLiter(s) (100 mL/Hr) IV Continuous <Continuous>  dextrose 5%. 1000 milliLiter(s) (50 mL/Hr) IV Continuous <Continuous>  dextrose 50% Injectable 25 Gram(s) IV Push once  dextrose 50% Injectable 12.5 Gram(s) IV Push once  dextrose 50% Injectable 25 Gram(s) IV Push once  diltiazem    milliGRAM(s) Oral daily  gabapentin 100 milliGRAM(s) Oral at bedtime  glucagon  Injectable 1 milliGRAM(s) IntraMuscular once  insulin lispro (ADMELOG) corrective regimen sliding scale   SubCutaneous at bedtime  insulin lispro (ADMELOG) corrective regimen sliding scale   SubCutaneous three times a day before meals  mesalamine DR Capsule 400 milliGRAM(s) Oral daily  metoprolol succinate ER 25 milliGRAM(s) Oral daily  pantoprazole    Tablet 40 milliGRAM(s) Oral before breakfast  predniSONE   Tablet 40 milliGRAM(s) Oral daily  simvastatin 20 milliGRAM(s) Oral at bedtime    MEDICATIONS  (PRN):  acetaminophen     Tablet .. 650 milliGRAM(s) Oral every 6 hours PRN Temp greater or equal to 38C (100.4F), Mild Pain (1 - 3)  aluminum hydroxide/magnesium hydroxide/simethicone Suspension 30 milliLiter(s) Oral every 6 hours PRN Heartburn  calcium carbonate    500 mG (Tums) Chewable 1 Tablet(s) Chew two times a day PRN Heartburn  dextrose Oral Gel 15 Gram(s) Oral once PRN Blood Glucose LESS THAN 70 milliGRAM(s)/deciliter  dicyclomine 10 milliGRAM(s) Oral three times a day before meals PRN IBS  melatonin 3 milliGRAM(s) Oral at bedtime PRN Sleep  ondansetron   Disintegrating Tablet 4 milliGRAM(s) Oral every 8 hours PRN for nausea      Allergies    aspirin (Stomach Upset)    Intolerances        Vital Signs Last 24 Hrs  T(C): 36.6 (25 Jun 2023 08:50), Max: 36.6 (25 Jun 2023 08:50)  T(F): 97.9 (25 Jun 2023 08:50), Max: 97.9 (25 Jun 2023 08:50)  HR: 75 (25 Jun 2023 08:50) (74 - 90)  BP: 128/45 (25 Jun 2023 08:50) (125/62 - 128/45)  BP(mean): --  RR: 18 (25 Jun 2023 08:50) (18 - 20)  SpO2: 91% (25 Jun 2023 08:50) (88% - 95%)    Parameters below as of 25 Jun 2023 08:50  Patient On (Oxygen Delivery Method): nasal cannula  O2 Flow (L/min): 6        PHYSICAL EXAMINATION:    NECK:  Supple. No lymphadenopathy. Jugular venous pressure not elevated. Carotids equal.   HEART:   The cardiac impulse has a normal quality. Reg., Nl S1 and S2.  There are no murmurs, rubs or gallops noted  CHEST:  Chest rhonchi to auscultation. Normal respiratory effort.  ABDOMEN:  Soft and nontender.   EXTREMITIES:  There is no edema.       LABS:                        8.5    16.60 )-----------( 312      ( 24 Jun 2023 11:32 )             28.7     06-24    136  |  108  |  22  ----------------------------<  241<H>  4.5   |  22  |  1.27    Ca    8.5      24 Jun 2023 11:32    TPro  7.2  /  Alb  3.2<L>  /  TBili  0.4  /  DBili  x   /  AST  12<L>  /  ALT  20  /  AlkPhos  76  06-24    PT/INR - ( 23 Jun 2023 21:06 )   PT: 19.9 sec;   INR: 1.71 ratio         PTT - ( 23 Jun 2023 21:06 )  PTT:33.0 sec  Urinalysis Basic - ( 24 Jun 2023 11:32 )    Color: x / Appearance: x / SG: x / pH: x  Gluc: 241 mg/dL / Ketone: x  / Bili: x / Urobili: x   Blood: x / Protein: x / Nitrite: x   Leuk Esterase: x / RBC: x / WBC x   Sq Epi: x / Non Sq Epi: x / Bacteria: x

## 2023-06-27 NOTE — DISCHARGE NOTE NURSING/CASE MANAGEMENT/SOCIAL WORK - CASE MANAGER'S NAME
How Severe Is Your Skin Lesion?: mild
Has Your Skin Lesion Been Treated?: not been treated
Is This A New Presentation, Or A Follow-Up?: Skin Lesion
Sue Patterson R.N.

## 2023-06-27 NOTE — DISCHARGE NOTE PROVIDER - PROVIDER TOKENS
PROVIDER:[TOKEN:[[id number]],FOLLOWUP:[1-3 days],ESTABLISHEDPATIENT:[T]],PROVIDER:[TOKEN:[[id number]],FOLLOWUP:[1 week],ESTABLISHEDPATIENT:[T]]

## 2023-06-27 NOTE — DISCHARGE NOTE PROVIDER - CARE PROVIDER_API CALL
Stew Anton  Family Medicine  [address] [place] [id number]  Phone: [phone]  Fax: [phone]  Established Patient  Follow Up Time: 1-3 days    Brooks Gutierrez  Pulmonary Disease  [address] [place] [address]  Phone: [phone]  Fax: [phone]  Established Patient  Follow Up Time: 1 week

## 2023-06-28 ENCOUNTER — TRANSCRIPTION ENCOUNTER (OUTPATIENT)
Age: 83
End: 2023-06-28

## 2023-06-29 LAB
CULTURE RESULTS: SIGNIFICANT CHANGE UP
CULTURE RESULTS: SIGNIFICANT CHANGE UP
SPECIMEN SOURCE: SIGNIFICANT CHANGE UP
SPECIMEN SOURCE: SIGNIFICANT CHANGE UP

## 2023-06-30 ENCOUNTER — TRANSCRIPTION ENCOUNTER (OUTPATIENT)
Age: 83
End: 2023-06-30

## 2023-06-30 DIAGNOSIS — E78.5 HYPERLIPIDEMIA, UNSPECIFIED: ICD-10-CM

## 2023-06-30 DIAGNOSIS — E04.2 NONTOXIC MULTINODULAR GOITER: ICD-10-CM

## 2023-06-30 DIAGNOSIS — G47.33 OBSTRUCTIVE SLEEP APNEA (ADULT) (PEDIATRIC): ICD-10-CM

## 2023-06-30 DIAGNOSIS — E11.9 TYPE 2 DIABETES MELLITUS WITHOUT COMPLICATIONS: ICD-10-CM

## 2023-06-30 DIAGNOSIS — J44.0 CHRONIC OBSTRUCTIVE PULMONARY DISEASE WITH ACUTE LOWER RESPIRATORY INFECTION: ICD-10-CM

## 2023-06-30 DIAGNOSIS — I48.0 PAROXYSMAL ATRIAL FIBRILLATION: ICD-10-CM

## 2023-06-30 DIAGNOSIS — Z79.84 LONG TERM (CURRENT) USE OF ORAL HYPOGLYCEMIC DRUGS: ICD-10-CM

## 2023-06-30 DIAGNOSIS — Z99.81 DEPENDENCE ON SUPPLEMENTAL OXYGEN: ICD-10-CM

## 2023-06-30 DIAGNOSIS — G47.00 INSOMNIA, UNSPECIFIED: ICD-10-CM

## 2023-06-30 DIAGNOSIS — J18.9 PNEUMONIA, UNSPECIFIED ORGANISM: ICD-10-CM

## 2023-06-30 DIAGNOSIS — N17.9 ACUTE KIDNEY FAILURE, UNSPECIFIED: ICD-10-CM

## 2023-06-30 DIAGNOSIS — E87.20 ACIDOSIS, UNSPECIFIED: ICD-10-CM

## 2023-06-30 DIAGNOSIS — J96.01 ACUTE RESPIRATORY FAILURE WITH HYPOXIA: ICD-10-CM

## 2023-06-30 DIAGNOSIS — A41.9 SEPSIS, UNSPECIFIED ORGANISM: ICD-10-CM

## 2023-06-30 DIAGNOSIS — R06.02 SHORTNESS OF BREATH: ICD-10-CM

## 2023-06-30 DIAGNOSIS — K21.9 GASTRO-ESOPHAGEAL REFLUX DISEASE WITHOUT ESOPHAGITIS: ICD-10-CM

## 2023-06-30 DIAGNOSIS — Z79.01 LONG TERM (CURRENT) USE OF ANTICOAGULANTS: ICD-10-CM

## 2023-06-30 DIAGNOSIS — J44.1 CHRONIC OBSTRUCTIVE PULMONARY DISEASE WITH (ACUTE) EXACERBATION: ICD-10-CM

## 2023-07-06 ENCOUNTER — TRANSCRIPTION ENCOUNTER (OUTPATIENT)
Age: 83
End: 2023-07-06

## 2023-07-06 PROBLEM — E11.9 DIABETES MELLITUS, TYPE 2: Status: ACTIVE | Noted: 2023-07-06

## 2023-07-06 PROBLEM — J44.9 COPD (CHRONIC OBSTRUCTIVE PULMONARY DISEASE): Status: ACTIVE | Noted: 2023-07-06

## 2023-07-06 PROBLEM — I48.0 PAROXYSMAL ATRIAL FIBRILLATION: Status: ACTIVE | Noted: 2023-07-06

## 2023-07-06 RX ORDER — AMOXICILLIN 500 MG/1
500 CAPSULE ORAL
Qty: 30 | Refills: 0 | Status: DISCONTINUED | COMMUNITY
Start: 2022-04-19 | End: 2023-07-06

## 2023-07-06 RX ORDER — FUROSEMIDE 20 MG/1
20 TABLET ORAL
Qty: 90 | Refills: 0 | Status: DISCONTINUED | COMMUNITY
Start: 2021-07-19 | End: 2023-07-06

## 2023-07-06 RX ORDER — CHOLECALCIFEROL (VITAMIN D3) 25 MCG
25 MCG TABLET ORAL DAILY
Refills: 0 | Status: ACTIVE | COMMUNITY
Start: 2023-07-06

## 2023-07-06 RX ORDER — APIXABAN 5 MG/1
5 TABLET, FILM COATED ORAL
Refills: 0 | Status: ACTIVE | COMMUNITY
Start: 2022-02-16

## 2023-07-06 RX ORDER — FLUTICASONE FUROATE, UMECLIDINIUM BROMIDE AND VILANTEROL TRIFENATATE 100; 62.5; 25 UG/1; UG/1; UG/1
100-62.5-25 POWDER RESPIRATORY (INHALATION)
Refills: 0 | Status: ACTIVE | COMMUNITY
Start: 2022-02-22

## 2023-07-06 RX ORDER — DILTIAZEM HYDROCHLORIDE 240 MG/1
240 CAPSULE, EXTENDED RELEASE ORAL
Refills: 0 | Status: ACTIVE | COMMUNITY
Start: 2021-12-14

## 2023-07-06 RX ORDER — AMOXICILLIN AND CLAVULANATE POTASSIUM 875; 125 MG/1; MG/1
875-125 TABLET, COATED ORAL
Qty: 14 | Refills: 0 | Status: DISCONTINUED | COMMUNITY
Start: 2022-02-18 | End: 2023-07-06

## 2023-07-06 RX ORDER — GABAPENTIN 100 MG/1
100 CAPSULE ORAL
Qty: 90 | Refills: 0 | Status: DISCONTINUED | COMMUNITY
Start: 2022-03-14 | End: 2023-07-06

## 2023-07-06 RX ORDER — METOPROLOL SUCCINATE 25 MG/1
25 TABLET, EXTENDED RELEASE ORAL
Refills: 0 | Status: ACTIVE | COMMUNITY
Start: 2021-12-14

## 2023-07-06 RX ORDER — AMOXICILLIN 500 MG/1
500 TABLET, FILM COATED ORAL
Qty: 21 | Refills: 0 | Status: DISCONTINUED | COMMUNITY
Start: 2022-03-07 | End: 2023-07-06

## 2023-07-06 RX ORDER — PANTOPRAZOLE 40 MG/1
40 TABLET, DELAYED RELEASE ORAL
Qty: 90 | Refills: 0 | Status: DISCONTINUED | COMMUNITY
Start: 2021-12-14 | End: 2023-07-06

## 2023-07-07 ENCOUNTER — APPOINTMENT (OUTPATIENT)
Dept: CARE COORDINATION | Facility: HOME HEALTH | Age: 83
End: 2023-07-07
Payer: MEDICARE

## 2023-07-07 VITALS
OXYGEN SATURATION: 94 % | HEART RATE: 85 BPM | DIASTOLIC BLOOD PRESSURE: 60 MMHG | SYSTOLIC BLOOD PRESSURE: 138 MMHG | RESPIRATION RATE: 18 BRPM

## 2023-07-07 DIAGNOSIS — J96.11 CHRONIC RESPIRATORY FAILURE WITH HYPOXIA: ICD-10-CM

## 2023-07-07 DIAGNOSIS — I10 ESSENTIAL (PRIMARY) HYPERTENSION: ICD-10-CM

## 2023-07-07 DIAGNOSIS — E11.9 TYPE 2 DIABETES MELLITUS W/OUT COMPLICATIONS: ICD-10-CM

## 2023-07-07 DIAGNOSIS — Z99.81 CHRONIC RESPIRATORY FAILURE WITH HYPOXIA: ICD-10-CM

## 2023-07-07 DIAGNOSIS — J18.9 PNEUMONIA, UNSPECIFIED ORGANISM: ICD-10-CM

## 2023-07-07 DIAGNOSIS — I48.0 PAROXYSMAL ATRIAL FIBRILLATION: ICD-10-CM

## 2023-07-07 DIAGNOSIS — J44.9 CHRONIC OBSTRUCTIVE PULMONARY DISEASE, UNSPECIFIED: ICD-10-CM

## 2023-07-07 PROCEDURE — 99495 TRANSJ CARE MGMT MOD F2F 14D: CPT

## 2023-07-07 NOTE — HISTORY OF PRESENT ILLNESS
[Post-hospitalization from ___ Hospital] : Post-hospitalization from [unfilled] Hospital [Admitted on: ___] : The patient was admitted on [unfilled] [Discharged on ___] : discharged on [unfilled] [Discharge Summary] : discharge summary [Discharge Med List] : discharge medication list [Med Reconciliation] : medication reconciliation has been completed [Patient Contacted By: ____] : and contacted by [unfilled] [FreeTextEntry2] : Hospital Course	\par Pt is a 81 y/o female w/PMHx of HTN, HLD, DM, Paroxysmal Afib, ANU, COPD on 6L O2 at home, presents for SOB. Of note, pt was at ED on 6/22 fo left arm fracture, and discharged with sling, but since that point, was c/o worsening SOB, no alleviating/aggravating sxs, associated with chills, possible fever, and some cough, causing her to present to . In the ED, pt was found to be febrile with Tmax at 102, w/HR at 103, RR at 30, and hypoxic as well, requiring venti mask on 15 L. \par \par Pt is admitted for acute sepsis and acute hypoxic respiratory failure 2/2 CAP. Pulmonology was consulted. Pt was put on steroid and antibiotics : ceftriaxone and azithromycin. Blood culture negative to date. Pt will be discharged on steroid taper and 3 more days of antibiotics :  ceftin and azithromycin. Pt will need to follow up with her pulmonologist and primary care provider for further eval and management.\par \par CC:\par Pt is seen at home s/p recent admission for PNA. Pt is temporarily unable to leave home as it requires a considerable and taxing effort, exhibits unsteady gait and needs assistive device to leave home.\par HPI:\par Pt Is a 81 y/o female enrolled in the [facility] seen at home s/p a recent admission for PNA. Pt was contacted by TCM RN on 6/28 and med rec was done within 48 hours of DC.\par \par Upon examination A&O x 3 NAD. Friend Clari present for visit. Denies CP, SOB, headache, dizziness, pain, abd discomfort or difficulty with bowel or bladder. S/P mechanical fall 2 days prior to admission sustained fractured L humerus, arm in sling. Under the care of Dr. Roth. PNA: completed the azitho/ceftin/prednisone on dc. COPD with chronic resp failure, no signs exac. Pafib: RRR on exam, eliquis. HTN: BP controlled on current meds. DM: metformin, diabetic diet. [facility] services in home\par \par

## 2023-07-07 NOTE — PLAN
[FreeTextEntry1] : A/P:\par s/p hospitalization for Sepsis 2/2 CAP, completed all ABT and prednisone taper on dc\par \par # COPD with chronic respiratory failure:\par - con't O2 6L NC continuous\par - con't trelegy daily\par - call for any SOB/wheeze/cough/fever\par - avoid sick contacts, good handwashing\par - f/u Dr. Gutierrez as scheduled and prn\par \par # PAfib:\par - RRR on exam, rate controlled\par - con;t eliquis, CCB, BB\par \par # HTN:\par - BP controlled on current meds\par - con't CCB, BB\par \par # DM2:\par - controlled, A1c 6.4\par - con't metformin, diabetic diet\par - management per PCP\par - f/u Dr. Anton on 7/10

## 2023-07-07 NOTE — PHYSICAL EXAM
[No Acute Distress] : no acute distress [Well Nourished] : well nourished [Normal Sclera/Conjunctiva] : normal sclera/conjunctiva [Normal Outer Ear/Nose] : the outer ears and nose were normal in appearance [Normal Oropharynx] : the oropharynx was normal [Supple] : supple [No Respiratory Distress] : no respiratory distress  [Clear to Auscultation] : lungs were clear to auscultation bilaterally [No Accessory Muscle Use] : no accessory muscle use [Normal Rate] : normal rate  [Regular Rhythm] : with a regular rhythm [Normal S1, S2] : normal S1 and S2 [Pedal Pulses Present] : the pedal pulses are present [No Edema] : there was no peripheral edema [No Extremity Clubbing/Cyanosis] : no extremity clubbing/cyanosis [Soft] : abdomen soft [Non Tender] : non-tender [Non-distended] : non-distended [Normal Bowel Sounds] : normal bowel sounds [No Spinal Tenderness] : no spinal tenderness [Grossly Normal Strength/Tone] : grossly normal strength/tone [No Rash] : no rash [Normal Gait] : normal gait [Coordination Grossly Intact] : coordination grossly intact [No Focal Deficits] : no focal deficits [Normal Affect] : the affect was normal [Alert and Oriented x3] : oriented to person, place, and time [de-identified] : no thrush

## 2023-07-13 ENCOUNTER — TRANSCRIPTION ENCOUNTER (OUTPATIENT)
Age: 83
End: 2023-07-13

## 2023-07-24 ENCOUNTER — TRANSCRIPTION ENCOUNTER (OUTPATIENT)
Age: 83
End: 2023-07-24

## 2023-08-04 ENCOUNTER — INPATIENT (INPATIENT)
Facility: HOSPITAL | Age: 83
LOS: 5 days | Discharge: NURSING, REHAB OR LONG-TERM CARE | DRG: 521 | End: 2023-08-10
Attending: STUDENT IN AN ORGANIZED HEALTH CARE EDUCATION/TRAINING PROGRAM | Admitting: INTERNAL MEDICINE
Payer: MEDICARE

## 2023-08-04 VITALS
SYSTOLIC BLOOD PRESSURE: 141 MMHG | OXYGEN SATURATION: 91 % | DIASTOLIC BLOOD PRESSURE: 51 MMHG | HEIGHT: 67 IN | HEART RATE: 85 BPM | TEMPERATURE: 98 F | RESPIRATION RATE: 18 BRPM

## 2023-08-04 DIAGNOSIS — Z90.49 ACQUIRED ABSENCE OF OTHER SPECIFIED PARTS OF DIGESTIVE TRACT: Chronic | ICD-10-CM

## 2023-08-04 LAB
ALBUMIN SERPL ELPH-MCNC: 3.2 G/DL — LOW (ref 3.3–5)
ALP SERPL-CCNC: 78 U/L — SIGNIFICANT CHANGE UP (ref 40–120)
ALT FLD-CCNC: 17 U/L — SIGNIFICANT CHANGE UP (ref 12–78)
ANION GAP SERPL CALC-SCNC: 15 MMOL/L — SIGNIFICANT CHANGE UP (ref 5–17)
ANISOCYTOSIS BLD QL: SLIGHT — SIGNIFICANT CHANGE UP
AST SERPL-CCNC: 18 U/L — SIGNIFICANT CHANGE UP (ref 15–37)
BASOPHILS # BLD AUTO: 0.03 K/UL — SIGNIFICANT CHANGE UP (ref 0–0.2)
BASOPHILS NFR BLD AUTO: 0.3 % — SIGNIFICANT CHANGE UP (ref 0–2)
BILIRUB SERPL-MCNC: 0.3 MG/DL — SIGNIFICANT CHANGE UP (ref 0.2–1.2)
BUN SERPL-MCNC: 13 MG/DL — SIGNIFICANT CHANGE UP (ref 7–23)
CALCIUM SERPL-MCNC: 8.9 MG/DL — SIGNIFICANT CHANGE UP (ref 8.5–10.1)
CHLORIDE SERPL-SCNC: 107 MMOL/L — SIGNIFICANT CHANGE UP (ref 96–108)
CO2 SERPL-SCNC: 20 MMOL/L — LOW (ref 22–31)
CREAT SERPL-MCNC: 1.15 MG/DL — SIGNIFICANT CHANGE UP (ref 0.5–1.3)
EGFR: 47 ML/MIN/1.73M2 — LOW
EOSINOPHIL # BLD AUTO: 0 K/UL — SIGNIFICANT CHANGE UP (ref 0–0.5)
EOSINOPHIL NFR BLD AUTO: 0 % — SIGNIFICANT CHANGE UP (ref 0–6)
GLUCOSE SERPL-MCNC: 115 MG/DL — HIGH (ref 70–99)
HCT VFR BLD CALC: 33.5 % — LOW (ref 34.5–45)
HGB BLD-MCNC: 10.4 G/DL — LOW (ref 11.5–15.5)
IMM GRANULOCYTES NFR BLD AUTO: 0.8 % — SIGNIFICANT CHANGE UP (ref 0–0.9)
LIDOCAIN IGE QN: 114 U/L — SIGNIFICANT CHANGE UP (ref 73–393)
LYMPHOCYTES # BLD AUTO: 1.14 K/UL — SIGNIFICANT CHANGE UP (ref 1–3.3)
LYMPHOCYTES # BLD AUTO: 10.8 % — LOW (ref 13–44)
MAGNESIUM SERPL-MCNC: 1.7 MG/DL — SIGNIFICANT CHANGE UP (ref 1.6–2.6)
MANUAL SMEAR VERIFICATION: SIGNIFICANT CHANGE UP
MCHC RBC-ENTMCNC: 24.4 PG — LOW (ref 27–34)
MCHC RBC-ENTMCNC: 31 GM/DL — LOW (ref 32–36)
MCV RBC AUTO: 78.5 FL — LOW (ref 80–100)
MICROCYTES BLD QL: SLIGHT — SIGNIFICANT CHANGE UP
MONOCYTES # BLD AUTO: 0.54 K/UL — SIGNIFICANT CHANGE UP (ref 0–0.9)
MONOCYTES NFR BLD AUTO: 5.1 % — SIGNIFICANT CHANGE UP (ref 2–14)
NEUTROPHILS # BLD AUTO: 8.79 K/UL — HIGH (ref 1.8–7.4)
NEUTROPHILS NFR BLD AUTO: 83 % — HIGH (ref 43–77)
OVALOCYTES BLD QL SMEAR: SLIGHT — SIGNIFICANT CHANGE UP
PLAT MORPH BLD: NORMAL — SIGNIFICANT CHANGE UP
PLATELET # BLD AUTO: 310 K/UL — SIGNIFICANT CHANGE UP (ref 150–400)
POTASSIUM SERPL-MCNC: 3.8 MMOL/L — SIGNIFICANT CHANGE UP (ref 3.5–5.3)
POTASSIUM SERPL-SCNC: 3.8 MMOL/L — SIGNIFICANT CHANGE UP (ref 3.5–5.3)
PROT SERPL-MCNC: 6.8 GM/DL — SIGNIFICANT CHANGE UP (ref 6–8.3)
RBC # BLD: 4.27 M/UL — SIGNIFICANT CHANGE UP (ref 3.8–5.2)
RBC # FLD: 23.8 % — HIGH (ref 10.3–14.5)
RBC BLD AUTO: ABNORMAL
SODIUM SERPL-SCNC: 142 MMOL/L — SIGNIFICANT CHANGE UP (ref 135–145)
TROPONIN I, HIGH SENSITIVITY RESULT: 7.48 NG/L — SIGNIFICANT CHANGE UP
WBC # BLD: 10.58 K/UL — HIGH (ref 3.8–10.5)
WBC # FLD AUTO: 10.58 K/UL — HIGH (ref 3.8–10.5)

## 2023-08-04 PROCEDURE — 71250 CT THORAX DX C-: CPT | Mod: 26,MD

## 2023-08-04 PROCEDURE — 70450 CT HEAD/BRAIN W/O DYE: CPT | Mod: 26,MD

## 2023-08-04 PROCEDURE — 73562 X-RAY EXAM OF KNEE 3: CPT | Mod: 26,LT

## 2023-08-04 PROCEDURE — 99285 EMERGENCY DEPT VISIT HI MDM: CPT

## 2023-08-04 PROCEDURE — 72125 CT NECK SPINE W/O DYE: CPT | Mod: 26,MD

## 2023-08-04 PROCEDURE — 73523 X-RAY EXAM HIPS BI 5/> VIEWS: CPT | Mod: 26

## 2023-08-04 PROCEDURE — 71045 X-RAY EXAM CHEST 1 VIEW: CPT | Mod: 26

## 2023-08-04 PROCEDURE — 73552 X-RAY EXAM OF FEMUR 2/>: CPT | Mod: 26,LT

## 2023-08-04 PROCEDURE — 74176 CT ABD & PELVIS W/O CONTRAST: CPT | Mod: 26,MD

## 2023-08-04 RX ORDER — MORPHINE SULFATE 50 MG/1
2 CAPSULE, EXTENDED RELEASE ORAL ONCE
Refills: 0 | Status: DISCONTINUED | OUTPATIENT
Start: 2023-08-04 | End: 2023-08-04

## 2023-08-04 RX ADMIN — MORPHINE SULFATE 2 MILLIGRAM(S): 50 CAPSULE, EXTENDED RELEASE ORAL at 22:18

## 2023-08-04 NOTE — ED ADULT NURSE NOTE - CHIEF COMPLAINT QUOTE
pt bibems from home with daughter s/p trip and fall onto L hip. -head strike -loc on eliquis pmh afib, DM, HTN, COPD on 6L NC at home.

## 2023-08-04 NOTE — ED ADULT NURSE NOTE - NSFALLHARMRISKINTERV_ED_ALL_ED
Communicate risk of Fall with Harm to all staff, patient, and family/Provide patient with walking aids/Provide visual cue: red socks, yellow wristband, yellow gown, etc/Reinforce activity limits and safety measures with patient and family/Bed in lowest position, wheels locked, appropriate side rails in place/Call bell, personal items and telephone in reach/Instruct patient to call for assistance before getting out of bed/chair/stretcher/Non-slip footwear applied when patient is off stretcher/Ven[place] to call system/Physically safe environment - no spills, clutter or unnecessary equipment/Purposeful Proactive Rounding/Room/bathroom lighting operational, light cord in reach

## 2023-08-04 NOTE — ED PROVIDER NOTE - DIFFERENTIAL DIAGNOSIS
fall, hip pain, AMS, CT, labs, r/o fx. Signed out the care of the patient to Dr. Berger. Differential Diagnosis

## 2023-08-04 NOTE — ED PROVIDER NOTE - NS_ ATTENDINGSCRIBEDETAILS _ED_A_ED_FT
I Tolu Charles MD saw and examined the patient. Scribe documented for me and under my supervision. I have modified the scribe's documentation where necessary to reflect my history, physical exam and other relevant documentations pertinent to the care of the patient.

## 2023-08-04 NOTE — ED ADULT TRIAGE NOTE - CHIEF COMPLAINT QUOTE
pt bibems from home with daughter s/p trip and fall onto L hip. -head strike -loc on eliquis [facility] afib, DM, HTN pt bibems from home with daughter s/p trip and fall onto L hip. -head strike -loc on eliquis pmh afib, DM, HTN, COPD on 6L NC at home.

## 2023-08-04 NOTE — ED PROVIDER NOTE - MUSCULOSKELETAL NEGATIVE STATEMENT, MLM
no back pain, no gout, no musculoskeletal pain, no neck pain, and no weakness. hip and lower back pain

## 2023-08-04 NOTE — ED PROVIDER NOTE - PROGRESS NOTE DETAILS
Melvin MORA: Patient's care signed out to Dr. Jurado. Patient with 5 days of progressively worsening AMS, difficulty with speech, son not happy with care at his resident. Melvin MORA: s/p juan luis, r/o fx

## 2023-08-04 NOTE — ED PROVIDER NOTE - OBJECTIVE STATEMENT
81 y/o female w/PMHx of HTN, HLD, DM, ANU, GERD, and appendectomy presents to the ED BIBEMS from home with daughter s/p trip and fall onto L hip. -head strike -loc on Eliquis. 81 y/o female w/PMHx of HTN, HLD, DM, ANU, GERD, and appendectomy presents to the ED BIBEMS from home with daughter s/p trip and fall onto L hip. -head strike -loc on Eliquis. Pt recently broke humerus June 22nd. No CP, no ABD pain, no lightheadedness, no dizziness. was one step up from the kitchen when they possibly caught their shoe on the step.

## 2023-08-04 NOTE — ED PROVIDER NOTE - EKG ADDITIONAL QUESTION - PERFORMED INDEPENDENT VISUALIZATION
CM/ERIC Rounding Notes    Current Status:     Clinical barriers to Discharge/Transition: Medical necessity for acute care, Rehab plan of care obstacle        Recommendations (response to barriers): Continue current plan, Collaborate with provider, Collaborate with family/SO, Facilitate access to additional services/equipment  Recommended Referrals: Occupational Therapy (O), Physical Therapy (O)      Prior Admit Status:   Living Situation: Spouse/significant other and residing at House.   Support Systems: Spouse/Significant other, Family members  Home Devices/Equipment: Hospital bed, Home Oxygen (T), Mobility assist device  Mobility Assist Devices: Total lift  Type of Service Prior to Hospitalization: Hospice, Home health aide    Patient/Family Discharge Goal:   Home Care       Therapy Recommendation for Discharge:   PT: Patient requires 24 HOUR assistance to perform mobility and/or ADLs safely   OT: Patient requires 24 HOUR assistance to perform mobility and/or ADLs safely, Patient is appropriate for Occupational Therapy 1-3 times per week   SLP:    Recommended Mobility Equipment for Discharge: Owns Pramod, hospital bed, and wheelchair    Discharge Plan/Needs:   Recommendations for Discharge: SW/CM: Home care  Planned Discharge Destination: Rehabilitation/Skilled Care       Devices/ Equipment that need to be arranged for discharge: None at this time    Per RN patient no longer wants hospice care at home. She would like to go to rehab to get stronger at discharge. Discussed case with MD. Will look for PT/OT recommendation.     Yes

## 2023-08-04 NOTE — ED PROVIDER NOTE - MUSCULOSKELETAL, MLM
Spine appears normal, TTP L hip. Spine appears normal, TTP L hip. Unable to fully flex and extend at the left hip.

## 2023-08-04 NOTE — ED ADULT NURSE NOTE - OBJECTIVE STATEMENT
82y female  presented to the ED with complaints of fall. Pt fell earlier today and was not able to get up, Pt has left hip pain. -loc, -head strike. Pt has history of left fractured humerus from 4 weeks ago. History of COPD, pt on 6L oxygen at home. on eliquis,.

## 2023-08-05 ENCOUNTER — TRANSCRIPTION ENCOUNTER (OUTPATIENT)
Age: 83
End: 2023-08-05

## 2023-08-05 ENCOUNTER — RESULT REVIEW (OUTPATIENT)
Age: 83
End: 2023-08-05

## 2023-08-05 DIAGNOSIS — S72.002A FRACTURE OF UNSPECIFIED PART OF NECK OF LEFT FEMUR, INITIAL ENCOUNTER FOR CLOSED FRACTURE: ICD-10-CM

## 2023-08-05 LAB
ANION GAP SERPL CALC-SCNC: 10 MMOL/L — SIGNIFICANT CHANGE UP (ref 5–17)
ANION GAP SERPL CALC-SCNC: 5 MMOL/L — SIGNIFICANT CHANGE UP (ref 5–17)
APPEARANCE UR: CLEAR — SIGNIFICANT CHANGE UP
APTT BLD: 35.7 SEC — HIGH (ref 24.5–35.6)
BILIRUB UR-MCNC: NEGATIVE — SIGNIFICANT CHANGE UP
BLD GP AB SCN SERPL QL: SIGNIFICANT CHANGE UP
BUN SERPL-MCNC: 13 MG/DL — SIGNIFICANT CHANGE UP (ref 7–23)
BUN SERPL-MCNC: 17 MG/DL — SIGNIFICANT CHANGE UP (ref 7–23)
CALCIUM SERPL-MCNC: 9 MG/DL — SIGNIFICANT CHANGE UP (ref 8.5–10.1)
CALCIUM SERPL-MCNC: 9.1 MG/DL — SIGNIFICANT CHANGE UP (ref 8.5–10.1)
CHLORIDE SERPL-SCNC: 104 MMOL/L — SIGNIFICANT CHANGE UP (ref 96–108)
CHLORIDE SERPL-SCNC: 107 MMOL/L — SIGNIFICANT CHANGE UP (ref 96–108)
CO2 SERPL-SCNC: 24 MMOL/L — SIGNIFICANT CHANGE UP (ref 22–31)
CO2 SERPL-SCNC: 27 MMOL/L — SIGNIFICANT CHANGE UP (ref 22–31)
COLOR SPEC: YELLOW — SIGNIFICANT CHANGE UP
CREAT SERPL-MCNC: 1.11 MG/DL — SIGNIFICANT CHANGE UP (ref 0.5–1.3)
CREAT SERPL-MCNC: 1.41 MG/DL — HIGH (ref 0.5–1.3)
DIFF PNL FLD: NEGATIVE — SIGNIFICANT CHANGE UP
EGFR: 37 ML/MIN/1.73M2 — LOW
EGFR: 49 ML/MIN/1.73M2 — LOW
GLUCOSE BLDC GLUCOMTR-MCNC: 263 MG/DL — HIGH (ref 70–99)
GLUCOSE SERPL-MCNC: 148 MG/DL — HIGH (ref 70–99)
GLUCOSE SERPL-MCNC: 283 MG/DL — HIGH (ref 70–99)
GLUCOSE UR QL: NEGATIVE — SIGNIFICANT CHANGE UP
HCT VFR BLD CALC: 31 % — LOW (ref 34.5–45)
HCT VFR BLD CALC: 33.6 % — LOW (ref 34.5–45)
HGB BLD-MCNC: 10.1 G/DL — LOW (ref 11.5–15.5)
HGB BLD-MCNC: 10.3 G/DL — LOW (ref 11.5–15.5)
INR BLD: 1.17 RATIO — SIGNIFICANT CHANGE UP (ref 0.85–1.18)
KETONES UR-MCNC: ABNORMAL
LEUKOCYTE ESTERASE UR-ACNC: NEGATIVE — SIGNIFICANT CHANGE UP
MCHC RBC-ENTMCNC: 24.6 PG — LOW (ref 27–34)
MCHC RBC-ENTMCNC: 25.2 PG — LOW (ref 27–34)
MCHC RBC-ENTMCNC: 30.7 GM/DL — LOW (ref 32–36)
MCHC RBC-ENTMCNC: 32.6 GM/DL — SIGNIFICANT CHANGE UP (ref 32–36)
MCV RBC AUTO: 77.3 FL — LOW (ref 80–100)
MCV RBC AUTO: 80.2 FL — SIGNIFICANT CHANGE UP (ref 80–100)
NITRITE UR-MCNC: NEGATIVE — SIGNIFICANT CHANGE UP
PH UR: 5 — SIGNIFICANT CHANGE UP (ref 5–8)
PLATELET # BLD AUTO: 273 K/UL — SIGNIFICANT CHANGE UP (ref 150–400)
PLATELET # BLD AUTO: 384 K/UL — SIGNIFICANT CHANGE UP (ref 150–400)
POTASSIUM SERPL-MCNC: 4 MMOL/L — SIGNIFICANT CHANGE UP (ref 3.5–5.3)
POTASSIUM SERPL-MCNC: 4.6 MMOL/L — SIGNIFICANT CHANGE UP (ref 3.5–5.3)
POTASSIUM SERPL-SCNC: 4 MMOL/L — SIGNIFICANT CHANGE UP (ref 3.5–5.3)
POTASSIUM SERPL-SCNC: 4.6 MMOL/L — SIGNIFICANT CHANGE UP (ref 3.5–5.3)
PROT UR-MCNC: NEGATIVE — SIGNIFICANT CHANGE UP
PROTHROM AB SERPL-ACNC: 13.2 SEC — HIGH (ref 9.5–13)
RBC # BLD: 4.01 M/UL — SIGNIFICANT CHANGE UP (ref 3.8–5.2)
RBC # BLD: 4.19 M/UL — SIGNIFICANT CHANGE UP (ref 3.8–5.2)
RBC # FLD: 23.4 % — HIGH (ref 10.3–14.5)
RBC # FLD: 24 % — HIGH (ref 10.3–14.5)
SODIUM SERPL-SCNC: 138 MMOL/L — SIGNIFICANT CHANGE UP (ref 135–145)
SODIUM SERPL-SCNC: 139 MMOL/L — SIGNIFICANT CHANGE UP (ref 135–145)
SP GR SPEC: 1.02 — SIGNIFICANT CHANGE UP (ref 1.01–1.02)
UROBILINOGEN FLD QL: NEGATIVE — SIGNIFICANT CHANGE UP
WBC # BLD: 11.24 K/UL — HIGH (ref 3.8–10.5)
WBC # BLD: 23.12 K/UL — HIGH (ref 3.8–10.5)
WBC # FLD AUTO: 11.24 K/UL — HIGH (ref 3.8–10.5)
WBC # FLD AUTO: 23.12 K/UL — HIGH (ref 3.8–10.5)

## 2023-08-05 PROCEDURE — 81001 URINALYSIS AUTO W/SCOPE: CPT

## 2023-08-05 PROCEDURE — 99223 1ST HOSP IP/OBS HIGH 75: CPT | Mod: 57

## 2023-08-05 PROCEDURE — 27236 TREAT THIGH FRACTURE: CPT | Mod: LT

## 2023-08-05 PROCEDURE — 85018 HEMOGLOBIN: CPT

## 2023-08-05 PROCEDURE — 97110 THERAPEUTIC EXERCISES: CPT | Mod: GO

## 2023-08-05 PROCEDURE — 71045 X-RAY EXAM CHEST 1 VIEW: CPT

## 2023-08-05 PROCEDURE — 88311 DECALCIFY TISSUE: CPT | Mod: 26

## 2023-08-05 PROCEDURE — 94640 AIRWAY INHALATION TREATMENT: CPT

## 2023-08-05 PROCEDURE — C1713: CPT

## 2023-08-05 PROCEDURE — 82306 VITAMIN D 25 HYDROXY: CPT

## 2023-08-05 PROCEDURE — 87077 CULTURE AEROBIC IDENTIFY: CPT

## 2023-08-05 PROCEDURE — C1776: CPT

## 2023-08-05 PROCEDURE — 85014 HEMATOCRIT: CPT

## 2023-08-05 PROCEDURE — 85025 COMPLETE CBC W/AUTO DIFF WBC: CPT

## 2023-08-05 PROCEDURE — 99223 1ST HOSP IP/OBS HIGH 75: CPT

## 2023-08-05 PROCEDURE — 97530 THERAPEUTIC ACTIVITIES: CPT | Mod: GP

## 2023-08-05 PROCEDURE — 93306 TTE W/DOPPLER COMPLETE: CPT

## 2023-08-05 PROCEDURE — 36430 TRANSFUSION BLD/BLD COMPNT: CPT

## 2023-08-05 PROCEDURE — 97166 OT EVAL MOD COMPLEX 45 MIN: CPT | Mod: GO

## 2023-08-05 PROCEDURE — 93005 ELECTROCARDIOGRAM TRACING: CPT

## 2023-08-05 PROCEDURE — 71250 CT THORAX DX C-: CPT

## 2023-08-05 PROCEDURE — 85027 COMPLETE CBC AUTOMATED: CPT

## 2023-08-05 PROCEDURE — 82040 ASSAY OF SERUM ALBUMIN: CPT

## 2023-08-05 PROCEDURE — 88305 TISSUE EXAM BY PATHOLOGIST: CPT

## 2023-08-05 PROCEDURE — 86923 COMPATIBILITY TEST ELECTRIC: CPT

## 2023-08-05 PROCEDURE — 88311 DECALCIFY TISSUE: CPT

## 2023-08-05 PROCEDURE — 87040 BLOOD CULTURE FOR BACTERIA: CPT

## 2023-08-05 PROCEDURE — 80048 BASIC METABOLIC PNL TOTAL CA: CPT

## 2023-08-05 PROCEDURE — 97535 SELF CARE MNGMENT TRAINING: CPT | Mod: GO

## 2023-08-05 PROCEDURE — 87150 DNA/RNA AMPLIFIED PROBE: CPT

## 2023-08-05 PROCEDURE — P9016: CPT

## 2023-08-05 PROCEDURE — 88305 TISSUE EXAM BY PATHOLOGIST: CPT | Mod: 26

## 2023-08-05 PROCEDURE — 81003 URINALYSIS AUTO W/O SCOPE: CPT

## 2023-08-05 PROCEDURE — 36415 COLL VENOUS BLD VENIPUNCTURE: CPT

## 2023-08-05 PROCEDURE — 73501 X-RAY EXAM HIP UNI 1 VIEW: CPT | Mod: LT

## 2023-08-05 PROCEDURE — 97162 PT EVAL MOD COMPLEX 30 MIN: CPT | Mod: GP

## 2023-08-05 PROCEDURE — 97116 GAIT TRAINING THERAPY: CPT | Mod: GP

## 2023-08-05 PROCEDURE — 73501 X-RAY EXAM HIP UNI 1 VIEW: CPT | Mod: 26,LT

## 2023-08-05 PROCEDURE — 93306 TTE W/DOPPLER COMPLETE: CPT | Mod: 26

## 2023-08-05 PROCEDURE — 76000 FLUOROSCOPY <1 HR PHYS/QHP: CPT

## 2023-08-05 PROCEDURE — 93010 ELECTROCARDIOGRAM REPORT: CPT | Mod: 76

## 2023-08-05 PROCEDURE — 82962 GLUCOSE BLOOD TEST: CPT

## 2023-08-05 PROCEDURE — 83880 ASSAY OF NATRIURETIC PEPTIDE: CPT

## 2023-08-05 RX ORDER — DILTIAZEM HCL 120 MG
240 CAPSULE, EXT RELEASE 24 HR ORAL DAILY
Refills: 0 | Status: DISCONTINUED | OUTPATIENT
Start: 2023-08-05 | End: 2023-08-10

## 2023-08-05 RX ORDER — DEXTROSE 50 % IN WATER 50 %
25 SYRINGE (ML) INTRAVENOUS ONCE
Refills: 0 | Status: DISCONTINUED | OUTPATIENT
Start: 2023-08-05 | End: 2023-08-10

## 2023-08-05 RX ORDER — OXYCODONE HYDROCHLORIDE 5 MG/1
5 TABLET ORAL EVERY 4 HOURS
Refills: 0 | Status: DISCONTINUED | OUTPATIENT
Start: 2023-08-05 | End: 2023-08-06

## 2023-08-05 RX ORDER — SODIUM CHLORIDE 9 MG/ML
1000 INJECTION, SOLUTION INTRAVENOUS
Refills: 0 | Status: DISCONTINUED | OUTPATIENT
Start: 2023-08-05 | End: 2023-08-10

## 2023-08-05 RX ORDER — DEXTROSE 50 % IN WATER 50 %
15 SYRINGE (ML) INTRAVENOUS ONCE
Refills: 0 | Status: DISCONTINUED | OUTPATIENT
Start: 2023-08-05 | End: 2023-08-10

## 2023-08-05 RX ORDER — AZITHROMYCIN 500 MG/1
500 TABLET, FILM COATED ORAL ONCE
Refills: 0 | Status: COMPLETED | OUTPATIENT
Start: 2023-08-05 | End: 2023-08-05

## 2023-08-05 RX ORDER — SENNA PLUS 8.6 MG/1
2 TABLET ORAL AT BEDTIME
Refills: 0 | Status: DISCONTINUED | OUTPATIENT
Start: 2023-08-05 | End: 2023-08-10

## 2023-08-05 RX ORDER — ONDANSETRON 8 MG/1
4 TABLET, FILM COATED ORAL ONCE
Refills: 0 | Status: DISCONTINUED | OUTPATIENT
Start: 2023-08-05 | End: 2023-08-05

## 2023-08-05 RX ORDER — GLUCAGON INJECTION, SOLUTION 0.5 MG/.1ML
1 INJECTION, SOLUTION SUBCUTANEOUS ONCE
Refills: 0 | Status: DISCONTINUED | OUTPATIENT
Start: 2023-08-05 | End: 2023-08-10

## 2023-08-05 RX ORDER — DENOSUMAB 60 MG/ML
0 INJECTION SUBCUTANEOUS
Qty: 0 | Refills: 0 | DISCHARGE

## 2023-08-05 RX ORDER — ALLOPURINOL 300 MG
1 TABLET ORAL
Qty: 0 | Refills: 0 | DISCHARGE

## 2023-08-05 RX ORDER — FENTANYL CITRATE 50 UG/ML
50 INJECTION INTRAVENOUS
Refills: 0 | Status: DISCONTINUED | OUTPATIENT
Start: 2023-08-05 | End: 2023-08-05

## 2023-08-05 RX ORDER — FLUTICASONE FUROATE, UMECLIDINIUM BROMIDE AND VILANTEROL TRIFENATATE 200; 62.5; 25 UG/1; UG/1; UG/1
1 POWDER RESPIRATORY (INHALATION)
Qty: 0 | Refills: 0 | DISCHARGE

## 2023-08-05 RX ORDER — ZOLPIDEM TARTRATE 10 MG/1
5 TABLET ORAL AT BEDTIME
Refills: 0 | Status: DISCONTINUED | OUTPATIENT
Start: 2023-08-05 | End: 2023-08-10

## 2023-08-05 RX ORDER — MESALAMINE 400 MG
4 TABLET, DELAYED RELEASE (ENTERIC COATED) ORAL
Qty: 0 | Refills: 0 | DISCHARGE

## 2023-08-05 RX ORDER — ALLOPURINOL 300 MG
300 TABLET ORAL DAILY
Refills: 0 | Status: DISCONTINUED | OUTPATIENT
Start: 2023-08-05 | End: 2023-08-10

## 2023-08-05 RX ORDER — ENOXAPARIN SODIUM 100 MG/ML
40 INJECTION SUBCUTANEOUS EVERY 24 HOURS
Refills: 0 | Status: DISCONTINUED | OUTPATIENT
Start: 2023-08-06 | End: 2023-08-07

## 2023-08-05 RX ORDER — MORPHINE SULFATE 50 MG/1
4 CAPSULE, EXTENDED RELEASE ORAL
Refills: 0 | Status: DISCONTINUED | OUTPATIENT
Start: 2023-08-05 | End: 2023-08-06

## 2023-08-05 RX ORDER — CEFTRIAXONE 500 MG/1
1000 INJECTION, POWDER, FOR SOLUTION INTRAMUSCULAR; INTRAVENOUS ONCE
Refills: 0 | Status: DISCONTINUED | OUTPATIENT
Start: 2023-08-05 | End: 2023-08-05

## 2023-08-05 RX ORDER — LANOLIN ALCOHOL/MO/W.PET/CERES
3 CREAM (GRAM) TOPICAL AT BEDTIME
Refills: 0 | Status: DISCONTINUED | OUTPATIENT
Start: 2023-08-05 | End: 2023-08-10

## 2023-08-05 RX ORDER — CHOLECALCIFEROL (VITAMIN D3) 125 MCG
1 CAPSULE ORAL
Qty: 0 | Refills: 0 | DISCHARGE

## 2023-08-05 RX ORDER — CEFAZOLIN SODIUM 1 G
2000 VIAL (EA) INJECTION EVERY 8 HOURS
Refills: 0 | Status: COMPLETED | OUTPATIENT
Start: 2023-08-05 | End: 2023-08-06

## 2023-08-05 RX ORDER — DEXTROSE 50 % IN WATER 50 %
12.5 SYRINGE (ML) INTRAVENOUS ONCE
Refills: 0 | Status: DISCONTINUED | OUTPATIENT
Start: 2023-08-05 | End: 2023-08-10

## 2023-08-05 RX ORDER — ACETAMINOPHEN 500 MG
975 TABLET ORAL EVERY 8 HOURS
Refills: 0 | Status: DISCONTINUED | OUTPATIENT
Start: 2023-08-05 | End: 2023-08-10

## 2023-08-05 RX ORDER — ENOXAPARIN SODIUM 100 MG/ML
40 INJECTION SUBCUTANEOUS EVERY 24 HOURS
Refills: 0 | Status: DISCONTINUED | OUTPATIENT
Start: 2023-08-05 | End: 2023-08-05

## 2023-08-05 RX ORDER — TRANEXAMIC ACID 100 MG/ML
2000 INJECTION, SOLUTION INTRAVENOUS ONCE
Refills: 0 | Status: DISCONTINUED | OUTPATIENT
Start: 2023-08-05 | End: 2023-08-05

## 2023-08-05 RX ORDER — APIXABAN 2.5 MG/1
1 TABLET, FILM COATED ORAL
Qty: 0 | Refills: 0 | DISCHARGE

## 2023-08-05 RX ORDER — MAGNESIUM HYDROXIDE 400 MG/1
30 TABLET, CHEWABLE ORAL DAILY
Refills: 0 | Status: DISCONTINUED | OUTPATIENT
Start: 2023-08-05 | End: 2023-08-10

## 2023-08-05 RX ORDER — TRANEXAMIC ACID 100 MG/ML
1000 INJECTION, SOLUTION INTRAVENOUS ONCE
Refills: 0 | Status: DISCONTINUED | OUTPATIENT
Start: 2023-08-05 | End: 2023-08-10

## 2023-08-05 RX ORDER — METOPROLOL TARTRATE 50 MG
1 TABLET ORAL
Qty: 0 | Refills: 0 | DISCHARGE

## 2023-08-05 RX ORDER — INSULIN LISPRO 100/ML
VIAL (ML) SUBCUTANEOUS
Refills: 0 | Status: DISCONTINUED | OUTPATIENT
Start: 2023-08-05 | End: 2023-08-10

## 2023-08-05 RX ORDER — L.ACIDOPH/B.ANIMALIS/B.LONGUM 15B CELL
1 CAPSULE ORAL
Qty: 0 | Refills: 0 | DISCHARGE

## 2023-08-05 RX ORDER — SODIUM CHLORIDE 9 MG/ML
1000 INJECTION INTRAMUSCULAR; INTRAVENOUS; SUBCUTANEOUS
Refills: 0 | Status: DISCONTINUED | OUTPATIENT
Start: 2023-08-05 | End: 2023-08-05

## 2023-08-05 RX ORDER — POLYETHYLENE GLYCOL 3350 17 G/17G
17 POWDER, FOR SOLUTION ORAL DAILY
Refills: 0 | Status: DISCONTINUED | OUTPATIENT
Start: 2023-08-05 | End: 2023-08-10

## 2023-08-05 RX ORDER — ALBUTEROL 90 UG/1
1 AEROSOL, METERED ORAL EVERY 4 HOURS
Refills: 0 | Status: DISCONTINUED | OUTPATIENT
Start: 2023-08-05 | End: 2023-08-10

## 2023-08-05 RX ORDER — CEFTRIAXONE 500 MG/1
1000 INJECTION, POWDER, FOR SOLUTION INTRAMUSCULAR; INTRAVENOUS ONCE
Refills: 0 | Status: COMPLETED | OUTPATIENT
Start: 2023-08-05 | End: 2023-08-05

## 2023-08-05 RX ORDER — ALBUTEROL 90 UG/1
2.5 AEROSOL, METERED ORAL EVERY 6 HOURS
Refills: 0 | Status: DISCONTINUED | OUTPATIENT
Start: 2023-08-05 | End: 2023-08-10

## 2023-08-05 RX ORDER — ONDANSETRON 8 MG/1
4 TABLET, FILM COATED ORAL EVERY 6 HOURS
Refills: 0 | Status: DISCONTINUED | OUTPATIENT
Start: 2023-08-05 | End: 2023-08-10

## 2023-08-05 RX ORDER — SIMVASTATIN 20 MG/1
20 TABLET, FILM COATED ORAL AT BEDTIME
Refills: 0 | Status: DISCONTINUED | OUTPATIENT
Start: 2023-08-05 | End: 2023-08-10

## 2023-08-05 RX ORDER — METFORMIN HYDROCHLORIDE 850 MG/1
1 TABLET ORAL
Qty: 0 | Refills: 0 | DISCHARGE

## 2023-08-05 RX ORDER — MORPHINE SULFATE 50 MG/1
2 CAPSULE, EXTENDED RELEASE ORAL EVERY 4 HOURS
Refills: 0 | Status: DISCONTINUED | OUTPATIENT
Start: 2023-08-05 | End: 2023-08-05

## 2023-08-05 RX ORDER — OXYCODONE HYDROCHLORIDE 5 MG/1
5 TABLET ORAL ONCE
Refills: 0 | Status: DISCONTINUED | OUTPATIENT
Start: 2023-08-05 | End: 2023-08-05

## 2023-08-05 RX ORDER — DILTIAZEM HCL 120 MG
1 CAPSULE, EXT RELEASE 24 HR ORAL
Qty: 0 | Refills: 0 | DISCHARGE

## 2023-08-05 RX ORDER — MORPHINE SULFATE 50 MG/1
4 CAPSULE, EXTENDED RELEASE ORAL ONCE
Refills: 0 | Status: DISCONTINUED | OUTPATIENT
Start: 2023-08-05 | End: 2023-08-05

## 2023-08-05 RX ORDER — CEFAZOLIN SODIUM 1 G
3000 VIAL (EA) INJECTION EVERY 8 HOURS
Refills: 0 | Status: DISCONTINUED | OUTPATIENT
Start: 2023-08-05 | End: 2023-08-05

## 2023-08-05 RX ORDER — IPRATROPIUM/ALBUTEROL SULFATE 18-103MCG
3 AEROSOL WITH ADAPTER (GRAM) INHALATION ONCE
Refills: 0 | Status: COMPLETED | OUTPATIENT
Start: 2023-08-05 | End: 2023-08-05

## 2023-08-05 RX ORDER — ZOLPIDEM TARTRATE 10 MG/1
1 TABLET ORAL
Qty: 0 | Refills: 0 | DISCHARGE

## 2023-08-05 RX ORDER — METOPROLOL TARTRATE 50 MG
25 TABLET ORAL DAILY
Refills: 0 | Status: DISCONTINUED | OUTPATIENT
Start: 2023-08-05 | End: 2023-08-10

## 2023-08-05 RX ORDER — OXYCODONE HYDROCHLORIDE 5 MG/1
2.5 TABLET ORAL EVERY 4 HOURS
Refills: 0 | Status: DISCONTINUED | OUTPATIENT
Start: 2023-08-05 | End: 2023-08-06

## 2023-08-05 RX ORDER — UBIDECARENONE 100 MG
0 CAPSULE ORAL
Qty: 0 | Refills: 0 | DISCHARGE

## 2023-08-05 RX ORDER — ACETAMINOPHEN 500 MG
650 TABLET ORAL EVERY 6 HOURS
Refills: 0 | Status: DISCONTINUED | OUTPATIENT
Start: 2023-08-05 | End: 2023-08-05

## 2023-08-05 RX ORDER — SIMVASTATIN 20 MG/1
1 TABLET, FILM COATED ORAL
Qty: 0 | Refills: 0 | DISCHARGE

## 2023-08-05 RX ORDER — OXYCODONE AND ACETAMINOPHEN 5; 325 MG/1; MG/1
1 TABLET ORAL EVERY 4 HOURS
Refills: 0 | Status: DISCONTINUED | OUTPATIENT
Start: 2023-08-05 | End: 2023-08-05

## 2023-08-05 RX ORDER — SODIUM CHLORIDE 9 MG/ML
1000 INJECTION, SOLUTION INTRAVENOUS
Refills: 0 | Status: DISCONTINUED | OUTPATIENT
Start: 2023-08-05 | End: 2023-08-05

## 2023-08-05 RX ORDER — ONDANSETRON 8 MG/1
4 TABLET, FILM COATED ORAL EVERY 6 HOURS
Refills: 0 | Status: DISCONTINUED | OUTPATIENT
Start: 2023-08-05 | End: 2023-08-05

## 2023-08-05 RX ORDER — LANOLIN ALCOHOL/MO/W.PET/CERES
3 CREAM (GRAM) TOPICAL AT BEDTIME
Refills: 0 | Status: DISCONTINUED | OUTPATIENT
Start: 2023-08-05 | End: 2023-08-05

## 2023-08-05 RX ORDER — TIOTROPIUM BROMIDE 18 UG/1
2 CAPSULE ORAL; RESPIRATORY (INHALATION) DAILY
Refills: 0 | Status: DISCONTINUED | OUTPATIENT
Start: 2023-08-05 | End: 2023-08-10

## 2023-08-05 RX ADMIN — OXYCODONE HYDROCHLORIDE 5 MILLIGRAM(S): 5 TABLET ORAL at 17:35

## 2023-08-05 RX ADMIN — MORPHINE SULFATE 4 MILLIGRAM(S): 50 CAPSULE, EXTENDED RELEASE ORAL at 11:07

## 2023-08-05 RX ADMIN — FENTANYL CITRATE 50 MICROGRAM(S): 50 INJECTION INTRAVENOUS at 17:45

## 2023-08-05 RX ADMIN — AZITHROMYCIN 255 MILLIGRAM(S): 500 TABLET, FILM COATED ORAL at 02:50

## 2023-08-05 RX ADMIN — FENTANYL CITRATE 50 MICROGRAM(S): 50 INJECTION INTRAVENOUS at 17:35

## 2023-08-05 RX ADMIN — Medication 3: at 17:09

## 2023-08-05 RX ADMIN — Medication 3 MILLILITER(S): at 16:58

## 2023-08-05 RX ADMIN — ALBUTEROL 2.5 MILLIGRAM(S): 90 AEROSOL, METERED ORAL at 12:04

## 2023-08-05 RX ADMIN — Medication 40 MILLIGRAM(S): at 12:48

## 2023-08-05 RX ADMIN — Medication 975 MILLIGRAM(S): at 22:47

## 2023-08-05 RX ADMIN — Medication 100 MILLIGRAM(S): at 21:26

## 2023-08-05 RX ADMIN — SIMVASTATIN 20 MILLIGRAM(S): 20 TABLET, FILM COATED ORAL at 21:25

## 2023-08-05 RX ADMIN — CEFTRIAXONE 1000 MILLIGRAM(S): 500 INJECTION, POWDER, FOR SOLUTION INTRAMUSCULAR; INTRAVENOUS at 02:49

## 2023-08-05 RX ADMIN — SENNA PLUS 2 TABLET(S): 8.6 TABLET ORAL at 21:26

## 2023-08-05 RX ADMIN — ALBUTEROL 2.5 MILLIGRAM(S): 90 AEROSOL, METERED ORAL at 20:14

## 2023-08-05 RX ADMIN — MORPHINE SULFATE 4 MILLIGRAM(S): 50 CAPSULE, EXTENDED RELEASE ORAL at 02:49

## 2023-08-05 RX ADMIN — Medication 975 MILLIGRAM(S): at 21:26

## 2023-08-05 RX ADMIN — ONDANSETRON 4 MILLIGRAM(S): 8 TABLET, FILM COATED ORAL at 04:33

## 2023-08-05 RX ADMIN — OXYCODONE HYDROCHLORIDE 5 MILLIGRAM(S): 5 TABLET ORAL at 17:19

## 2023-08-05 RX ADMIN — MORPHINE SULFATE 2 MILLIGRAM(S): 50 CAPSULE, EXTENDED RELEASE ORAL at 07:22

## 2023-08-05 RX ADMIN — Medication 3 MILLIGRAM(S): at 21:25

## 2023-08-05 NOTE — CONSULT NOTE ADULT - ATTENDING COMMENTS
agree w above. pt p/w L hip pain s/p GLF. denies other new injuries. does report a recent L prox humerus fx sustained in june. denies n/t. ambulates w/o asst at baseline. PMH DM, COPD on 6LNC at Home and AFib on Eliquis (Last Dose 6pm 8/4/23).    LLE  ttp at hip only  pain w/ log roll  +DF/PF/EHL/FHL  SILT throughout foot    nonttp elsewhere except L shoulder from prior injury    Imaging shows a valgus impacted L FN fx    R/B/A d/w pt and family. Considering the pts PMH and minimal fx displacement we elected for a CRPP. Did discuss the bloody supply may have been disrupted and the pt may potentially need a HHA in the future if the fx does not heal. Plan for CRPP L Hip. Pt has been optimized per med/cards/pulm, appreciate their input. All questions answered.

## 2023-08-05 NOTE — ED ADULT NURSE REASSESSMENT NOTE - NS ED NURSE REASSESS COMMENT FT1
Assumed care from Natali MASTERS. Patient resting comfortably in bed. No pain at this time. Vital signs stable. Patient is aware of plan. Awaiting confirmation if patient will be going to the OR today or not.

## 2023-08-05 NOTE — CONSULT NOTE ADULT - SUBJECTIVE AND OBJECTIVE BOX
82y Female presents to the ED w/ L Hip Pain s/p MF. Community ambulator w/o assistance at baseline. Pt states she was walking in her house, tripped and fell onto L Hip. Pt noticed immediate pain and inability to ambulate s/p MF. No other orthopedic injuries or complaints. No HS/LOC. No hx of orthopedic surgeries in the past. Recently seen in ED on 6/22/23 for L Proximal humerus fx s/p MF, treated non-operatively. Hx of COPD on 6LNC at Home and AFib on Eliquis (Last Dose 6pm 8/4/23). Denies CP, SOB, numbness/tingling, weakness, fever, chills or any other complaints.     PAST MEDICAL & SURGICAL HISTORY:  HTN (hypertension)      HLD (hyperlipidemia)      DM (diabetes mellitus)      Paroxysmal A-fib  on Eliquis      ANU (obstructive sleep apnea)  on CPAP      H/O gastroesophageal reflux (GERD)      History of ulcerative colitis      S/P appendectomy        MEDICATIONS  (STANDING):  azithromycin  IVPB 500 milliGRAM(s) IV Intermittent once  cefTRIAXone Injectable. 1000 milliGRAM(s) IV Push Once  morphine  - Injectable 4 milliGRAM(s) IV Push Once  sodium chloride 0.9%. 1000 milliLiter(s) (125 mL/Hr) IV Continuous <Continuous>    MEDICATIONS  (PRN):  morphine  - Injectable 2 milliGRAM(s) IV Push every 4 hours PRN Severe Pain (7 - 10)  ondansetron Injectable 4 milliGRAM(s) IV Push every 6 hours PRN Nausea and/or Vomiting    Allergies    aspirin (Stomach Upset)    Intolerances        T(C): 36.6 (08-04-23 @ 20:12), Max: 36.6 (08-04-23 @ 20:12)  HR: 85 (08-04-23 @ 20:12) (85 - 85)  BP: 141/51 (08-04-23 @ 20:12) (141/51 - 141/51)  RR: 18 (08-04-23 @ 20:12) (18 - 18)  SpO2: 91% (08-04-23 @ 20:12) (91% - 91%)  Wt(kg): --    PE:   LLE:   Skin intact  Mild TTP at hip  Unable to SLR secondary to pain  + EHL/FHL/GS/TA  SILT Tib/SPN/DPN/Sural/Saph  DP/PT Palpable  Compartments soft and compressible  No Calf TTP    SECONDARY EXAM: Benign, Skin intact, SILT throughout, motor grossly intact throughout, no other orthopedic injuries at this time, compartments soft and compressible    SPINE: Skin intact, no bony tenderness or step-offs appreciated throughout cervical/thoracic/lumbar/sacral spine    BL UE: Skin intact, no erythema, ecchymosis, edema, gross deformity, NTTP over the bony prominences of the shoulder/elbow/wrist/hand, painless passive/active ROM of the shoulder/elbow/wrist/hand, C5-T1 SILT, motor grossly intact throughout axillary/musculocutaneous/radial/median/ulnar nerves, + radial pulse    RLE: Skin intact, no erythema, ecchymosis, edema, gross deformity, NTTP over the bony prominences of the hip/knee/ankle/foot, painless passive/active ROM of the hip/knee/ankle/foot, L2-S1 SILT, motor grossly intact throughout Hip Flexors/Quadriceps/Hamstrings/TA/EHL/FHL/GSC, + DP/PT pulses, no pain with log roll, no pain on axial loading, compartments soft and compressible, calf nontender       Imaging:  XR: L Hip Femoral Neck Fx    82y Female with L Femoral Neck Fx s/p MF.     - Plan for OR Today 8/5/23 for CRPP vs Hemiarthroplasty w/ Dr. Cárdenas  - Pain control prn  - NPO, IVF while NPO  - DVT PPx: SCDs. Hold Chemical DVT PPx until POD1.   - LLE NWB, Bedrest  - FU Preop labs and imaging  - Admit to Medicine. Please Document Medical Clearance For OR.   - Will discuss plan w/ Dr. Cárdenas and change accordingly.

## 2023-08-05 NOTE — H&P ADULT - HISTORY OF PRESENT ILLNESS
Pt is a 83 y/o female w/PMHx of HTN, HLD, DM, Paroxysmal Afib, ANU, COPD on 6L O2 at home, presents after fall c/o hip pain. Pt states she was walking in her house, tripped and fell onto L Hip. Pt noticed immediate pain and inability to ambulate s/p MF. No other orthopedic injuries or complaints. No HS/LOC. No hx of orthopedic surgeries in the past. Recently seen in ED on 6/22/23 for L Proximal humerus fx s/p MF, treated non-operatively. Hx of severe COPD on 6LNC at Home and AFib on Eliquis (Last Dose 6pm 8/4/23).

## 2023-08-05 NOTE — DISCHARGE NOTE PROVIDER - NSDCCPCAREPLAN_GEN_ALL_CORE_FT
PRINCIPAL DISCHARGE DIAGNOSIS  Diagnosis: Fracture of femoral neck, left  Assessment and Plan of Treatment:

## 2023-08-05 NOTE — BRIEF OPERATIVE NOTE - NSICDXBRIEFPROCEDURE_GEN_ALL_CORE_FT
PROCEDURES:  ORIF, fracture, femur, neck, left 05-Aug-2023 16:38:28 melisa hip arthroplasty Keri Hernandez

## 2023-08-05 NOTE — H&P ADULT - NSHPPHYSICALEXAM_GEN_ALL_CORE
Vital Signs Last 24 Hrs  T(C): 37.2 (05 Aug 2023 07:28), Max: 37.2 (05 Aug 2023 07:28)  T(F): 99 (05 Aug 2023 07:28), Max: 99 (05 Aug 2023 07:28)  HR: 89 (05 Aug 2023 07:28) (85 - 92)  BP: 139/60 (05 Aug 2023 07:28) (135/51 - 141/51)  BP(mean): 78 (05 Aug 2023 07:28) (78 - 78)  RR: 18 (05 Aug 2023 07:28) (18 - 19)  SpO2: 92% (05 Aug 2023 07:28) (91% - 98%)    Parameters below as of 05 Aug 2023 07:28  Patient On (Oxygen Delivery Method): nasal cannula  O2 Flow (L/min): 6      PHYSICAL EXAM:  GENERAL: NAD, lying in bed comfortably  HEAD:  Atraumatic, Normocephalic  EYES: EOMI, PERRLA, conjunctiva and sclera clear  ENT: Moist mucous membranes  NECK: Supple, No JVD  CHEST/LUNG: Clear to auscultation bilaterally; slightly decreased breath sounds, No rales, rhonchi, wheezing, or rubs. Unlabored respirations  HEART: Regular rate and rhythm; III/VI JOSE ANGEL  ABDOMEN: Bowel sounds present; Soft, Nontender, Nondistended.   EXTREMITIES:  lle shorter  NERVOUS SYSTEM:  Alert & Oriented X3, speech clear. No new deficits

## 2023-08-05 NOTE — DISCHARGE NOTE PROVIDER - NSDCCAREPROVSEEN_GEN_ALL_CORE_FT
Jose Luis Cárdenas Avi, Jose Luis Taylor, Annette Gutierrez, Brooks Bull, Mel Barkley, Romeo Booker, Karsten Porras, Connor Brown, Nitza

## 2023-08-05 NOTE — BRIEF OPERATIVE NOTE - PRIMARY SURGEON
Avi Complex Repair And Skin Substitute Graft Text: The defect edges were debeveled with a #15 scalpel blade.  The primary defect was closed partially with a complex linear closure.  Given the location of the remaining defect, shape of the defect and the proximity to free margins a skin substitute graft was deemed most appropriate to repair the remaining defect.  The graft was trimmed to fit the size of the remaining defect.  The graft was then placed in the primary defect, oriented appropriately, and sutured into place.

## 2023-08-05 NOTE — DISCHARGE NOTE PROVIDER - CARE PROVIDER_API CALL
Jose Luis Cárdenas  Orthopaedic Surgery  [address] [place] [id number]  Phone: [phone]  Fax: [phone]  Follow Up Time:

## 2023-08-05 NOTE — CONSULT NOTE ADULT - SUBJECTIVE AND OBJECTIVE BOX
Patient is a 82y old  Female who presents with a chief complaint of left hip pain.    8/5/2023-Cardiology Consultation: Seen for Dr Way. Seen for preop cardiac evaluation. She tripped and fell on 8/4/23 and has a left hip fracture.    She has severe oxygen dependant COPD, ANU, HBP, HLD, DM, RBBB, PAF with one episode many years ago. Last dose of Eliquis on 8/4 at 4 PM.  Currently denies chest pain, syncope  or palpitations. Chronic dyspnea on exertion is stable per the patient.       HPI:  Pt is a 83 y/o female w/PMHx of HTN, HLD, DM, Paroxysmal Afib, ANU, COPD on 6L O2 at home, presents after fall c/o hip pain. Pt states she was walking in her house, tripped and fell onto L Hip. Pt noticed immediate pain and inability to ambulate s/p MF. No other orthopedic injuries or complaints. No HS/LOC. No hx of orthopedic surgeries in the past. Recently seen in ED on 6/22/23 for L Proximal humerus fx s/p MF, treated non-operatively. Hx of severe COPD on 6LNC at Home and AFib on Eliquis (Last Dose 6pm 8/4/23). (05 Aug 2023 07:44)      PAST MEDICAL & SURGICAL HISTORY:  HTN (hypertension)      HLD (hyperlipidemia)      DM (diabetes mellitus)      Paroxysmal A-fib  on Eliquis      ANU (obstructive sleep apnea)  on CPAP      H/O gastroesophageal reflux (GERD)      History of ulcerative colitis      S/P appendectomy        Allergies and Intolerances:        Allergies:  	aspirin: Drug, Stomach Upset, cholitis    Home Medications:   * Patient Currently Takes Medications as of 05-Aug-2023 01:19 documented in Structured Notes  · 	metFORMIN 500 mg oral tablet: Last Dose Taken:  , 2 tab(s) orally once a day (in the morning)  · 	metFORMIN 500 mg oral tablet: Last Dose Taken:  , 1 tab(s) orally once (at bedtime)  · 	allopurinol 300 mg oral tablet: Last Dose Taken:  , 1 tab(s) orally once a day  · 	Cartia  mg/24 hours oral capsule, extended release: Last Dose Taken:  , 1 cap(s) orally once a day  · 	Metoprolol Succinate ER 25 mg oral tablet, extended release: Last Dose Taken:  , 1 tab(s) orally once a day  · 	simvastatin 20 mg oral tablet: Last Dose Taken:  , 1 tab(s) orally once a day (at bedtime)  · 	zolpidem 5 mg oral tablet: Last Dose Taken:  , 1 tab(s) orally once a day (at bedtime), As Needed  · 	dicyclomine 10 mg oral capsule: Last Dose Taken:  , 1 cap(s) orally 3 times a day, As Needed  · 	Apriso 0.375 g oral capsule, extended release: Last Dose Taken:  , 4 cap(s) orally once a day  · 	Vitamin D3 25 mcg (1000 intl units) oral tablet: Last Dose Taken:  , 1 tab(s) orally once a day  · 	CoQ10: Last Dose Taken:  , orally once a day  · 	Probiotic Formula oral capsule: Last Dose Taken:  , 1 cap(s) orally 2 times a day  · 	Prolia 60 mg/mL subcutaneous solution: Last Dose Taken:  , subcutaneous every 6 months  · 	Eliquis 5 mg oral tablet: Last Dose Taken:  , 1 tab(s) orally 2 times a day  · 	Trelegy Ellipta 100 mcg-62.5 mcg-25 mcg/inh inhalation powder: Last Dose Taken:  , 1 puff(s) inhaled once a day  · 	Pepcid Complete: Last Dose Taken:  , *** as needed***    MEDICATIONS  (STANDING):  albuterol    0.083% 2.5 milliGRAM(s) Nebulizer every 6 hours  albuterol    90 MICROgram(s) HFA Inhaler 1 Puff(s) Inhalation every 4 hours  allopurinol 300 milliGRAM(s) Oral daily  dextrose 5%. 1000 milliLiter(s) (100 mL/Hr) IV Continuous <Continuous>  dextrose 5%. 1000 milliLiter(s) (50 mL/Hr) IV Continuous <Continuous>  dextrose 50% Injectable 25 Gram(s) IV Push once  dextrose 50% Injectable 12.5 Gram(s) IV Push once  dextrose 50% Injectable 25 Gram(s) IV Push once  diltiazem    milliGRAM(s) Oral daily  glucagon  Injectable 1 milliGRAM(s) IntraMuscular once  insulin lispro (ADMELOG) corrective regimen sliding scale   SubCutaneous three times a day before meals  metoprolol succinate ER 25 milliGRAM(s) Oral daily  simvastatin 20 milliGRAM(s) Oral at bedtime  tiotropium 2.5 MICROgram(s) Inhaler 2 Puff(s) Inhalation daily    MEDICATIONS  (PRN):  acetaminophen     Tablet .. 650 milliGRAM(s) Oral every 6 hours PRN Temp greater or equal to 38C (100.4F), Mild Pain (1 - 3)  aluminum hydroxide/magnesium hydroxide/simethicone Suspension 30 milliLiter(s) Oral every 4 hours PRN Dyspepsia  dextrose Oral Gel 15 Gram(s) Oral once PRN Blood Glucose LESS THAN 70 milliGRAM(s)/deciliter  dicyclomine 10 milliGRAM(s) Oral three times a day before meals PRN spasms  morphine  - Injectable 4 milliGRAM(s) IV Push every 3 hours PRN Severe Pain (7 - 10)  ondansetron Injectable 4 milliGRAM(s) IV Push every 6 hours PRN Nausea and/or Vomiting  oxycodone    5 mG/acetaminophen 325 mG 1 Tablet(s) Oral every 4 hours PRN Moderate Pain (4 - 6)  zolpidem 5 milliGRAM(s) Oral at bedtime PRN Insomnia      FAMILY HISTORY:  FH: HTN (hypertension)  mother, father    FH: stroke  mother    FH: CHF (congestive heart failure)  father    FH: cancer  brother    Social History:  · Substance use	No  · Social History (marital status, living situation, occupation, and sexual history)	No smoking, no illicit drugs    Tobacco Screening:  · Core Measure Site	Yes  · Has the patient used tobacco in the past 30 days?	No      SOCIAL HISTORY:  Tobacco-           Alcohol-              Illicit drugs-              Occupation-              Marital  status-  REVIEW OF SYSTEM:  Pertinent items are noted in HPI.    Vital Signs Last 24 Hrs  T(C): 36.8 (05 Aug 2023 10:40), Max: 37.2 (05 Aug 2023 07:28)  T(F): 98.2 (05 Aug 2023 10:40), Max: 99 (05 Aug 2023 07:28)  HR: 89 (05 Aug 2023 10:40) (85 - 92)  BP: 149/57 (05 Aug 2023 10:40) (135/51 - 149/57)  BP(mean): 78 (05 Aug 2023 07:28) (78 - 78)  RR: 20 (05 Aug 2023 10:40) (18 - 20)  SpO2: 92% (05 Aug 2023 10:40) (91% - 98%)    Parameters below as of 05 Aug 2023 10:40  Patient On (Oxygen Delivery Method): nasal cannula  O2 Flow (L/min): 6      I&O's Summary    PHYSICAL EXAM  General Appearance:  no distress   HEENT: PERRL, conjunctiva clear, EOM's intact, non injected pharynx, no exudate, TM   normal  Neck: Supple, , no adenopathy, thyroid: not enlarged, no carotid bruit or JVD  Back: Symmetric, no  tenderness,no soft tissue tenderness  Lungs: Clear to auscultation bilaterally,no adventitious breath sounds, normal   expiratory phase  Heart: Regular rate and rhythm, S1 normal S2 reduced, 2/6 JOSE ANGEL base. No gallop.  Abdomen: Obese  Soft, non-tender, bowel sounds active , no hepatosplenomegaly  Extremities: no cyanosis or edema, no joint swelling. left leg externally rotated.  Skin: Skin color, texture normal, no rashes   Neurologic: Alert and oriented X3 , cranial nerves intact, sensory and motor normal,    TTE reviewed: Moderate aortic stenosis, mild concentric LVH with normal systolic function, EF 60-65%, moderate LAE, mild RVE and ELIDA, mild-moderate TR, mild pulmonary artery hypertension.    INTERPRETATION OF TELEMETRY:    ECG: sinus rhythm 91 BPM, RBBB.         LABS:                          10.1   11.24 )-----------( 273      ( 05 Aug 2023 02:30 )             31.0     08-05    139  |  107  |  13  ----------------------------<  148<H>  4.0   |  27  |  1.11    Ca    9.1      05 Aug 2023 02:30  Mg     1.7     08-04    TPro  6.8  /  Alb  3.2<L>  /  TBili  0.3  /  DBili  x   /  AST  18  /  ALT  17  /  AlkPhos  78  08-04            PT/INR - ( 05 Aug 2023 02:30 )   PT: 13.2 sec;   INR: 1.17 ratio         PTT - ( 05 Aug 2023 02:30 )  PTT:35.7 sec  Urinalysis Basic - ( 05 Aug 2023 02:30 )    Color: x / Appearance: x / SG: x / pH: x  Gluc: 148 mg/dL / Ketone: x  / Bili: x / Urobili: x   Blood: x / Protein: x / Nitrite: x   Leuk Esterase: x / RBC: x / WBC x   Sq Epi: x / Non Sq Epi: x / Bacteria: x            RADIOLOGY & ADDITIONAL STUDIES:      ACC: [id number] EXAM:  CT ABDOMEN AND PELVIS   ORDERED BY: JAIRON HORAN     ACC: [id number] EXAM:  CT CHEST   ORDERED BY: JAIRON HORAN     PROCEDURE DATE:  08/04/2023        IMPRESSION:  Acute left femoral neck fracture.    Mild interlobular septal thickening may reflect mild pulmonary edema.   Scattered small foci of tree-in-bud nodularity in the rightupper and   middle lobes likely reflecting infectious/inflammatory small airways   disease or impacted distal airways.        --- End of Report ---        JOSH WILLIAMSON MD; Attending Radiologist  This document has been electronically signed. Aug 5 2023 12:53AM                IMPRESSION:  1. Left femoral neck fracture.  2. Preoperative cardiac risk assessment: the patient is at moderate to high risk due to her COPD and age but she does appear optimal from both a pulmonary and cardiac perspective. The moderate aortic stenosis is not a contraindication to surgery.  Delaying the surgery will not improve the patient's risk. The last dose of Eliquis at 4 PM on 8/4 may increase the post operative surgical bleeding risk but this depends on the surgical approach and the attending surgeon is aware.  3.Moderate aortic stenosis. Requires no treatment.  4.PAF. Continue metoprolol and resume oral anticoagulation post operatively.  5. HBP. Continue metoprolol, diltiazem.  6. HLD. Continue statin.  7.COPD. Continue oxygen, inhalers.  8. DM. Insulin coverage.    PLAN:  As above. Will follow.

## 2023-08-05 NOTE — PATIENT PROFILE ADULT - NSPROPASSIVESMOKEEXPOSURE_GEN_A_NUR
My Asthma Action Plan    Name: Lizzie Becerra   YOB: 1950  Date: 10/19/2022   My doctor: Maria Luz Good MD   My clinic: [facility]        My Control Medicine: Fluticasone propionate + salmeterol (Advair HFA) -  115/21 mcg .  My Rescue Medicine: Albuterol (Proair/Ventolin/Proventil HFA) 2-4 puffs EVERY 4 HOURS as needed. Use a spacer if recommended by your provider.  My Oral Steroid Medicine: advair My Asthma Severity:   Mild Persistent  Know your asthma triggers: smoke               GREEN ZONE   Good Control    I feel good    No cough or wheeze    Can work, sleep and play without asthma symptoms       Take your asthma control medicine every day.     1. If exercise triggers your asthma, take your rescue medication    15 minutes before exercise or sports, and    During exercise if you have asthma symptoms  2. Spacer to use with inhaler: If you have a spacer, make sure to use it with your inhaler             YELLOW ZONE Getting Worse  I have ANY of these:    I do not feel good    Cough or wheeze    Chest feels tight    Wake up at night   1. Keep taking your Green Zone medications  2. Start taking your rescue medicine:    every 20 minutes for up to 1 hour. Then every 4 hours for 24-48 hours.  3. If you stay in the Yellow Zone for more than 12-24 hours, contact your doctor.  4. If you do not return to the Green Zone in 12-24 hours or you get worse, start taking your oral steroid medicine if prescribed by your provider.           RED ZONE Medical Alert - Get Help  I have ANY of these:    I feel awful    Medicine is not helping    Breathing getting harder    Trouble walking or talking    Nose opens wide to breathe       1. Take your rescue medicine NOW  2. If your provider has prescribed an oral steroid medicine, start taking it NOW  3. Call your doctor NOW  4. If you are still in the Red Zone after 20 minutes and you have not reached your doctor:    Take your rescue medicine again  No and    Call 911 or go to the emergency room right away    See your regular doctor within 2 weeks of an Emergency Room or Urgent Care visit for follow-up treatment.          Annual Reminders:  Meet with Asthma Educator,  Flu Shot in the Fall, consider Pneumonia Vaccination for patients with asthma (aged 19 and older).    Pharmacy: [id number] - [facility] [address] [place] [address] AVE AT [address]    Electronically signed by Maria Luz Good MD   Date: 10/19/22                      Asthma Triggers  How To Control Things That Make Your Asthma Worse    Triggers are things that make your asthma worse.  Look at the list below to help you find your triggers and what you can do about them.  You can help prevent asthma flare-ups by staying away from your triggers.      Trigger                                                          What you can do   Cigarette Smoke  Tobacco smoke can make asthma worse. Do not allow smoking in your home, car or around you.  Be sure no one smokes at a child s day care or school.  If you smoke, ask your health care provider for ways to help you quit.  Ask family members to quit too.  Ask your health care provider for a referral to Quit Plan to help you quit smoking, or call 9-696-829-PLAN.     Colds, Flu, Bronchitis  These are common triggers of asthma. Wash your hands often.  Don t touch your eyes, nose or mouth.  Get a flu shot every year.     Dust Mites  These are tiny bugs that live in cloth or carpet. They are too small to see. Wash sheets and blankets in hot water every week.   Encase pillows and mattress in dust mite proof covers.  Avoid having carpet if you can. If you have carpet, vacuum weekly.   Use a dust mask and HEPA vacuum.   Pollen and Outdoor Mold  Some people are allergic to trees, grass, or weed pollen, or molds. Try to keep your windows closed.  Limit time out doors when pollen count is high.   Ask you health care provider about taking medicine  during allergy season.     Animal Dander  Some people are allergic to skin flakes, urine or saliva from pets with fur or feathers. Keep pets with fur or feathers out of your home.    If you can t keep the pet outdoors, then keep the pet out of your bedroom.  Keep the bedroom door closed.  Keep pets off cloth furniture and away from stuffed toys.     Mice, Rats, and Cockroaches   Some people are allergic to the waste from these pests.   Cover food and garbage.  Clean up spills and food crumbs.  Store grease in the refrigerator.   Keep food out of the bedroom.   Indoor Mold  This can be a trigger if your home has high moisture. Fix leaking faucets, pipes, or other sources of water.   Clean moldy surfaces.  Dehumidify basement if it is damp and smelly.   Smoke, Strong Odors, and Sprays  These can reduce air quality. Stay away from strong odors and sprays, such as perfume, powder, hair spray, paints, smoke incense, paint, cleaning products, candles and new carpet.   Exercise or Sports  Some people with asthma have this trigger. Be active!  Ask your doctor about taking medicine before sports or exercise to prevent symptoms.    Warm up for 5-10 minutes before and after sports or exercise.     Other Triggers of Asthma  Cold air:  Cover your nose and mouth with a scarf.  Sometimes laughing or crying can be a trigger.  Some medicines and food can trigger asthma.

## 2023-08-05 NOTE — PATIENT PROFILE ADULT - FALL HARM RISK - FACTORS
ON-Call note from last night:    Mom contacted me after hours through the answering service (8:30 pm) because the Amox Rx that was sent by the [facility]  for strep throat concern was denied.  Mom was told that the prescribing doctor was not part of the insuranc Weakness

## 2023-08-05 NOTE — CONSULT NOTE ADULT - SUBJECTIVE AND OBJECTIVE BOX
HPI:    81 y/o female w/PMHx of HTN, HLD, DM, Paroxysmal Afib, ANU, COPD on 6L O2 at home, admitted after fall c/o hip pain. Pt states she was walking in her house, tripped and fell onto L Hip. Pt noticed immediate pain and inability to ambulate s/p MF. No other orthopedic injuries or complaints. No HS/LOC. No hx of orthopedic surgeries in the past. Recently seen in ED on 6/22/23 for L Proximal humerus fx s/p MF, treated non-operatively. Hx of severe COPD on 6LNC at Home and AFib on Eliquis, pat seen for pulmonary to optimized her condition for ortho surgical intervention., pat lying in bed, no respiratory distress.    PAST MEDICAL & SURGICAL HISTORY:  HTN (hypertension)      HLD (hyperlipidemia)      DM (diabetes mellitus)      Paroxysmal A-fib  on Eliquis      ANU (obstructive sleep apnea)  on CPAP      H/O gastroesophageal reflux (GERD)      History of ulcerative colitis      S/P appendectomy          Home Medications:  allopurinol 300 mg oral tablet: 1 tab(s) orally once a day (05 Aug 2023 01:19)  Apriso 0.375 g oral capsule, extended release: 4 cap(s) orally once a day (05 Aug 2023 01:19)  Cartia  mg/24 hours oral capsule, extended release: 1 cap(s) orally once a day (05 Aug 2023 01:19)  CoQ10: orally once a day (05 Aug 2023 01:19)  dicyclomine 10 mg oral capsule: 1 cap(s) orally 3 times a day, As Needed (05 Aug 2023 01:19)  Eliquis 5 mg oral tablet: 1 tab(s) orally 2 times a day (05 Aug 2023 01:19)  metFORMIN 500 mg oral tablet: 2 tab(s) orally once a day (in the morning) (05 Aug 2023 01:19)  metFORMIN 500 mg oral tablet: 1 tab(s) orally once (at bedtime) (05 Aug 2023 01:19)  Metoprolol Succinate ER 25 mg oral tablet, extended release: 1 tab(s) orally once a day (05 Aug 2023 01:19)  Pepcid Complete: *** as needed*** (05 Aug 2023 04:34)  Probiotic Formula oral capsule: 1 cap(s) orally 2 times a day (05 Aug 2023 01:19)  Prolia 60 mg/mL subcutaneous solution: subcutaneous every 6 months (05 Aug 2023 01:19)  simvastatin 20 mg oral tablet: 1 tab(s) orally once a day (at bedtime) (05 Aug 2023 01:19)  Trelegy Ellipta 100 mcg-62.5 mcg-25 mcg/inh inhalation powder: 1 puff(s) inhaled once a day (05 Aug 2023 01:19)  Vitamin D3 25 mcg (1000 intl units) oral tablet: 1 tab(s) orally once a day (05 Aug 2023 01:19)  zolpidem 5 mg oral tablet: 1 tab(s) orally once a day (at bedtime), As Needed (05 Aug 2023 01:19)      MEDICATIONS  (STANDING):  albuterol    0.083% 2.5 milliGRAM(s) Nebulizer every 6 hours  albuterol    90 MICROgram(s) HFA Inhaler 1 Puff(s) Inhalation every 4 hours  allopurinol 300 milliGRAM(s) Oral daily  dextrose 5%. 1000 milliLiter(s) (100 mL/Hr) IV Continuous <Continuous>  dextrose 5%. 1000 milliLiter(s) (50 mL/Hr) IV Continuous <Continuous>  dextrose 50% Injectable 25 Gram(s) IV Push once  dextrose 50% Injectable 12.5 Gram(s) IV Push once  dextrose 50% Injectable 25 Gram(s) IV Push once  diltiazem    milliGRAM(s) Oral daily  glucagon  Injectable 1 milliGRAM(s) IntraMuscular once  insulin lispro (ADMELOG) corrective regimen sliding scale   SubCutaneous three times a day before meals  metoprolol succinate ER 25 milliGRAM(s) Oral daily  simvastatin 20 milliGRAM(s) Oral at bedtime  tiotropium 2.5 MICROgram(s) Inhaler 2 Puff(s) Inhalation daily    MEDICATIONS  (PRN):  acetaminophen     Tablet .. 650 milliGRAM(s) Oral every 6 hours PRN Temp greater or equal to 38C (100.4F), Mild Pain (1 - 3)  aluminum hydroxide/magnesium hydroxide/simethicone Suspension 30 milliLiter(s) Oral every 4 hours PRN Dyspepsia  dextrose Oral Gel 15 Gram(s) Oral once PRN Blood Glucose LESS THAN 70 milliGRAM(s)/deciliter  dicyclomine 10 milliGRAM(s) Oral three times a day before meals PRN spasms  morphine  - Injectable 4 milliGRAM(s) IV Push every 3 hours PRN Severe Pain (7 - 10)  ondansetron Injectable 4 milliGRAM(s) IV Push every 6 hours PRN Nausea and/or Vomiting  oxycodone    5 mG/acetaminophen 325 mG 1 Tablet(s) Oral every 4 hours PRN Moderate Pain (4 - 6)  zolpidem 5 milliGRAM(s) Oral at bedtime PRN Insomnia      Allergies    aspirin (Stomach Upset)    Intolerances        SOCIAL HISTORY: Denies tobacco, etoh abuse or illicit drug use    FAMILY HISTORY:  FH: HTN (hypertension)  mother, father    FH: stroke  mother    FH: CHF (congestive heart failure)  father    FH: cancer  brother        Vital Signs Last 24 Hrs  T(C): 36.8 (05 Aug 2023 10:40), Max: 37.2 (05 Aug 2023 07:28)  T(F): 98.2 (05 Aug 2023 10:40), Max: 99 (05 Aug 2023 07:28)  HR: 89 (05 Aug 2023 10:40) (85 - 92)  BP: 149/57 (05 Aug 2023 10:40) (135/51 - 149/57)  BP(mean): 78 (05 Aug 2023 07:28) (78 - 78)  RR: 20 (05 Aug 2023 10:40) (18 - 20)  SpO2: 92% (05 Aug 2023 10:40) (91% - 98%)    Parameters below as of 05 Aug 2023 10:40  Patient On (Oxygen Delivery Method): nasal cannula  O2 Flow (L/min): 6          REVIEW OF SYSTEMS:    CONSTITUTIONAL:  As per HPI.  HEENT:  Eyes:  No diplopia or blurred vision. ENT:  No earache, sore throat or runny nose.  CARDIOVASCULAR:  No pressure, squeezing, tightness, heaviness or aching about the chest, neck, axilla or epigastrium.  RESPIRATORY:  No cough, shortness of breath, PND or orthopnea.  GASTROINTESTINAL:  No nausea, vomiting or diarrhea.  GENITOURINARY:  No dysuria, frequency or urgency.  MUSCULOSKELETAL:  As per HPI.  SKIN:  No change in skin, hair or nails.  NEUROLOGIC:  No paresthesias, fasciculations, seizures or weakness.  PSYCHIATRIC:  No disorder of thought or mood.  ENDOCRINE:  No heat or cold intolerance, polyuria or polydipsia.  HEMATOLOGICAL:  No easy bruising or bleedings:  .     PHYSICAL EXAMINATION:    GENERAL APPEARANCE:  Pt. is not currently dyspneic, in no distress. Pt. is alert, oriented, and pleasant.  HEENT:  Pupils are normal and react normally. No icterus. Mucous membranes well colored.  NECK:  Supple. No lymphadenopathy. Jugular venous pressure not elevated. Carotids equal.   HEART:   The cardiac impulse has a normal quality. Regular. Normal S1 and S2. There are no murmurs, rubs or gallops noted  CHEST:  Chest is clear to auscultation. Normal respiratory effort.  ABDOMEN:  Soft and nontender.   EXTREMITIES:  There is no cyanosis, clubbing or edema.   SKIN:  No rash or significant lesions are noted.    LABS:                        10.1   11.24 )-----------( 273      ( 05 Aug 2023 02:30 )             31.0     08-05    139  |  107  |  13  ----------------------------<  148<H>  4.0   |  27  |  1.11    Ca    9.1      05 Aug 2023 02:30  Mg     1.7     08-04    TPro  6.8  /  Alb  3.2<L>  /  TBili  0.3  /  DBili  x   /  AST  18  /  ALT  17  /  AlkPhos  78  08-04    LIVER FUNCTIONS - ( 04 Aug 2023 21:46 )  Alb: 3.2 g/dL / Pro: 6.8 gm/dL / ALK PHOS: 78 U/L / ALT: 17 U/L / AST: 18 U/L / GGT: x           PT/INR - ( 05 Aug 2023 02:30 )   PT: 13.2 sec;   INR: 1.17 ratio         PTT - ( 05 Aug 2023 02:30 )  PTT:35.7 sec      Urinalysis Basic - ( 05 Aug 2023 02:30 )    Color: x / Appearance: x / SG: x / pH: x  Gluc: 148 mg/dL / Ketone: x  / Bili: x / Urobili: x   Blood: x / Protein: x / Nitrite: x   Leuk Esterase: x / RBC: x / WBC x   Sq Epi: x / Non Sq Epi: x / Bacteria: x            RADIOLOGY & ADDITIONAL STUDIES:     CT Chest No Cont (08.04.23 @ 23:24) >  IMPRESSION:  Acute left femoral neck fracture.    Mild interlobular septal thickening may reflect mild pulmonary edema.   Scattered small foci of tree-in-bud nodularity in the rightupper and   middle lobes likely reflecting infectious/inflammatory small airways   disease or impacted distal airways.    CT Chest No Cont (08.04.23 @ 23:24) >  IMPRESSION:  Acute left femoral neck fracture.    Mild interlobular septal thickening may reflect mild pulmonary edema.   Scattered small foci of tree-in-bud nodularity in the rightupper and   middle lobes likely reflecting infectious/inflammatory small airways   disease or impacted distal airways.

## 2023-08-05 NOTE — DISCHARGE NOTE PROVIDER - NSDCFUADDAPPT_GEN_ALL_CORE_FT
3. BANDAGE: Remove MIGUEL ANGEL NO SOONER than POD7 (**add date**) and place a Mepilex Ag bandage over incision. May change sooner ONLY if MIGUEL ANGEL leaks or falls off. DO NOT REMOVE BANDAGE TO CHECK WOUND ON INTAKE.  4. STAPLES: There are NO Staples to remove. Sutures are Dissolvable.  5. SHOWER: Okay to shower so long as battery pack is kept dry.

## 2023-08-05 NOTE — PROGRESS NOTE ADULT - SUBJECTIVE AND OBJECTIVE BOX
Postop check s/p L hip melisa    Pt seen at bedside in PACU. No acute complaints. Pain is well managed. Denies numbness, tingling, fevers, chills. Per anesthesia, decision made for pt to transfer to step down for observation after PACU.     Vital Signs (24 Hrs):  T(C): 36.2 (08-05-23 @ 16:26), Max: 37.2 (08-05-23 @ 07:28)  HR: 98 (08-05-23 @ 17:15) (85 - 100)  BP: 146/63 (08-05-23 @ 17:15) (131/57 - 155/65)  RR: 20 (08-05-23 @ 17:15) (18 - 24)  SpO2: 90% (08-05-23 @ 17:15) (85% - 98%)  Wt(kg): --    LABS:                          10.1   11.24 )-----------( 273      ( 05 Aug 2023 02:30 )             31.0     08-05    139  |  107  |  13  ----------------------------<  148<H>  4.0   |  27  |  1.11    Ca    9.1      05 Aug 2023 02:30  Mg     1.7     08-04    TPro  6.8  /  Alb  3.2<L>  /  TBili  0.3  /  DBili  x   /  AST  18  /  ALT  17  /  AlkPhos  78  08-04    LIVER FUNCTIONS - ( 04 Aug 2023 21:46 )  Alb: 3.2 g/dL / Pro: 6.8 gm/dL / ALK PHOS: 78 U/L / ALT: 17 U/L / AST: 18 U/L / GGT: x           PT/INR - ( 05 Aug 2023 02:30 )   PT: 13.2 sec;   INR: 1.17 ratio         PTT - ( 05 Aug 2023 02:30 )  PTT:35.7 sec    Physical Exam:  General: NAD, pt laying in bed comfortably with nebulizer  Compartments soft, compressible  Calves nontender  SCDs in place BL  Abduction pillow in place    LLE:  Dressings C/D/I  Motor: +GSC, TA, EHL, FHL  SILT: SPN, DPN, Tib, Saph, Mandi  +DP    A/P  Pt is a 82F POD0 L hip melisa    WBAT, posterior hip precautions  Abduction pillow in place when laying in bed or sitting  PT/OT  Analgesics  DVT ppx per primary team, can restart Lovenox POD1  Followup AM labs  Rest, compress, ice, elevate extremity as needed  Incentive spirometry  Per anesthesia, pt will transfer to step down after PACU for observation  Appreciate medical recs  Discussed plan with Dr. Cárdenas who agrees with above

## 2023-08-05 NOTE — CONSULT NOTE ADULT - ASSESSMENT
PROBLEMS:    S/P Left hip fx  Paroxysmal Afib-on eliquis  Severe COPD-ON 6liter NC home oxygen  T2DM  HTN/HLD/DM  H/O HTN, HLD, DM, Paroxysmal Afib, COPD    PLAN:    PAT condition is optimized for ortho surgey- benefits out-weight the risk-d/w pat/ortho  Iv solu-medrols 40mgx1 before surgery  Pat on eliquis- ifeoma-operative bleeding risk should out-weight  Nebs  Prn analgesia  [id number]  DVT ppx

## 2023-08-05 NOTE — DISCHARGE NOTE PROVIDER - NSDCFUADDINST_GEN_ALL_CORE_FT
Discharge Instructions for Hip Hemiarthroplasty:    1. ACTIVITY: WBAT. Rolling walker. Posterior Hip Dislocation Precautions. Abduction Pillow while in bed for 6 weeks. Daily PT.  2. CALL FOR: fever over 101, wound redness, drainage or open area, calf pain/calf swelling.  3. BANDAGE: Change dressing to a new bandage POD7. May change sooner if dressing saturated or falling off. DO NOT REMOVE BANDAGE TO CHECK WOUND ON INTAKE.  4. SHOWER: Okay to shower. Do not scrub dressing or submerge under water. No baths or hot tubs.   5. DVT PE Prophylaxis: Per primary reconciliation.  6. FOLLOW UP: Dr. Cárdenas in 2 weeks. Call to schedule.  7. MEDICATION: eRX sent to your pharmacy for  if you go home.   8.**Call office if medications not covered under your insurance, especially BLOOD CLOT PREVENTION/anticoagulant medication. Discharge Instructions for Left Hip Hemiarthroplasty:    1. PAIN CONTROL: See Med Rec.  2. ACTIVITY: Weight Bearing as Tolerated with assistance and rolling walker. Posterior Hip Precautions. Abduction pillow while in bed or chair for 6 weeks.  3. PT: daily, Posterior Hip Precautions.  4. DVT/PE PROPHYLAXIS: Continue DVT/PE Prophylaxis. See Med Rec for Duration and dose.  5. BANDAGE: Remove MIGUEL ANGEL NO SOONER than POD7 (8/12/23) and place a Mepilex Ag bandage over incision. May change sooner ONLY if MIGUEL ANGEL leaks or falls off. DO NOT REMOVE BANDAGE TO CHECK WOUND ON INTAKE.  6. STAPLES: There are NO Staples to remove. Sutures are Dissolvable.  7. SHOWER: Okay to shower so long as battery pack is kept dry.  8. FOLLOW UP: Follow-up with Orthopedic Surgeon Dr. Cárdenas in 14 Days. Call Office For Appointment.

## 2023-08-05 NOTE — DISCHARGE NOTE PROVIDER - NSDCMRMEDTOKEN_GEN_ALL_CORE_FT
allopurinol 300 mg oral tablet: 1 tab(s) orally once a day  Apriso 0.375 g oral capsule, extended release: 4 cap(s) orally once a day  Cartia  mg/24 hours oral capsule, extended release: 1 cap(s) orally once a day  CoQ10: orally once a day  dicyclomine 10 mg oral capsule: 1 cap(s) orally 3 times a day, As Needed  Eliquis 5 mg oral tablet: 1 tab(s) orally 2 times a day  metFORMIN 500 mg oral tablet: 2 tab(s) orally once a day (in the morning)  metFORMIN 500 mg oral tablet: 1 tab(s) orally once (at bedtime)  Metoprolol Succinate ER 25 mg oral tablet, extended release: 1 tab(s) orally once a day  Pepcid Complete: *** as needed***  Probiotic Formula oral capsule: 1 cap(s) orally 2 times a day  Prolia 60 mg/mL subcutaneous solution: subcutaneous every 6 months  simvastatin 20 mg oral tablet: 1 tab(s) orally once a day (at bedtime)  Trelegy Ellipta 100 mcg-62.5 mcg-25 mcg/inh inhalation powder: 1 puff(s) inhaled once a day  Vitamin D3 25 mcg (1000 intl units) oral tablet: 1 tab(s) orally once a day  zolpidem 5 mg oral tablet: 1 tab(s) orally once a day (at bedtime), As Needed   allopurinol 300 mg oral tablet: 1 tab(s) orally once a day  Apriso 0.375 g oral capsule, extended release: 4 cap(s) orally once a day  Cartia  mg/24 hours oral capsule, extended release: 1 cap(s) orally once a day  CoQ10: orally once a day  dicyclomine 10 mg oral capsule: 1 cap(s) orally 3 times a day, As Needed  Eliquis 5 mg oral tablet: 1 tab(s) orally 2 times a day  furosemide 20 mg oral tablet: 1 tab(s) orally every other day  metFORMIN 500 mg oral tablet: 2 tab(s) orally once a day (in the morning)  metFORMIN 500 mg oral tablet: 1 tab(s) orally once (at bedtime)  Metoprolol Succinate ER 25 mg oral tablet, extended release: 1 tab(s) orally once a day  Pepcid Complete: *** as needed***  Probiotic Formula oral capsule: 1 cap(s) orally 2 times a day  Prolia 60 mg/mL subcutaneous solution: subcutaneous every 6 months  simvastatin 20 mg oral tablet: 1 tab(s) orally once a day (at bedtime)  Trelegy Ellipta 100 mcg-62.5 mcg-25 mcg/inh inhalation powder: 1 puff(s) inhaled once a day  Vitamin D3 25 mcg (1000 intl units) oral tablet: 1 tab(s) orally once a day  zolpidem 5 mg oral tablet: 1 tab(s) orally once a day (at bedtime), As Needed

## 2023-08-05 NOTE — PATIENT PROFILE ADULT - FALL HARM RISK - HARM RISK INTERVENTIONS

## 2023-08-05 NOTE — DISCHARGE NOTE PROVIDER - HOSPITAL COURSE
Orthopedic Summary  H&P:  Pt is a 82y Female    PAST MEDICAL & SURGICAL HISTORY:  HTN (hypertension)      HLD (hyperlipidemia)      DM (diabetes mellitus)      Paroxysmal A-fib  on Eliquis      ANU (obstructive sleep apnea)  on CPAP      H/O gastroesophageal reflux (GERD)      History of ulcerative colitis      S/P appendectomy            Now s/p Left Hip Hemiarthroplasty for fracture. Pt is afebrile with stable vital signs. Pain is controlled. Exam reveals intact EHL FHL TA GS, +DP. Dressing is clean and dry.    Hospital Course:  Patient presented to [facility] ED after a fall, found to have a left hip fracture, and admitted to the Medical Service. Pt was medically/cardiac/pulmonary cleared prior to surgery. Prophylactic antibiotics were started before the procedure and continued for 24 hours. They were admitted after surgery to the orthopedic floor.  There were no orthopedic complications during the hospital stay. All home medications were continued.    Routine consults were obtained from the Anticoagulation Team for DVT/PE prophylaxis, from Physical Therapy, and followed by Medicine for Co-management. Patient was placed on  anticoagulation.  Pertinent home medications were continued.  Daily labs were followed.      On POD 0 there were no major issues. Pt received PT daily and was Discharged once cleared per Medicine.  The orthopedic Attending is aware and agrees. See addendum to DC summary per medical team below for any additional info or if any changes. Orthopedic Summary  H&P:  Pt is a 82y Female    PAST MEDICAL & SURGICAL HISTORY:  HTN (hypertension)      HLD (hyperlipidemia)      DM (diabetes mellitus)      Paroxysmal A-fib  on Eliquis      ANU (obstructive sleep apnea)  on CPAP      H/O gastroesophageal reflux (GERD)      History of ulcerative colitis      S/P appendectomy            Now s/p Left Hip Hemiarthroplasty for fracture. Pt is afebrile with stable vital signs. Pain is controlled. Exam reveals intact EHL FHL TA GS, +DP. Dressing is clean and dry.    Hospital Course:  Patient presented to [facility] ED after a fall, found to have a left hip fracture, and admitted to the Medical Service. Pt was medically/cardiac/pulmonary cleared prior to surgery. Prophylactic antibiotics were started before the procedure and continued for 24 hours. They were admitted after surgery to the orthopedic floor.  There were no orthopedic complications during the hospital stay. All home medications were continued.    Routine consults were obtained from the Anticoagulation Team for DVT/PE prophylaxis, from Physical Therapy, and followed by Medicine for Co-management. Patient was placed on  anticoagulation.  Pertinent home medications were continued.  Daily labs were followed.      On POD 0 there were no major issues. Pt received PT daily and was Discharged once cleared per Medicine.  The orthopedic Attending is aware and agrees. See addendum to DC summary per medical team below for any additional info or if any changes.    Medicine Addendum   * Left hip fx  POD#5  aerobic bottle GPC, probably contaminant   repeat bl cx: negative     * Abnormal CXR/ chf- diastolic; moderate AS  Lasix     * ABLA  s/p prbc; needs to remain hospitalize to ensure stable HH has severe COPD with 6L O2 at home    #T2DM  - ISS  - Monitor glucose, A1C= 6.4 well ctr at home    #HTN, HLD, DM, Paroxysmal Afib, severe COPD on 6L NC at home, moderate AS  - Continue with home meds- c/w Eliquis

## 2023-08-05 NOTE — H&P ADULT - ASSESSMENT
* Left hip fx  high risk for periop complications severe COPD, radiological evidence of CHF, loud murmur  cardio and pulm consult  nebs  prn analgesia      #T2DM  - ISS  - Monitor glucose, A1C    #HTN, HLD, DM, Paroxysmal Afib, COPD  - Continue with home meds- Eliquis on hold    #DVT ppx  LMWH

## 2023-08-05 NOTE — H&P ADULT - NSHPLABSRESULTS_GEN_ALL_CORE
10.1   11.24 )-----------( 273      ( 05 Aug 2023 02:30 )             31.0   08-05    139  |  107  |  13  ----------------------------<  148<H>  4.0   |  27  |  1.11    Ca    9.1      05 Aug 2023 02:30  Mg     1.7     08-04    TPro  6.8  /  Alb  3.2<L>  /  TBili  0.3  /  DBili  x   /  AST  18  /  ALT  17  /  AlkPhos  78  08-04     CT Abdomen and Pelvis No Cont (08.04.23 @ 23:24) >    Acute left femoral neck fracture.    Mild interlobular septal thickening may reflect mild pulmonary edema.   Scattered small foci of tree-in-bud nodularity in the right upper and   middle lobes likely reflecting infectious/inflammatory small airways   disease or impacted distal airways.      ekg rbbb

## 2023-08-05 NOTE — DISCHARGE NOTE PROVIDER - NSDCQMSTROKE_NEU_ALL_CORE
LION was reviewed today per office protocol. Report shows No discrepancies. Fill pattern is consistent from single provider(s) at single pharmacy(s). Prescription escribed.  Patient is aware to check within the pharmacy No

## 2023-08-06 LAB
ANION GAP SERPL CALC-SCNC: 5 MMOL/L — SIGNIFICANT CHANGE UP (ref 5–17)
BASOPHILS # BLD AUTO: 0.02 K/UL — SIGNIFICANT CHANGE UP (ref 0–0.2)
BASOPHILS NFR BLD AUTO: 0.2 % — SIGNIFICANT CHANGE UP (ref 0–2)
BUN SERPL-MCNC: 18 MG/DL — SIGNIFICANT CHANGE UP (ref 7–23)
CALCIUM SERPL-MCNC: 9.1 MG/DL — SIGNIFICANT CHANGE UP (ref 8.5–10.1)
CHLORIDE SERPL-SCNC: 104 MMOL/L — SIGNIFICANT CHANGE UP (ref 96–108)
CO2 SERPL-SCNC: 28 MMOL/L — SIGNIFICANT CHANGE UP (ref 22–31)
CREAT SERPL-MCNC: 1.22 MG/DL — SIGNIFICANT CHANGE UP (ref 0.5–1.3)
EGFR: 44 ML/MIN/1.73M2 — LOW
EOSINOPHIL # BLD AUTO: 0 K/UL — SIGNIFICANT CHANGE UP (ref 0–0.5)
EOSINOPHIL NFR BLD AUTO: 0 % — SIGNIFICANT CHANGE UP (ref 0–6)
GLUCOSE BLDC GLUCOMTR-MCNC: 161 MG/DL — HIGH (ref 70–99)
GLUCOSE BLDC GLUCOMTR-MCNC: 179 MG/DL — HIGH (ref 70–99)
GLUCOSE BLDC GLUCOMTR-MCNC: 184 MG/DL — HIGH (ref 70–99)
GLUCOSE BLDC GLUCOMTR-MCNC: 206 MG/DL — HIGH (ref 70–99)
GLUCOSE SERPL-MCNC: 172 MG/DL — HIGH (ref 70–99)
HCT VFR BLD CALC: 27.9 % — LOW (ref 34.5–45)
HGB BLD-MCNC: 8.5 G/DL — LOW (ref 11.5–15.5)
IMM GRANULOCYTES NFR BLD AUTO: 0.4 % — SIGNIFICANT CHANGE UP (ref 0–0.9)
LYMPHOCYTES # BLD AUTO: 0.28 K/UL — LOW (ref 1–3.3)
LYMPHOCYTES # BLD AUTO: 2.4 % — LOW (ref 13–44)
MCHC RBC-ENTMCNC: 24.3 PG — LOW (ref 27–34)
MCHC RBC-ENTMCNC: 30.5 GM/DL — LOW (ref 32–36)
MCV RBC AUTO: 79.7 FL — LOW (ref 80–100)
MONOCYTES # BLD AUTO: 0.72 K/UL — SIGNIFICANT CHANGE UP (ref 0–0.9)
MONOCYTES NFR BLD AUTO: 6.1 % — SIGNIFICANT CHANGE UP (ref 2–14)
NEUTROPHILS # BLD AUTO: 10.73 K/UL — HIGH (ref 1.8–7.4)
NEUTROPHILS NFR BLD AUTO: 90.9 % — HIGH (ref 43–77)
PLATELET # BLD AUTO: 228 K/UL — SIGNIFICANT CHANGE UP (ref 150–400)
POTASSIUM SERPL-MCNC: 5.3 MMOL/L — SIGNIFICANT CHANGE UP (ref 3.5–5.3)
POTASSIUM SERPL-SCNC: 5.3 MMOL/L — SIGNIFICANT CHANGE UP (ref 3.5–5.3)
RBC # BLD: 3.5 M/UL — LOW (ref 3.8–5.2)
RBC # FLD: 23.6 % — HIGH (ref 10.3–14.5)
SODIUM SERPL-SCNC: 137 MMOL/L — SIGNIFICANT CHANGE UP (ref 135–145)
WBC # BLD: 11.8 K/UL — HIGH (ref 3.8–10.5)
WBC # FLD AUTO: 11.8 K/UL — HIGH (ref 3.8–10.5)

## 2023-08-06 PROCEDURE — 99233 SBSQ HOSP IP/OBS HIGH 50: CPT

## 2023-08-06 RX ORDER — BUDESONIDE AND FORMOTEROL FUMARATE DIHYDRATE 160; 4.5 UG/1; UG/1
2 AEROSOL RESPIRATORY (INHALATION)
Refills: 0 | Status: DISCONTINUED | OUTPATIENT
Start: 2023-08-06 | End: 2023-08-10

## 2023-08-06 RX ORDER — HYDROMORPHONE HYDROCHLORIDE 2 MG/ML
1 INJECTION INTRAMUSCULAR; INTRAVENOUS; SUBCUTANEOUS EVERY 4 HOURS
Refills: 0 | Status: DISCONTINUED | OUTPATIENT
Start: 2023-08-06 | End: 2023-08-10

## 2023-08-06 RX ORDER — OXYCODONE AND ACETAMINOPHEN 5; 325 MG/1; MG/1
1 TABLET ORAL EVERY 4 HOURS
Refills: 0 | Status: DISCONTINUED | OUTPATIENT
Start: 2023-08-06 | End: 2023-08-06

## 2023-08-06 RX ORDER — OXYCODONE HYDROCHLORIDE 5 MG/1
5 TABLET ORAL EVERY 4 HOURS
Refills: 0 | Status: DISCONTINUED | OUTPATIENT
Start: 2023-08-06 | End: 2023-08-10

## 2023-08-06 RX ADMIN — Medication 240 MILLIGRAM(S): at 09:47

## 2023-08-06 RX ADMIN — ALBUTEROL 2.5 MILLIGRAM(S): 90 AEROSOL, METERED ORAL at 09:11

## 2023-08-06 RX ADMIN — OXYCODONE HYDROCHLORIDE 5 MILLIGRAM(S): 5 TABLET ORAL at 05:30

## 2023-08-06 RX ADMIN — MORPHINE SULFATE 4 MILLIGRAM(S): 50 CAPSULE, EXTENDED RELEASE ORAL at 10:05

## 2023-08-06 RX ADMIN — OXYCODONE HYDROCHLORIDE 5 MILLIGRAM(S): 5 TABLET ORAL at 12:50

## 2023-08-06 RX ADMIN — ALBUTEROL 2.5 MILLIGRAM(S): 90 AEROSOL, METERED ORAL at 20:28

## 2023-08-06 RX ADMIN — MORPHINE SULFATE 4 MILLIGRAM(S): 50 CAPSULE, EXTENDED RELEASE ORAL at 09:47

## 2023-08-06 RX ADMIN — ONDANSETRON 4 MILLIGRAM(S): 8 TABLET, FILM COATED ORAL at 05:03

## 2023-08-06 RX ADMIN — Medication 975 MILLIGRAM(S): at 21:21

## 2023-08-06 RX ADMIN — Medication 1: at 13:56

## 2023-08-06 RX ADMIN — ALBUTEROL 2.5 MILLIGRAM(S): 90 AEROSOL, METERED ORAL at 14:28

## 2023-08-06 RX ADMIN — ALBUTEROL 2.5 MILLIGRAM(S): 90 AEROSOL, METERED ORAL at 02:48

## 2023-08-06 RX ADMIN — HYDROMORPHONE HYDROCHLORIDE 1 MILLIGRAM(S): 2 INJECTION INTRAMUSCULAR; INTRAVENOUS; SUBCUTANEOUS at 21:36

## 2023-08-06 RX ADMIN — Medication 300 MILLIGRAM(S): at 09:47

## 2023-08-06 RX ADMIN — ENOXAPARIN SODIUM 40 MILLIGRAM(S): 100 INJECTION SUBCUTANEOUS at 09:47

## 2023-08-06 RX ADMIN — HYDROMORPHONE HYDROCHLORIDE 1 MILLIGRAM(S): 2 INJECTION INTRAMUSCULAR; INTRAVENOUS; SUBCUTANEOUS at 13:51

## 2023-08-06 RX ADMIN — HYDROMORPHONE HYDROCHLORIDE 1 MILLIGRAM(S): 2 INJECTION INTRAMUSCULAR; INTRAVENOUS; SUBCUTANEOUS at 14:05

## 2023-08-06 RX ADMIN — OXYCODONE HYDROCHLORIDE 5 MILLIGRAM(S): 5 TABLET ORAL at 11:55

## 2023-08-06 RX ADMIN — Medication 25 MILLIGRAM(S): at 09:46

## 2023-08-06 RX ADMIN — Medication 1: at 18:56

## 2023-08-06 RX ADMIN — Medication 1: at 08:27

## 2023-08-06 RX ADMIN — TIOTROPIUM BROMIDE 2 PUFF(S): 18 CAPSULE ORAL; RESPIRATORY (INHALATION) at 14:24

## 2023-08-06 RX ADMIN — SIMVASTATIN 20 MILLIGRAM(S): 20 TABLET, FILM COATED ORAL at 21:21

## 2023-08-06 RX ADMIN — Medication 975 MILLIGRAM(S): at 05:03

## 2023-08-06 RX ADMIN — Medication 975 MILLIGRAM(S): at 14:05

## 2023-08-06 RX ADMIN — Medication 3 MILLIGRAM(S): at 21:21

## 2023-08-06 RX ADMIN — Medication 975 MILLIGRAM(S): at 05:33

## 2023-08-06 RX ADMIN — Medication 100 MILLIGRAM(S): at 05:02

## 2023-08-06 RX ADMIN — BUDESONIDE AND FORMOTEROL FUMARATE DIHYDRATE 2 PUFF(S): 160; 4.5 AEROSOL RESPIRATORY (INHALATION) at 20:28

## 2023-08-06 RX ADMIN — OXYCODONE HYDROCHLORIDE 5 MILLIGRAM(S): 5 TABLET ORAL at 00:41

## 2023-08-06 RX ADMIN — OXYCODONE HYDROCHLORIDE 5 MILLIGRAM(S): 5 TABLET ORAL at 05:02

## 2023-08-06 RX ADMIN — SENNA PLUS 2 TABLET(S): 8.6 TABLET ORAL at 21:20

## 2023-08-06 RX ADMIN — OXYCODONE HYDROCHLORIDE 5 MILLIGRAM(S): 5 TABLET ORAL at 01:20

## 2023-08-06 RX ADMIN — Medication 975 MILLIGRAM(S): at 13:56

## 2023-08-06 RX ADMIN — HYDROMORPHONE HYDROCHLORIDE 1 MILLIGRAM(S): 2 INJECTION INTRAMUSCULAR; INTRAVENOUS; SUBCUTANEOUS at 21:21

## 2023-08-06 NOTE — PHYSICAL THERAPY INITIAL EVALUATION ADULT - GENERAL OBSERVATIONS, REHAB EVAL
Pt rec'd supine in bed, abd pillow secured, pt pleasant and cooperative with PT, on 6L O2 via nc (chr), dtr present at bedside, dressing to left hip C/D/I.

## 2023-08-06 NOTE — PHYSICAL THERAPY INITIAL EVALUATION ADULT - RANGE OF MOTION EXAMINATION, REHAB EVAL
left hip WFL within THPs/bilateral upper extremity ROM was WFL (within functional limits)/bilateral lower extremity ROM was WFL (within functional limits)

## 2023-08-06 NOTE — PROGRESS NOTE ADULT - SUBJECTIVE AND OBJECTIVE BOX
Patient is a 82y old  Female who presents with a chief complaint of s/p fall with left hip fracture (05 Aug 2023 11:25)    8/5/2023-Cardiology Consultation: Seen for Dr Way. Seen for preop cardiac evaluation. She tripped and fell on 8/4/23 and has a left hip fracture.    She has severe oxygen dependant COPD, ANU, HBP, HLD, DM, RBBB, PAF with one episode many years ago. Last dose of Eliquis on 8/4 at 4 PM.  Currently denies chest pain, syncope  or palpitations. Chronic dyspnea on exertion is stable per the patient.     Followup HPI:  8/6- No complaints. S/P ORIF left femoral neck fracture 8/5.      PAST MEDICAL & SURGICAL HISTORY:  HTN (hypertension)      HLD (hyperlipidemia)      DM (diabetes mellitus)      Paroxysmal A-fib  on Eliquis      ANU (obstructive sleep apnea)  on CPAP      H/O gastroesophageal reflux (GERD)      History of ulcerative colitis      S/P appendectomy          Review of symptoms:  Negative except for as noted in today's HPI.      MEDICATIONS  (STANDING):  acetaminophen     Tablet .. 975 milliGRAM(s) Oral every 8 hours  albuterol    0.083% 2.5 milliGRAM(s) Nebulizer every 6 hours  albuterol    90 MICROgram(s) HFA Inhaler 1 Puff(s) Inhalation every 4 hours  allopurinol 300 milliGRAM(s) Oral daily  dextrose 5%. 1000 milliLiter(s) (100 mL/Hr) IV Continuous <Continuous>  dextrose 5%. 1000 milliLiter(s) (50 mL/Hr) IV Continuous <Continuous>  dextrose 50% Injectable 25 Gram(s) IV Push once  dextrose 50% Injectable 12.5 Gram(s) IV Push once  dextrose 50% Injectable 25 Gram(s) IV Push once  diltiazem    milliGRAM(s) Oral daily  enoxaparin Injectable 40 milliGRAM(s) SubCutaneous every 24 hours  glucagon  Injectable 1 milliGRAM(s) IntraMuscular once  insulin lispro (ADMELOG) corrective regimen sliding scale   SubCutaneous three times a day before meals  metoprolol succinate ER 25 milliGRAM(s) Oral daily  polyethylene glycol 3350 17 Gram(s) Oral daily  senna 2 Tablet(s) Oral at bedtime  simvastatin 20 milliGRAM(s) Oral at bedtime  tiotropium 2.5 MICROgram(s) Inhaler 2 Puff(s) Inhalation daily  tranexamic acid IVPB 1000 milliGRAM(s) IV Intermittent once  tranexamic acid IVPB 1000 milliGRAM(s) IV Intermittent once    MEDICATIONS  (PRN):  aluminum hydroxide/magnesium hydroxide/simethicone Suspension 30 milliLiter(s) Oral every 4 hours PRN Dyspepsia  dextrose Oral Gel 15 Gram(s) Oral once PRN Blood Glucose LESS THAN 70 milliGRAM(s)/deciliter  dicyclomine 10 milliGRAM(s) Oral three times a day before meals PRN spasms  magnesium hydroxide Suspension 30 milliLiter(s) Oral daily PRN Constipation  melatonin 3 milliGRAM(s) Oral at bedtime PRN Insomnia  morphine  - Injectable 4 milliGRAM(s) IV Push every 3 hours PRN Severe Pain (7 - 10)  ondansetron Injectable 4 milliGRAM(s) IV Push every 6 hours PRN Nausea and/or Vomiting  oxyCODONE    IR 2.5 milliGRAM(s) Oral every 4 hours PRN Moderate Pain (4 - 6)  oxyCODONE    IR 5 milliGRAM(s) Oral every 4 hours PRN Severe Pain (7 - 10)  zolpidem 5 milliGRAM(s) Oral at bedtime PRN Insomnia          Vital Signs Last 24 Hrs  T(C): 36.5 (06 Aug 2023 05:35), Max: 36.9 (05 Aug 2023 18:06)  T(F): 97.7 (06 Aug 2023 05:35), Max: 98.4 (05 Aug 2023 18:06)  HR: 96 (06 Aug 2023 06:00) (86 - 100)  BP: 138/57 (06 Aug 2023 06:00) (115/62 - 155/65)  BP(mean): 80 (06 Aug 2023 06:00) (73 - 85)  RR: 22 (06 Aug 2023 06:00) (13 - 27)  SpO2: 89% (06 Aug 2023 06:00) (85% - 98%)    Parameters below as of 06 Aug 2023 05:35  Patient On (Oxygen Delivery Method): mask, Venturi  O2 Flow (L/min): 50      I&O's Summary    05 Aug 2023 07:01  -  06 Aug 2023 07:00  --------------------------------------------------------  IN: 700 mL / OUT: 1025 mL / NET: -325 mL        PHYSICAL EXAM  General Appearance: comfortable  HEENT:   Neck:   Lungs: clear  Heart: 2/6 JOSE ANGEL base  Abdomen: nontender  Extremities: bandage left hip area  Neurologic:       INTERPRETATION OF TELEMETRY: RSR 80s-100.    ECG:    LABS:                          8.5    11.80 )-----------( 228      ( 06 Aug 2023 06:00 )             27.9     08-06    137  |  104  |  18  ----------------------------<  172<H>  5.3   |  28  |  1.22    Ca    9.1      06 Aug 2023 06:00  Mg     1.7     08-04    TPro  6.8  /  Alb  3.2<L>  /  TBili  0.3  /  DBili  x   /  AST  18  /  ALT  17  /  AlkPhos  78  08-04            PT/INR - ( 05 Aug 2023 02:30 )   PT: 13.2 sec;   INR: 1.17 ratio         PTT - ( 05 Aug 2023 02:30 )  PTT:35.7 sec  Urinalysis Basic - ( 06 Aug 2023 06:00 )    Color: x / Appearance: x / SG: x / pH: x  Gluc: 172 mg/dL / Ketone: x  / Bili: x / Urobili: x   Blood: x / Protein: x / Nitrite: x   Leuk Esterase: x / RBC: x / WBC x   Sq Epi: x / Non Sq Epi: x / Bacteria: x            RADIOLOGY & ADDITIONAL STUDIES:    IMPRESSION:    1. Left femoral neck fracture. Stable S/P ORIF on 8/5.  2. Moderate aortic stenosis. Requires no treatment.  3.PAF.  Maintaining RSR. Continue metoprolol and resume oral anticoagulation when OK with orthopedist.   4. HBP. Continue metoprolol, diltiazem.  5.. HLD. Continue statin.  6.COPD. Continue oxygen, inhalers.  8. DM. Insulin coverage  PLAN: As above.

## 2023-08-06 NOTE — PROGRESS NOTE ADULT - ASSESSMENT
PROBLEMS:    S/P Left hip fx  Paroxysmal Afib-on eliquis  Severe COPD-ON 6liter NC home oxygen  T2DM  HTN/HLD/DM  H/O HTN, HLD, DM, Paroxysmal Afib, COPD    PLAN:    S/P ORIF, fracture, femur, neck, left-melisa hip arthroplasty   TITRATE FIO2  ANTI-COAGULANTS AS PER ORTHO  Nebs/AEROSOLS  Prn analgesia  [id number]  DVT ppx

## 2023-08-06 NOTE — PROGRESS NOTE ADULT - SUBJECTIVE AND OBJECTIVE BOX
Pt seen at bedside this AM. No acute complaints. Pain is well managed. Denies numbness, tingling, fevers, chills, CP, or SOB.    Vital Signs (24 Hrs):  T(C): 36.5 (08-06-23 @ 05:35), Max: 36.9 (08-05-23 @ 18:06)  HR: 96 (08-06-23 @ 06:00) (86 - 100)  BP: 138/57 (08-06-23 @ 06:00) (115/62 - 155/65)  RR: 22 (08-06-23 @ 06:00) (13 - 27)  SpO2: 89% (08-06-23 @ 06:00) (85% - 98%)  Wt(kg): --    LABS:                          10.3   23.12 )-----------( 384      ( 05 Aug 2023 17:16 )             33.6     08-06    137  |  104  |  18  ----------------------------<  172<H>  5.3   |  28  |  1.22    Ca    9.1      06 Aug 2023 06:00  Mg     1.7     08-04    TPro  6.8  /  Alb  3.2<L>  /  TBili  0.3  /  DBili  x   /  AST  18  /  ALT  17  /  AlkPhos  78  08-04    LIVER FUNCTIONS - ( 04 Aug 2023 21:46 )  Alb: 3.2 g/dL / Pro: 6.8 gm/dL / ALK PHOS: 78 U/L / ALT: 17 U/L / AST: 18 U/L / GGT: x           PT/INR - ( 05 Aug 2023 02:30 )   PT: 13.2 sec;   INR: 1.17 ratio         PTT - ( 05 Aug 2023 02:30 )  PTT:35.7 sec    Physical Exam:  General: NAD, pt laying in bed comfortably with nebulizer  Compartments soft, compressible  Calves nontender  SCDs in place  Abduction pillow in place    LLE:  Dressings C/D/I  Motor: +GSC, TA, EHL, FHL  SILT: SPN, DPN, Tib, Saph, Mandi  +DP    A/P  Pt is a 82F POD1 L hip melisa    WBAT, posterior hip precautions  Abduction pillow in place when laying in bed or sitting  PT/OT  Analgesics  DVT ppx per primary team, can restart Lovenox POD1  Followup AM labs  Rest, compress, ice, elevate extremity as needed  Incentive spirometry  Appreciate medical recs  Discussed plan with Dr. Cárdenas who agrees with above

## 2023-08-06 NOTE — PROGRESS NOTE ADULT - SUBJECTIVE AND OBJECTIVE BOX
CHIEF COMPLAINT/INTERVAL HISTORY:    Patient is a 82y old  Female who presents with a chief complaint of s/p fall with left hip fracture (05 Aug 2023 11:25)      HPI:  Pt is a 81 y/o female w/PMHx of HTN, HLD, DM, Paroxysmal Afib, ANU, COPD on 6L O2 at home, presents after fall c/o hip pain. Pt states she was walking in her house, tripped and fell onto L Hip. Pt noticed immediate pain and inability to ambulate s/p MF. No other orthopedic injuries or complaints. No HS/LOC. No hx of orthopedic surgeries in the past. Recently seen in ED on 6/22/23 for L Proximal humerus fx s/p MF, treated non-operatively. Hx of severe COPD on 6LNC at Home and AFib on Eliquis (Last Dose 6pm 8/4/23). (05 Aug 2023 07:44)      POD#1  ICU Vital Signs Last 24 Hrs  T(C): 36.5 (06 Aug 2023 05:35), Max: 36.9 (05 Aug 2023 18:06)  T(F): 97.7 (06 Aug 2023 05:35), Max: 98.4 (05 Aug 2023 18:06)  HR: 86 (06 Aug 2023 08:00) (86 - 100)  BP: 130/58 (06 Aug 2023 08:00) (115/62 - 155/65)  BP(mean): 80 (06 Aug 2023 08:00) (73 - 85)  ABP: --  ABP(mean): --  RR: 23 (06 Aug 2023 08:00) (13 - 27)  SpO2: 93% (06 Aug 2023 08:00) (85% - 98%)    O2 Parameters below as of 06 Aug 2023 05:35  Patient On (Oxygen Delivery Method): mask, Venturi  O2 Flow (L/min): 50            MEDICATIONS  (STANDING):  acetaminophen     Tablet .. 975 milliGRAM(s) Oral every 8 hours  albuterol    0.083% 2.5 milliGRAM(s) Nebulizer every 6 hours  albuterol    90 MICROgram(s) HFA Inhaler 1 Puff(s) Inhalation every 4 hours  allopurinol 300 milliGRAM(s) Oral daily  diltiazem    milliGRAM(s) Oral daily  enoxaparin Injectable 40 milliGRAM(s) SubCutaneous every 24 hours  glucagon  Injectable 1 milliGRAM(s) IntraMuscular once  insulin lispro (ADMELOG) corrective regimen sliding scale   SubCutaneous three times a day before meals  metoprolol succinate ER 25 milliGRAM(s) Oral daily  polyethylene glycol 3350 17 Gram(s) Oral daily  senna 2 Tablet(s) Oral at bedtime  simvastatin 20 milliGRAM(s) Oral at bedtime  tiotropium 2.5 MICROgram(s) Inhaler 2 Puff(s) Inhalation daily  tranexamic acid IVPB 1000 milliGRAM(s) IV Intermittent once  tranexamic acid IVPB 1000 milliGRAM(s) IV Intermittent once    MEDICATIONS  (PRN):  aluminum hydroxide/magnesium hydroxide/simethicone Suspension 30 milliLiter(s) Oral every 4 hours PRN Dyspepsia  dextrose Oral Gel 15 Gram(s) Oral once PRN Blood Glucose LESS THAN 70 milliGRAM(s)/deciliter  dicyclomine 10 milliGRAM(s) Oral three times a day before meals PRN spasms  magnesium hydroxide Suspension 30 milliLiter(s) Oral daily PRN Constipation  melatonin 3 milliGRAM(s) Oral at bedtime PRN Insomnia  morphine  - Injectable 4 milliGRAM(s) IV Push every 3 hours PRN Severe Pain (7 - 10)  ondansetron Injectable 4 milliGRAM(s) IV Push every 6 hours PRN Nausea and/or Vomiting  oxyCODONE    IR 2.5 milliGRAM(s) Oral every 4 hours PRN Moderate Pain (4 - 6)  oxyCODONE    IR 5 milliGRAM(s) Oral every 4 hours PRN Severe Pain (7 - 10)  zolpidem 5 milliGRAM(s) Oral at bedtime PRN Insomnia        PHYSICAL EXAM:    GENERAL: NAD, well-groomed, well-developed  NERVOUS SYSTEM:  Alert & Oriented X3, Motor Strength 5/5 B/L upper and lower extremities; DTRs 2+ intact and symmetric  CHEST/LUNG: Clear to auscultation bilaterally; No rales, rhonchi, wheezing, or rubs  HEART: Regular rate and rhythm; No murmurs, rubs, or gallops  ABDOMEN: Soft, Nontender, Nondistended; Bowel sounds present  EXTREMITIES:  2+ Peripheral Pulses, No clubbing, cyanosis, or edema    LABS:                        8.5    11.80 )-----------( 228      ( 06 Aug 2023 06:00 )             27.9     08-06    137  |  104  |  18  ----------------------------<  172<H>  5.3   |  28  |  1.22    Ca    9.1      06 Aug 2023 06:00  Mg     1.7     08-04    TPro  6.8  /  Alb  3.2<L>  /  TBili  0.3  /  DBili  x   /  AST  18  /  ALT  17  /  AlkPhos  78  08-04    PT/INR - ( 05 Aug 2023 02:30 )   PT: 13.2 sec;   INR: 1.17 ratio         PTT - ( 05 Aug 2023 02:30 )  PTT:35.7 sec  Urinalysis Basic - ( 06 Aug 2023 06:00 )    Color: x / Appearance: x / SG: x / pH: x  Gluc: 172 mg/dL / Ketone: x  / Bili: x / Urobili: x   Blood: x / Protein: x / Nitrite: x   Leuk Esterase: x / RBC: x / WBC x   Sq Epi: x / Non Sq Epi: x / Bacteria: x        CAPILLARY BLOOD GLUCOSE      POCT Blood Glucose.: 161 mg/dL (06 Aug 2023 08:18)  POCT Blood Glucose.: 206 mg/dL (06 Aug 2023 00:20)  POCT Blood Glucose.: 263 mg/dL (05 Aug 2023 16:37)      Left hip fx      #T2DM  - ISS  - Monitor glucose, A1C    #HTN, HLD, DM, Paroxysmal Afib, COPD  - Continue with home meds- Eliquis on hold    #DVT ppx  LMWH

## 2023-08-06 NOTE — PROGRESS NOTE ADULT - SUBJECTIVE AND OBJECTIVE BOX
Subjective:    pat s/p hip surgery, required increased oxygen 50% VM, no wheezing.    Home Medications:  allopurinol 300 mg oral tablet: 1 tab(s) orally once a day (05 Aug 2023 01:19)  Apriso 0.375 g oral capsule, extended release: 4 cap(s) orally once a day (05 Aug 2023 01:19)  Cartia  mg/24 hours oral capsule, extended release: 1 cap(s) orally once a day (05 Aug 2023 01:19)  CoQ10: orally once a day (05 Aug 2023 01:19)  dicyclomine 10 mg oral capsule: 1 cap(s) orally 3 times a day, As Needed (05 Aug 2023 01:19)  Eliquis 5 mg oral tablet: 1 tab(s) orally 2 times a day (05 Aug 2023 01:19)  metFORMIN 500 mg oral tablet: 2 tab(s) orally once a day (in the morning) (05 Aug 2023 01:19)  metFORMIN 500 mg oral tablet: 1 tab(s) orally once (at bedtime) (05 Aug 2023 01:19)  Metoprolol Succinate ER 25 mg oral tablet, extended release: 1 tab(s) orally once a day (05 Aug 2023 01:19)  Pepcid Complete: *** as needed*** (05 Aug 2023 04:34)  Probiotic Formula oral capsule: 1 cap(s) orally 2 times a day (05 Aug 2023 01:19)  Prolia 60 mg/mL subcutaneous solution: subcutaneous every 6 months (05 Aug 2023 01:19)  simvastatin 20 mg oral tablet: 1 tab(s) orally once a day (at bedtime) (05 Aug 2023 01:19)  Trelegy Ellipta 100 mcg-62.5 mcg-25 mcg/inh inhalation powder: 1 puff(s) inhaled once a day (05 Aug 2023 01:19)  Vitamin D3 25 mcg (1000 intl units) oral tablet: 1 tab(s) orally once a day (05 Aug 2023 01:19)  zolpidem 5 mg oral tablet: 1 tab(s) orally once a day (at bedtime), As Needed (05 Aug 2023 01:19)    MEDICATIONS  (STANDING):  acetaminophen     Tablet .. 975 milliGRAM(s) Oral every 8 hours  albuterol    0.083% 2.5 milliGRAM(s) Nebulizer every 6 hours  albuterol    90 MICROgram(s) HFA Inhaler 1 Puff(s) Inhalation every 4 hours  allopurinol 300 milliGRAM(s) Oral daily  dextrose 5%. 1000 milliLiter(s) (100 mL/Hr) IV Continuous <Continuous>  dextrose 5%. 1000 milliLiter(s) (50 mL/Hr) IV Continuous <Continuous>  dextrose 50% Injectable 25 Gram(s) IV Push once  dextrose 50% Injectable 12.5 Gram(s) IV Push once  dextrose 50% Injectable 25 Gram(s) IV Push once  diltiazem    milliGRAM(s) Oral daily  enoxaparin Injectable 40 milliGRAM(s) SubCutaneous every 24 hours  glucagon  Injectable 1 milliGRAM(s) IntraMuscular once  insulin lispro (ADMELOG) corrective regimen sliding scale   SubCutaneous three times a day before meals  metoprolol succinate ER 25 milliGRAM(s) Oral daily  polyethylene glycol 3350 17 Gram(s) Oral daily  senna 2 Tablet(s) Oral at bedtime  simvastatin 20 milliGRAM(s) Oral at bedtime  tiotropium 2.5 MICROgram(s) Inhaler 2 Puff(s) Inhalation daily  tranexamic acid IVPB 1000 milliGRAM(s) IV Intermittent once  tranexamic acid IVPB 1000 milliGRAM(s) IV Intermittent once    MEDICATIONS  (PRN):  aluminum hydroxide/magnesium hydroxide/simethicone Suspension 30 milliLiter(s) Oral every 4 hours PRN Dyspepsia  dextrose Oral Gel 15 Gram(s) Oral once PRN Blood Glucose LESS THAN 70 milliGRAM(s)/deciliter  dicyclomine 10 milliGRAM(s) Oral three times a day before meals PRN spasms  magnesium hydroxide Suspension 30 milliLiter(s) Oral daily PRN Constipation  melatonin 3 milliGRAM(s) Oral at bedtime PRN Insomnia  morphine  - Injectable 4 milliGRAM(s) IV Push every 3 hours PRN Severe Pain (7 - 10)  ondansetron Injectable 4 milliGRAM(s) IV Push every 6 hours PRN Nausea and/or Vomiting  oxyCODONE    IR 2.5 milliGRAM(s) Oral every 4 hours PRN Moderate Pain (4 - 6)  oxyCODONE    IR 5 milliGRAM(s) Oral every 4 hours PRN Severe Pain (7 - 10)  zolpidem 5 milliGRAM(s) Oral at bedtime PRN Insomnia      Allergies    aspirin (Stomach Upset)    Intolerances        Vital Signs Last 24 Hrs  T(C): 36.5 (06 Aug 2023 05:35), Max: 36.9 (05 Aug 2023 18:06)  T(F): 97.7 (06 Aug 2023 05:35), Max: 98.4 (05 Aug 2023 18:06)  HR: 86 (06 Aug 2023 08:00) (86 - 100)  BP: 130/58 (06 Aug 2023 08:00) (115/62 - 155/65)  BP(mean): 80 (06 Aug 2023 08:00) (73 - 85)  RR: 23 (06 Aug 2023 08:00) (13 - 27)  SpO2: 93% (06 Aug 2023 08:00) (85% - 98%)    Parameters below as of 06 Aug 2023 05:35  Patient On (Oxygen Delivery Method): mask, Venturi  O2 Flow (L/min): 50        PHYSICAL EXAMINATION:    NECK:  Supple. No lymphadenopathy. Jugular venous pressure not elevated. Carotids equal.   HEART:   The cardiac impulse has a normal quality. Reg., Nl S1 and S2.  There are no murmurs, rubs or gallops noted  CHEST:  Chest is clear to auscultation. Normal respiratory effort.  ABDOMEN:  Soft and nontender.   EXTREMITIES:  There is no edema.       LABS:                        8.5    11.80 )-----------( 228      ( 06 Aug 2023 06:00 )             27.9     08-06    137  |  104  |  18  ----------------------------<  172<H>  5.3   |  28  |  1.22    Ca    9.1      06 Aug 2023 06:00  Mg     1.7     08-04    TPro  6.8  /  Alb  3.2<L>  /  TBili  0.3  /  DBili  x   /  AST  18  /  ALT  17  /  AlkPhos  78  08-04    PT/INR - ( 05 Aug 2023 02:30 )   PT: 13.2 sec;   INR: 1.17 ratio         PTT - ( 05 Aug 2023 02:30 )  PTT:35.7 sec  Urinalysis Basic - ( 06 Aug 2023 06:00 )    Color: x / Appearance: x / SG: x / pH: x  Gluc: 172 mg/dL / Ketone: x  / Bili: x / Urobili: x   Blood: x / Protein: x / Nitrite: x   Leuk Esterase: x / RBC: x / WBC x   Sq Epi: x / Non Sq Epi: x / Bacteria: x

## 2023-08-07 LAB
24R-OH-CALCIDIOL SERPL-MCNC: 69.5 NG/ML — SIGNIFICANT CHANGE UP (ref 30–80)
ALBUMIN SERPL ELPH-MCNC: 2.7 G/DL — LOW (ref 3.3–5)
ANION GAP SERPL CALC-SCNC: 3 MMOL/L — LOW (ref 5–17)
APPEARANCE UR: CLEAR — SIGNIFICANT CHANGE UP
BACTERIA # UR AUTO: ABNORMAL
BASOPHILS # BLD AUTO: 0.03 K/UL — SIGNIFICANT CHANGE UP (ref 0–0.2)
BASOPHILS NFR BLD AUTO: 0.3 % — SIGNIFICANT CHANGE UP (ref 0–2)
BILIRUB UR-MCNC: NEGATIVE — SIGNIFICANT CHANGE UP
BUN SERPL-MCNC: 12 MG/DL — SIGNIFICANT CHANGE UP (ref 7–23)
CALCIUM SERPL-MCNC: 9 MG/DL — SIGNIFICANT CHANGE UP (ref 8.5–10.1)
CHLORIDE SERPL-SCNC: 104 MMOL/L — SIGNIFICANT CHANGE UP (ref 96–108)
CO2 SERPL-SCNC: 29 MMOL/L — SIGNIFICANT CHANGE UP (ref 22–31)
COLOR SPEC: YELLOW — SIGNIFICANT CHANGE UP
CREAT SERPL-MCNC: 0.87 MG/DL — SIGNIFICANT CHANGE UP (ref 0.5–1.3)
DIFF PNL FLD: ABNORMAL
EGFR: 66 ML/MIN/1.73M2 — SIGNIFICANT CHANGE UP
EOSINOPHIL # BLD AUTO: 0 K/UL — SIGNIFICANT CHANGE UP (ref 0–0.5)
EOSINOPHIL NFR BLD AUTO: 0 % — SIGNIFICANT CHANGE UP (ref 0–6)
EPI CELLS # UR: SIGNIFICANT CHANGE UP
GLUCOSE BLDC GLUCOMTR-MCNC: 167 MG/DL — HIGH (ref 70–99)
GLUCOSE BLDC GLUCOMTR-MCNC: 181 MG/DL — HIGH (ref 70–99)
GLUCOSE BLDC GLUCOMTR-MCNC: 186 MG/DL — HIGH (ref 70–99)
GLUCOSE SERPL-MCNC: 141 MG/DL — HIGH (ref 70–99)
GLUCOSE UR QL: NEGATIVE — SIGNIFICANT CHANGE UP
GRAN CASTS # UR COMP ASSIST: ABNORMAL /LPF
HCT VFR BLD CALC: 23.4 % — LOW (ref 34.5–45)
HCT VFR BLD CALC: 25.3 % — LOW (ref 34.5–45)
HGB BLD-MCNC: 7.2 G/DL — LOW (ref 11.5–15.5)
HGB BLD-MCNC: 7.7 G/DL — LOW (ref 11.5–15.5)
HYALINE CASTS # UR AUTO: ABNORMAL /LPF
IMM GRANULOCYTES NFR BLD AUTO: 0.6 % — SIGNIFICANT CHANGE UP (ref 0–0.9)
KETONES UR-MCNC: NEGATIVE — SIGNIFICANT CHANGE UP
LEUKOCYTE ESTERASE UR-ACNC: ABNORMAL
LYMPHOCYTES # BLD AUTO: 1.13 K/UL — SIGNIFICANT CHANGE UP (ref 1–3.3)
LYMPHOCYTES # BLD AUTO: 11.4 % — LOW (ref 13–44)
MCHC RBC-ENTMCNC: 24.4 PG — LOW (ref 27–34)
MCHC RBC-ENTMCNC: 30.8 GM/DL — LOW (ref 32–36)
MCV RBC AUTO: 79.3 FL — LOW (ref 80–100)
MONOCYTES # BLD AUTO: 0.9 K/UL — SIGNIFICANT CHANGE UP (ref 0–0.9)
MONOCYTES NFR BLD AUTO: 9.1 % — SIGNIFICANT CHANGE UP (ref 2–14)
NEUTROPHILS # BLD AUTO: 7.77 K/UL — HIGH (ref 1.8–7.4)
NEUTROPHILS NFR BLD AUTO: 78.6 % — HIGH (ref 43–77)
NITRITE UR-MCNC: NEGATIVE — SIGNIFICANT CHANGE UP
PH UR: 6 — SIGNIFICANT CHANGE UP (ref 5–8)
PLATELET # BLD AUTO: 229 K/UL — SIGNIFICANT CHANGE UP (ref 150–400)
POTASSIUM SERPL-MCNC: 4.6 MMOL/L — SIGNIFICANT CHANGE UP (ref 3.5–5.3)
POTASSIUM SERPL-SCNC: 4.6 MMOL/L — SIGNIFICANT CHANGE UP (ref 3.5–5.3)
PROT UR-MCNC: 15
RBC # BLD: 2.95 M/UL — LOW (ref 3.8–5.2)
RBC # FLD: 23.6 % — HIGH (ref 10.3–14.5)
RBC CASTS # UR COMP ASSIST: ABNORMAL /HPF (ref 0–4)
SODIUM SERPL-SCNC: 136 MMOL/L — SIGNIFICANT CHANGE UP (ref 135–145)
SP GR SPEC: 1.02 — SIGNIFICANT CHANGE UP (ref 1.01–1.02)
UROBILINOGEN FLD QL: NEGATIVE — SIGNIFICANT CHANGE UP
WBC # BLD: 9.89 K/UL — SIGNIFICANT CHANGE UP (ref 3.8–10.5)
WBC # FLD AUTO: 9.89 K/UL — SIGNIFICANT CHANGE UP (ref 3.8–10.5)
WBC UR QL: ABNORMAL /HPF (ref 0–5)

## 2023-08-07 PROCEDURE — 71045 X-RAY EXAM CHEST 1 VIEW: CPT | Mod: 26

## 2023-08-07 PROCEDURE — 99233 SBSQ HOSP IP/OBS HIGH 50: CPT

## 2023-08-07 RX ORDER — APIXABAN 2.5 MG/1
5 TABLET, FILM COATED ORAL EVERY 12 HOURS
Refills: 0 | Status: DISCONTINUED | OUTPATIENT
Start: 2023-08-07 | End: 2023-08-10

## 2023-08-07 RX ADMIN — OXYCODONE HYDROCHLORIDE 5 MILLIGRAM(S): 5 TABLET ORAL at 18:21

## 2023-08-07 RX ADMIN — Medication 25 MILLIGRAM(S): at 10:29

## 2023-08-07 RX ADMIN — SENNA PLUS 2 TABLET(S): 8.6 TABLET ORAL at 20:58

## 2023-08-07 RX ADMIN — Medication 975 MILLIGRAM(S): at 13:46

## 2023-08-07 RX ADMIN — Medication 240 MILLIGRAM(S): at 10:29

## 2023-08-07 RX ADMIN — Medication 975 MILLIGRAM(S): at 20:59

## 2023-08-07 RX ADMIN — Medication 975 MILLIGRAM(S): at 21:23

## 2023-08-07 RX ADMIN — Medication 975 MILLIGRAM(S): at 05:02

## 2023-08-07 RX ADMIN — Medication 1: at 18:22

## 2023-08-07 RX ADMIN — Medication 300 MILLIGRAM(S): at 10:30

## 2023-08-07 RX ADMIN — Medication 1: at 13:52

## 2023-08-07 RX ADMIN — ALBUTEROL 2.5 MILLIGRAM(S): 90 AEROSOL, METERED ORAL at 15:11

## 2023-08-07 RX ADMIN — SIMVASTATIN 20 MILLIGRAM(S): 20 TABLET, FILM COATED ORAL at 20:58

## 2023-08-07 RX ADMIN — POLYETHYLENE GLYCOL 3350 17 GRAM(S): 17 POWDER, FOR SOLUTION ORAL at 10:30

## 2023-08-07 RX ADMIN — ALBUTEROL 2.5 MILLIGRAM(S): 90 AEROSOL, METERED ORAL at 01:35

## 2023-08-07 RX ADMIN — OXYCODONE HYDROCHLORIDE 5 MILLIGRAM(S): 5 TABLET ORAL at 11:30

## 2023-08-07 RX ADMIN — BUDESONIDE AND FORMOTEROL FUMARATE DIHYDRATE 2 PUFF(S): 160; 4.5 AEROSOL RESPIRATORY (INHALATION) at 15:11

## 2023-08-07 RX ADMIN — BUDESONIDE AND FORMOTEROL FUMARATE DIHYDRATE 2 PUFF(S): 160; 4.5 AEROSOL RESPIRATORY (INHALATION) at 20:01

## 2023-08-07 RX ADMIN — ALBUTEROL 2.5 MILLIGRAM(S): 90 AEROSOL, METERED ORAL at 09:37

## 2023-08-07 RX ADMIN — OXYCODONE HYDROCHLORIDE 5 MILLIGRAM(S): 5 TABLET ORAL at 10:30

## 2023-08-07 RX ADMIN — Medication 1: at 20:57

## 2023-08-07 RX ADMIN — HYDROMORPHONE HYDROCHLORIDE 1 MILLIGRAM(S): 2 INJECTION INTRAMUSCULAR; INTRAVENOUS; SUBCUTANEOUS at 05:16

## 2023-08-07 RX ADMIN — TIOTROPIUM BROMIDE 2 PUFF(S): 18 CAPSULE ORAL; RESPIRATORY (INHALATION) at 09:38

## 2023-08-07 RX ADMIN — HYDROMORPHONE HYDROCHLORIDE 1 MILLIGRAM(S): 2 INJECTION INTRAMUSCULAR; INTRAVENOUS; SUBCUTANEOUS at 05:01

## 2023-08-07 RX ADMIN — APIXABAN 5 MILLIGRAM(S): 2.5 TABLET, FILM COATED ORAL at 13:47

## 2023-08-07 RX ADMIN — ALBUTEROL 2.5 MILLIGRAM(S): 90 AEROSOL, METERED ORAL at 20:02

## 2023-08-07 RX ADMIN — Medication 975 MILLIGRAM(S): at 14:45

## 2023-08-07 RX ADMIN — APIXABAN 5 MILLIGRAM(S): 2.5 TABLET, FILM COATED ORAL at 22:10

## 2023-08-07 NOTE — PROVIDER CONTACT NOTE (CRITICAL VALUE NOTIFICATION) - BACKGROUND
S/P L hip ORIF [PE + No Restrictions] : [unfilled] may participate in the entire physical education program without restriction, including all varsity competitive sports. [Needs SBE Prophylaxis] : [unfilled] does not need bacterial endocarditis prophylaxis

## 2023-08-07 NOTE — OCCUPATIONAL THERAPY INITIAL EVALUATION ADULT - LEVEL OF INDEPENDENCE: TUB, REHAB EVAL
educated on safety considerations with Patient will recall and adhere to 3/3 posterior hip precautions with 100% accuracy within 3-5 tx sessions educated on safety considerations with posterior hip precautions

## 2023-08-07 NOTE — PROGRESS NOTE ADULT - SUBJECTIVE AND OBJECTIVE BOX
CHIEF COMPLAINT/INTERVAL HISTORY:    Patient is a 82y old  Female who presents with a chief complaint of s/p fall with left hip fracture (05 Aug 2023 11:25)      HPI:  Pt is a 83 y/o female w/PMHx of HTN, HLD, DM, Paroxysmal Afib, ANU, COPD on 6L O2 at home, presents after fall c/o hip pain. Pt states she was walking in her house, tripped and fell onto L Hip. Pt noticed immediate pain and inability to ambulate s/p MF. No other orthopedic injuries or complaints. No HS/LOC. No hx of orthopedic surgeries in the past. Recently seen in ED on 6/22/23 for L Proximal humerus fx s/p MF, treated non-operatively. Hx of severe COPD on 6LNC at Home and AFib on Eliquis (Last Dose 6pm 8/4/23). (05 Aug 2023 07:44)      POD#2 tm 102    Vital Signs Last 24 Hrs  T(C): 36.9 (07 Aug 2023 05:00), Max: 36.9 (06 Aug 2023 17:19)  T(F): 98.5 (07 Aug 2023 05:00), Max: 98.5 (06 Aug 2023 17:19)  HR: 78 (07 Aug 2023 08:00) (78 - 114)  BP: 112/42 (07 Aug 2023 08:00) (111/49 - 147/66)  BP(mean): 61 (07 Aug 2023 08:00) (58 - 101)  RR: 18 (07 Aug 2023 08:00) (14 - 28)  SpO2: 90% (07 Aug 2023 06:00) (87% - 95%)    Parameters below as of 07 Aug 2023 06:00  Patient On (Oxygen Delivery Method): nasal cannula  O2 Flow (L/min): 6    MEDICATIONS  (STANDING):  acetaminophen     Tablet .. 975 milliGRAM(s) Oral every 8 hours  albuterol    0.083% 2.5 milliGRAM(s) Nebulizer every 6 hours  albuterol    90 MICROgram(s) HFA Inhaler 1 Puff(s) Inhalation every 4 hours  allopurinol 300 milliGRAM(s) Oral daily  budesonide 160 MICROgram(s)/formoterol 4.5 MICROgram(s) Inhaler 2 Puff(s) Inhalation two times a day  diltiazem    milliGRAM(s) Oral daily  enoxaparin Injectable 40 milliGRAM(s) SubCutaneous every 24 hours  glucagon  Injectable 1 milliGRAM(s) IntraMuscular once  insulin lispro (ADMELOG) corrective regimen sliding scale   SubCutaneous three times a day before meals  metoprolol succinate ER 25 milliGRAM(s) Oral daily  polyethylene glycol 3350 17 Gram(s) Oral daily  senna 2 Tablet(s) Oral at bedtime  simvastatin 20 milliGRAM(s) Oral at bedtime  tiotropium 2.5 MICROgram(s) Inhaler 2 Puff(s) Inhalation daily  tranexamic acid IVPB 1000 milliGRAM(s) IV Intermittent once  tranexamic acid IVPB 1000 milliGRAM(s) IV Intermittent once    Home Medications:  allopurinol 300 mg oral tablet: 1 tab(s) orally once a day (05 Aug 2023 01:19)  Apriso 0.375 g oral capsule, extended release: 4 cap(s) orally once a day (05 Aug 2023 01:19)  Cartia  mg/24 hours oral capsule, extended release: 1 cap(s) orally once a day (05 Aug 2023 01:19)  CoQ10: orally once a day (05 Aug 2023 01:19)  dicyclomine 10 mg oral capsule: 1 cap(s) orally 3 times a day, As Needed (05 Aug 2023 01:19)  Eliquis 5 mg oral tablet: 1 tab(s) orally 2 times a day (05 Aug 2023 01:19)  metFORMIN 500 mg oral tablet: 2 tab(s) orally once a day (in the morning) (05 Aug 2023 01:19)  metFORMIN 500 mg oral tablet: 1 tab(s) orally once (at bedtime) (05 Aug 2023 01:19)  Metoprolol Succinate ER 25 mg oral tablet, extended release: 1 tab(s) orally once a day (05 Aug 2023 01:19)  Pepcid Complete: *** as needed*** (05 Aug 2023 04:34)  Probiotic Formula oral capsule: 1 cap(s) orally 2 times a day (05 Aug 2023 01:19)  Prolia 60 mg/mL subcutaneous solution: subcutaneous every 6 months (05 Aug 2023 01:19)  simvastatin 20 mg oral tablet: 1 tab(s) orally once a day (at bedtime) (05 Aug 2023 01:19)  Trelegy Ellipta 100 mcg-62.5 mcg-25 mcg/inh inhalation powder: 1 puff(s) inhaled once a day (05 Aug 2023 01:19)  Vitamin D3 25 mcg (1000 intl units) oral tablet: 1 tab(s) orally once a day (05 Aug 2023 01:19)  zolpidem 5 mg oral tablet: 1 tab(s) orally once a day (at bedtime), As Needed (05 Aug 2023 01:19)          PHYSICAL EXAM:    GENERAL: NAD, well-groomed, well-developed  NERVOUS SYSTEM:  Alert & Oriented X3, Motor Strength 5/5 B/L upper and lower extremities; DTRs 2+ intact and symmetric  CHEST/LUNG: Clear to auscultation bilaterally; No rales, rhonchi, wheezing, or rubs  HEART: Regular rate and rhythm; No murmurs, rubs, or gallops  ABDOMEN: Soft, Nontender, Nondistended; Bowel sounds present  EXTREMITIES:  2+ Peripheral Pulses, No clubbing, cyanosis, or edema    LABS:                                     7.2    9.89  )-----------( 229      ( 07 Aug 2023 05:16 )             23.4   08-07    136  |  104  |  12  ----------------------------<  141<H>  4.6   |  29  |  0.87    Ca    9.0      07 Aug 2023 05:16        CAPILLARY BLOOD GLUCOSE      POCT Blood Glucose.: 184 mg/dL (06 Aug 2023 18:34)  POCT Blood Glucose.: 179 mg/dL (06 Aug 2023 13:40)        Left hip fx  POD#2  Tm 102    #T2DM  - ISS  - Monitor glucose, A1C    #HTN, HLD, DM, Paroxysmal Afib, COPD  - Continue with home meds- resume Eliquis

## 2023-08-07 NOTE — PROGRESS NOTE ADULT - SUBJECTIVE AND OBJECTIVE BOX
Subjective:    pat better, last night febrile episode, now afebrile 98.6.    Home Medications:  allopurinol 300 mg oral tablet: 1 tab(s) orally once a day (05 Aug 2023 01:19)  Apriso 0.375 g oral capsule, extended release: 4 cap(s) orally once a day (05 Aug 2023 01:19)  Cartia  mg/24 hours oral capsule, extended release: 1 cap(s) orally once a day (05 Aug 2023 01:19)  CoQ10: orally once a day (05 Aug 2023 01:19)  dicyclomine 10 mg oral capsule: 1 cap(s) orally 3 times a day, As Needed (05 Aug 2023 01:19)  Eliquis 5 mg oral tablet: 1 tab(s) orally 2 times a day (05 Aug 2023 01:19)  metFORMIN 500 mg oral tablet: 2 tab(s) orally once a day (in the morning) (05 Aug 2023 01:19)  metFORMIN 500 mg oral tablet: 1 tab(s) orally once (at bedtime) (05 Aug 2023 01:19)  Metoprolol Succinate ER 25 mg oral tablet, extended release: 1 tab(s) orally once a day (05 Aug 2023 01:19)  Pepcid Complete: *** as needed*** (05 Aug 2023 04:34)  Probiotic Formula oral capsule: 1 cap(s) orally 2 times a day (05 Aug 2023 01:19)  Prolia 60 mg/mL subcutaneous solution: subcutaneous every 6 months (05 Aug 2023 01:19)  simvastatin 20 mg oral tablet: 1 tab(s) orally once a day (at bedtime) (05 Aug 2023 01:19)  Trelegy Ellipta 100 mcg-62.5 mcg-25 mcg/inh inhalation powder: 1 puff(s) inhaled once a day (05 Aug 2023 01:19)  Vitamin D3 25 mcg (1000 intl units) oral tablet: 1 tab(s) orally once a day (05 Aug 2023 01:19)  zolpidem 5 mg oral tablet: 1 tab(s) orally once a day (at bedtime), As Needed (05 Aug 2023 01:19)    MEDICATIONS  (STANDING):  acetaminophen     Tablet .. 975 milliGRAM(s) Oral every 8 hours  albuterol    0.083% 2.5 milliGRAM(s) Nebulizer every 6 hours  albuterol    90 MICROgram(s) HFA Inhaler 1 Puff(s) Inhalation every 4 hours  allopurinol 300 milliGRAM(s) Oral daily  apixaban 5 milliGRAM(s) Oral every 12 hours  budesonide 160 MICROgram(s)/formoterol 4.5 MICROgram(s) Inhaler 2 Puff(s) Inhalation two times a day  dextrose 5%. 1000 milliLiter(s) (100 mL/Hr) IV Continuous <Continuous>  dextrose 5%. 1000 milliLiter(s) (50 mL/Hr) IV Continuous <Continuous>  dextrose 50% Injectable 25 Gram(s) IV Push once  dextrose 50% Injectable 25 Gram(s) IV Push once  dextrose 50% Injectable 12.5 Gram(s) IV Push once  diltiazem    milliGRAM(s) Oral daily  glucagon  Injectable 1 milliGRAM(s) IntraMuscular once  insulin lispro (ADMELOG) corrective regimen sliding scale   SubCutaneous three times a day before meals  metoprolol succinate ER 25 milliGRAM(s) Oral daily  polyethylene glycol 3350 17 Gram(s) Oral daily  senna 2 Tablet(s) Oral at bedtime  simvastatin 20 milliGRAM(s) Oral at bedtime  tiotropium 2.5 MICROgram(s) Inhaler 2 Puff(s) Inhalation daily  tranexamic acid IVPB 1000 milliGRAM(s) IV Intermittent once  tranexamic acid IVPB 1000 milliGRAM(s) IV Intermittent once    MEDICATIONS  (PRN):  aluminum hydroxide/magnesium hydroxide/simethicone Suspension 30 milliLiter(s) Oral every 4 hours PRN Dyspepsia  dextrose Oral Gel 15 Gram(s) Oral once PRN Blood Glucose LESS THAN 70 milliGRAM(s)/deciliter  dicyclomine 10 milliGRAM(s) Oral three times a day before meals PRN spasms  HYDROmorphone  Injectable 1 milliGRAM(s) IV Push every 4 hours PRN Severe Pain (7 - 10)  magnesium hydroxide Suspension 30 milliLiter(s) Oral daily PRN Constipation  melatonin 3 milliGRAM(s) Oral at bedtime PRN Insomnia  ondansetron Injectable 4 milliGRAM(s) IV Push every 6 hours PRN Nausea and/or Vomiting  oxyCODONE    IR 5 milliGRAM(s) Oral every 4 hours PRN Moderate Pain (4 - 6)  zolpidem 5 milliGRAM(s) Oral at bedtime PRN Insomnia      Allergies    aspirin (Stomach Upset)    Intolerances        Vital Signs Last 24 Hrs  T(C): 36.7 (07 Aug 2023 10:23), Max: 36.9 (06 Aug 2023 17:19)  T(F): 98 (07 Aug 2023 10:23), Max: 98.5 (06 Aug 2023 17:19)  HR: 79 (07 Aug 2023 09:39) (78 - 114)  BP: 112/42 (07 Aug 2023 08:00) (111/49 - 147/66)  BP(mean): 61 (07 Aug 2023 08:00) (58 - 101)  RR: 18 (07 Aug 2023 08:00) (14 - 28)  SpO2: 96% (07 Aug 2023 09:39) (87% - 96%)    Parameters below as of 07 Aug 2023 09:39  Patient On (Oxygen Delivery Method): nasal cannula          PHYSICAL EXAMINATION:    NECK:  Supple. No lymphadenopathy. Jugular venous pressure not elevated. Carotids equal.   HEART:   The cardiac impulse has a normal quality. Reg., Nl S1 and S2.  There are no murmurs, rubs or gallops noted  CHEST:  Chest is clear to auscultation. Normal respiratory effort.  ABDOMEN:  Soft and nontender.   EXTREMITIES:  There is no edema.       LABS:                        7.7    x     )-----------( x        ( 07 Aug 2023 10:32 )             25.3     08-07    136  |  104  |  12  ----------------------------<  141<H>  4.6   |  29  |  0.87    Ca    9.0      07 Aug 2023 05:16        Urinalysis Basic - ( 07 Aug 2023 05:16 )    Color: x / Appearance: x / SG: x / pH: x  Gluc: 141 mg/dL / Ketone: x  / Bili: x / Urobili: x   Blood: x / Protein: x / Nitrite: x   Leuk Esterase: x / RBC: x / WBC x   Sq Epi: x / Non Sq Epi: x / Bacteria: x

## 2023-08-07 NOTE — OCCUPATIONAL THERAPY INITIAL EVALUATION ADULT - PERTINENT HX OF CURRENT PROBLEM, REHAB EVAL
Per H&P- pt is a 81 y/o female w/ PMH of HTN, HLD, DM, Paroxysmal Afib, ANU, COPD on 6L O2 at home, presents after fall c/o hip pain. Pt states she was walking in her house, tripped and fell onto L Hip. Pt noticed immediate pain and inability to ambulate s/p MF. No other orthopedic injuries or complaints. No HS/LOC. Recently seen in ED on 6/22/23 for L Proximal humerus fx s/p MF, treated non-operatively. Hx of severe COPD on 6LNC at Home and AFib on Eliquis (Last Dose 6pm 8/4/23). Pt is now s/p L hip melisa. Pt with low H&H this AM 8/7. Per H&P- pt is a 81 y/o female w/ PMH of HTN, HLD, DM, Paroxysmal Afib, ANU, COPD on 6L O2 at home, presents after fall c/o hip pain. Pt states she was walking in her house, tripped and fell onto L Hip. Pt noticed immediate pain and inability to ambulate s/p MF. No other orthopedic injuries or complaints. No HS/LOC. Recently seen in ED on 6/22/23 for L Proximal humerus fx s/p MF, treated non-operatively (was NWB then, new orders did not specify, conferred with RN who is confirming), Hx of severe COPD on 6LNC at Home and AFib on Eliquis (Last Dose 6pm 8/4/23). Pt is now s/p L hip melisa. Pt with low H&H this AM 8/7.

## 2023-08-07 NOTE — PROGRESS NOTE ADULT - ASSESSMENT
PROBLEMS:    S/P Left hip fx  Paroxysmal Afib-on eliquis  Severe COPD-ON 6liter NC home oxygen  T2DM  HTN/HLD/DM  H/O HTN, HLD, DM, Paroxysmal Afib, COPD    PLAN:    pulmonary stable  PT/oob  S/P ORIF, fracture, femur, neck, left-melisa hip arthroplasty   TITRATE FIO2  ANTI-COAGULANTS AS PER ORTHO  Nebs/AEROSOLS  Prn analgesia  [id number]  DVT ppx

## 2023-08-07 NOTE — PROGRESS NOTE ADULT - SUBJECTIVE AND OBJECTIVE BOX
Pt seen at bedside this AM. No acute complaints. Pain is well managed. Denies numbness, tingling, fevers, chills, CP, or SOB.    Vital Signs Last 24 Hrs  T(C): 36.9 (07 Aug 2023 05:00), Max: 36.9 (06 Aug 2023 17:19)  T(F): 98.5 (07 Aug 2023 05:00), Max: 98.5 (06 Aug 2023 17:19)  HR: 79 (07 Aug 2023 09:39) (78 - 114)  BP: 112/42 (07 Aug 2023 08:00) (111/49 - 147/66)  BP(mean): 61 (07 Aug 2023 08:00) (58 - 101)  RR: 18 (07 Aug 2023 08:00) (14 - 28)  SpO2: 96% (07 Aug 2023 09:39) (87% - 96%)    Parameters below as of 07 Aug 2023 09:39  Patient On (Oxygen Delivery Method): nasal cannula                          7.2    9.89  )-----------( 229      ( 07 Aug 2023 05:16 )             23.4       08-07    136  |  104  |  12  ----------------------------<  141<H>  4.6   |  29  |  0.87    Ca    9.0      07 Aug 2023 05:16          Physical Exam:  General: NAD, pt laying in bed comfortably with nebulizer  Compartments soft, compressible  Calves nontender  SCDs in place  Abduction pillow in place    LLE:  Dressings C/D/I  Motor: +GSC, TA, EHL, FHL  SILT: SPN, DPN, Tib, Saph, Mandi  +DP    A/P  Pt is a 82F POD2 L hip melisa    WBAT, posterior hip precautions  Abduction pillow in place when laying in bed or sitting  PT/OT  Analgesics  DVT ppx per primary team  Followup AM labs  Rest, compress, ice, elevate extremity as needed  Incentive spirometry  Appreciate medical recs  Discussed plan with Dr. Cárdenas who agrees with above Pt seen at bedside this AM. No acute complaints. Pain is well managed. Denies numbness, tingling, fevers, chills, CP, or SOB.    Vital Signs Last 24 Hrs  T(C): 36.9 (07 Aug 2023 05:00), Max: 36.9 (06 Aug 2023 17:19)  T(F): 98.5 (07 Aug 2023 05:00), Max: 98.5 (06 Aug 2023 17:19)  HR: 79 (07 Aug 2023 09:39) (78 - 114)  BP: 112/42 (07 Aug 2023 08:00) (111/49 - 147/66)  BP(mean): 61 (07 Aug 2023 08:00) (58 - 101)  RR: 18 (07 Aug 2023 08:00) (14 - 28)  SpO2: 96% (07 Aug 2023 09:39) (87% - 96%)    Parameters below as of 07 Aug 2023 09:39  Patient On (Oxygen Delivery Method): nasal cannula                          7.2    9.89  )-----------( 229      ( 07 Aug 2023 05:16 )             23.4       08-07    136  |  104  |  12  ----------------------------<  141<H>  4.6   |  29  |  0.87    Ca    9.0      07 Aug 2023 05:16          Physical Exam:  General: NAD, pt laying in bed comfortably with nebulizer  Compartments soft, compressible  Calves nontender  SCDs in place  Abduction pillow in place    LLE:  Dressings C/D/I  Motor: +GSC, TA, EHL, FHL  SILT: SPN, DPN, Tib, Saph, Mandi  +DP    A/P  Pt is a 82F POD2 L hip melisa    H/H: 7.2/23.4 would recommend 1U pRBC  WBAT, posterior hip precautions  Abduction pillow in place when laying in bed or sitting  PT/OT  Analgesics  DVT ppx per primary team  Rest, compress, ice, elevate extremity as needed  Incentive spirometry  Appreciate medical recs  Discussed plan with Dr. Cárdenas who agrees with above

## 2023-08-07 NOTE — OCCUPATIONAL THERAPY INITIAL EVALUATION ADULT - MD ORDER
"OT Evaluate and Treat"- MD orders received. Chart reviewed, contents noted, conferred with RN "OT Evaluate and Treat"- MD orders received. Chart reviewed, contents noted, conferred with SONAM Robbins. "OT Evaluate and Treat"- MD orders received. Chart reviewed, contents noted, conferred with RN Itzel, notified RN of active bedrest orders, gave verbal okay for transfer assessment, pt rec'd OOB in chair during first encounter on IE.

## 2023-08-07 NOTE — OCCUPATIONAL THERAPY INITIAL EVALUATION ADULT - MODALITIES TREATMENT COMMENTS
Pt left semi-supine in bed, +abduction wedge, daughter at bedside, +b/l SCD's, SICU lines/monitors intact, CB/TT/phone IR, needs met. Pt left semi-supine in bed, +abduction wedge, daughter at bedside, +b/l SCD's, SICU lines/monitors intact, +6L SpO2, CB/TT/phone IR, needs met.

## 2023-08-07 NOTE — OCCUPATIONAL THERAPY INITIAL EVALUATION ADULT - ADDITIONAL COMMENTS
Pt resides alone in a  with 2 ADAN through garage and 1 step up into the kitchen. Pt reports being (I) with all ADL, IADL tasks PTA (has a cleaning lady), walked without AD, has a tub/shower combo (has TTB 2* recent shoulder injury) and a chair height toilet. + Pt resides alone in a  with 2 ADAN through garage and 1 step up into the kitchen. Pt reports being (I) with all ADL, IADL tasks PTA (has a cleaning lady), walked without AD, has a tub/shower combo (has TTB 2* recent shoulder injury) and a chair height toilet, +

## 2023-08-07 NOTE — PROGRESS NOTE ADULT - SUBJECTIVE AND OBJECTIVE BOX
CHIEF COMPLAINT: Patient is a 82y old  Female who presents with a chief complaint of sob (06 Aug 2023 09:03)    FROM H&P: Pt is a 83 y/o female w/PMHx of HTN, HLD, DM, Paroxysmal Afib, ANU, COPD on 6L O2 at home, presents after fall c/o hip pain. Pt states she was walking in her house, tripped and fell onto L Hip. Pt noticed immediate pain and inability to ambulate s/p MF. No other orthopedic injuries or complaints. No HS/LOC. No hx of orthopedic surgeries in the past. Recently seen in ED on 6/22/23 for L Proximal humerus fx s/p MF, treated non-operatively. Hx of severe COPD on 6LNC at Home and AFib on Eliquis (Last Dose 6pm 8/4/23). (05 Aug 2023 07:44)    8/5. Cardiology Consultation: Seen for Dr Way. Seen for preop cardiac evaluation. She tripped and fell on 8/4/23 and has a left hip fracture.  8/6. No complaints. S/P ORIF left femoral neck fracture 8/5.  8/7. Very tired, feeling ok, monitor H&H, no CP or sob    MEDICATIONS:   acetaminophen     Tablet .. 975 milliGRAM(s) Oral every 8 hours  albuterol    0.083% 2.5 milliGRAM(s) Nebulizer every 6 hours  albuterol    90 MICROgram(s) HFA Inhaler 1 Puff(s) Inhalation every 4 hours  allopurinol 300 milliGRAM(s) Oral daily  apixaban 5 milliGRAM(s) Oral every 12 hours  budesonide 160 MICROgram(s)/formoterol 4.5 MICROgram(s) Inhaler 2 Puff(s) Inhalation two times a day  dextrose 5%. 1000 milliLiter(s) (50 mL/Hr) IV Continuous <Continuous>  dextrose 5%. 1000 milliLiter(s) (100 mL/Hr) IV Continuous <Continuous>  dextrose 50% Injectable 12.5 Gram(s) IV Push once  dextrose 50% Injectable 25 Gram(s) IV Push once  dextrose 50% Injectable 25 Gram(s) IV Push once  diltiazem    milliGRAM(s) Oral daily  glucagon  Injectable 1 milliGRAM(s) IntraMuscular once  insulin lispro (ADMELOG) corrective regimen sliding scale   SubCutaneous three times a day before meals  metoprolol succinate ER 25 milliGRAM(s) Oral daily  polyethylene glycol 3350 17 Gram(s) Oral daily  senna 2 Tablet(s) Oral at bedtime  simvastatin 20 milliGRAM(s) Oral at bedtime  tiotropium 2.5 MICROgram(s) Inhaler 2 Puff(s) Inhalation daily  tranexamic acid IVPB 1000 milliGRAM(s) IV Intermittent once  tranexamic acid IVPB 1000 milliGRAM(s) IV Intermittent once    MEDICATIONS  (PRN):  aluminum hydroxide/magnesium hydroxide/simethicone Suspension 30 milliLiter(s) Oral every 4 hours PRN Dyspepsia  dextrose Oral Gel 15 Gram(s) Oral once PRN Blood Glucose LESS THAN 70 milliGRAM(s)/deciliter  dicyclomine 10 milliGRAM(s) Oral three times a day before meals PRN spasms  HYDROmorphone  Injectable 1 milliGRAM(s) IV Push every 4 hours PRN Severe Pain (7 - 10)  magnesium hydroxide Suspension 30 milliLiter(s) Oral daily PRN Constipation  melatonin 3 milliGRAM(s) Oral at bedtime PRN Insomnia  ondansetron Injectable 4 milliGRAM(s) IV Push every 6 hours PRN Nausea and/or Vomiting  oxyCODONE    IR 5 milliGRAM(s) Oral every 4 hours PRN Moderate Pain (4 - 6)  zolpidem 5 milliGRAM(s) Oral at bedtime PRN Insomnia    HOME MEDICATIONS:   allopurinol 300 mg oral tablet: 1 tab(s) orally once a day (05 Aug 2023 01:19)  Apriso 0.375 g oral capsule, extended release: 4 cap(s) orally once a day (05 Aug 2023 01:19)  Cartia  mg/24 hours oral capsule, extended release: 1 cap(s) orally once a day (05 Aug 2023 01:19)  CoQ10: orally once a day (05 Aug 2023 01:19)  dicyclomine 10 mg oral capsule: 1 cap(s) orally 3 times a day, As Needed (05 Aug 2023 01:19)  Eliquis 5 mg oral tablet: 1 tab(s) orally 2 times a day (05 Aug 2023 01:19)  metFORMIN 500 mg oral tablet: 2 tab(s) orally once a day (in the morning) (05 Aug 2023 01:19)  metFORMIN 500 mg oral tablet: 1 tab(s) orally once (at bedtime) (05 Aug 2023 01:19)  Metoprolol Succinate ER 25 mg oral tablet, extended release: 1 tab(s) orally once a day (05 Aug 2023 01:19)  Pepcid Complete: *** as needed*** (05 Aug 2023 04:34)  Probiotic Formula oral capsule: 1 cap(s) orally 2 times a day (05 Aug 2023 01:19)  Prolia 60 mg/mL subcutaneous solution: subcutaneous every 6 months (05 Aug 2023 01:19)  simvastatin 20 mg oral tablet: 1 tab(s) orally once a day (at bedtime) (05 Aug 2023 01:19)  Trelegy Ellipta 100 mcg-62.5 mcg-25 mcg/inh inhalation powder: 1 puff(s) inhaled once a day (05 Aug 2023 01:19)  Vitamin D3 25 mcg (1000 intl units) oral tablet: 1 tab(s) orally once a day (05 Aug 2023 01:19)  zolpidem 5 mg oral tablet: 1 tab(s) orally once a day (at bedtime), As Needed (05 Aug 2023 01:19)    PHYSICAL EXAM:  T(C): 36.9 (07 Aug 2023 05:00), Max: 36.9 (06 Aug 2023 17:19)  T(F): 98.5 (07 Aug 2023 05:00), Max: 98.5 (06 Aug 2023 17:19)  HR: 79 (07 Aug 2023 09:39) (78 - 114)  BP: 112/42 (07 Aug 2023 08:00) (111/49 - 147/66)  BP(mean): 61 (07 Aug 2023 08:00) (58 - 101)  RR: 18 (07 Aug 2023 08:00) (14 - 28)  SpO2: 96% (07 Aug 2023 09:39) (87% - 96%)    Parameters below as of 07 Aug 2023 09:39  Patient On (Oxygen Delivery Method): nasal cannula    Constitutional: NAD, awake and alert  HEENT: PERR, EOMI, Normal Hearing, MMM  Neck: Soft and supple, No LAD, No JVD  Respiratory: Breath sounds are clear bilaterally, No wheezing, rales or rhonchi  Cardiovascular: S1 and S2, regular rate and rhythm, no Murmurs, gallops or rubs  Gastrointestinal: Bowel Sounds present, soft, nontender, nondistended, no guarding, no rebound  Extremities: No peripheral edema  Vascular: 2+ peripheral pulses  Neurological: A/O x 3, no focal deficits  Musculoskeletal: 5/5 strength b/l upper and lower extremities  Skin: No rashes    =======================================    INTERPRETATION OF TELEMETRY:     ECG:    ========================================    LABS:                        7.7    x     )-----------( x        ( 07 Aug 2023 10:32 )             25.3     08-07    136  |  104  |  12  ----------------------------<  141<H>  4.6   |  29  |  0.87    Ca    9.0      07 Aug 2023    BNP    < from: TTE Echo Complete w/o Contrast w/ Doppler (08.05.23 @ 10:29) >   The aortic valve is well visualized, appears moderately calcified. Valve   opening seems to be restricted.   Moderate aortic stenosis is present based on RONALD 1.2 cm2.   Peak and mean transaortic gradients are 39 and 21 mmHg respectively.   The left atrium is moderately dilated.   Estimated left ventricular ejection fraction is 60-65 %.   The left ventricle is normal in size, wall motion and contractility as   seen in limited views.   Mild concentric left ventricular hypertrophy is present.   All visualized extra cardiac structures appears to be normal.   There is calcification of both mitral valve leaflets. The leaflet opening   is normal.   Mild mitral annular calcification is present.   Mild (1+) mitral regurgitation is present.   EA reversal of the mitral inflow consistent with reduced compliance of   the   left ventricle.   No evidence of pericardial effusion.   No evidence of pleural effusion.   Normal appearing pulmonic valve structure and function.   The right atrium appears mildly dilated.   The right ventricle is mildly dilated.   Normal appearing right ventricle function.   Normal appearing tricuspid valve structure.   Mild to Moderate Tricuspid regurgitation is present.   Moderate pulmonary hypertension.    < end of copied text >      RADIOLOGY & ADDITIONAL STUDIES:    < from: Xray Hip w/ Pelvis 1 View, Left (08.05.23 @ 16:51) >  Status post left hip arthroplasty.    < end of copied text >  < from: Xray Femur 2 Views, Left (08.04.23 @ 23:48) >  Subcapital fracture.    The patient subsequently underwent ORIF of the left hip.    < end of copied text >

## 2023-08-07 NOTE — OCCUPATIONAL THERAPY INITIAL EVALUATION ADULT - COGNITIVE, VISUAL PERCEPTUAL, OT EVAL
Patient will recall and adhere to 3/3 posterior hip precautions with 100% accuracy within 3-5 tx sessions to increase safety w/ ADL, IADL, and fxl mobility tasks

## 2023-08-07 NOTE — OCCUPATIONAL THERAPY INITIAL EVALUATION ADULT - RANGE OF MOTION EXAMINATION
RUE: WNL, LUE: (pt started outpatient therapy and has been moving through full ROM per pt), grossly assessed 2* ROM and restrictions not specified

## 2023-08-07 NOTE — PROGRESS NOTE ADULT - ASSESSMENT
81 y/o female w/PMHx of HTN, HLD, DM, Paroxysmal Afib, ANU, COPD on 6L O2 at home, presents after fall c/o hip pain. Pt states she was walking in her house, tripped and fell onto L Hip. Pt noticed immediate pain and inability to ambulate s/p MF. No other orthopedic injuries or complaints. No HS/LOC. No hx of orthopedic surgeries in the past. Recently seen in ED on 6/22/23 for L Proximal humerus fx s/p MF, treated non-operatively. Hx of severe COPD on 6LNC at Home and AFib on Eliquis (Last Dose 6pm 8/4/23).     #Left femoral neck fracture. Stable S/P ORIF on 8/5.  #Moderate aortic stenosis. Requires no treatment.  #PAF.  Maintaining RSR. Continue metoprolol and resume oral anticoagulation when OK with orthopedist. H&H noted to be downtrended.   #HTN. Continue metoprolol, diltiazem.  #HLD. Continue statin.  #COPD. Continue oxygen, inhalers.  #IDDMT 2. Insulin coverage      Case d/w Dr. Giraldo  Will sing off, call with questions

## 2023-08-07 NOTE — OCCUPATIONAL THERAPY INITIAL EVALUATION ADULT - PRECAUTIONS/LIMITATIONS, REHAB EVAL
diet: regular, elevate HOB, abduction pillow, posterior hip precautions, notify is systolic >160 <90, diastolic >100, HR >100 <60, 6L SpO2/aspiration precautions/fall precautions/left hip precautions/oxygen therapy device and L/min/surgical precautions

## 2023-08-07 NOTE — OCCUPATIONAL THERAPY INITIAL EVALUATION ADULT - GENERAL OBSERVATIONS, REHAB EVAL
Pt rec'd sitting in bedside chair, chair alarmed, SICU lines/monitors intact, b/l SCD's, +catheter, daughter at bedside, agreeable to OT IE. Pt rec'd sitting in bedside chair, chair alarmed, +6L SpO2, SICU lines/monitors intact, b/l SCD's, +catheter, daughter at bedside, agreeable to OT IE.

## 2023-08-07 NOTE — OCCUPATIONAL THERAPY INITIAL EVALUATION ADULT - NSACTIVITYREC_GEN_A_OT
Pt presents with impaired balance, endurance and muscle strength that will benefit from skilled OT to improve independence in ADLs, reduce fall risk and chance for readmission. Pt educated on posterior hip precautions, impact on ADL/IADL engagement, and provided with handout further explaining precautions.

## 2023-08-07 NOTE — OCCUPATIONAL THERAPY INITIAL EVALUATION ADULT - ADL RETRAINING, OT EVAL
Pt will perform LE dressing and footwear management using hip kit with moderate assist in 3-5 tx sessions. Pt will perform toileting w/ minimal assist in 3-5 tx sessions.

## 2023-08-08 LAB
-  STAPHYLOCOCCUS EPIDERMIDIS, METHICILLIN RESISTANT: SIGNIFICANT CHANGE UP
24R-OH-CALCIDIOL SERPL-MCNC: 59.5 NG/ML — SIGNIFICANT CHANGE UP (ref 30–80)
ADD ON TEST-SPECIMEN IN LAB: SIGNIFICANT CHANGE UP
ALBUMIN SERPL ELPH-MCNC: 2.5 G/DL — LOW (ref 3.3–5)
ANION GAP SERPL CALC-SCNC: 4 MMOL/L — LOW (ref 5–17)
BASOPHILS # BLD AUTO: 0.03 K/UL — SIGNIFICANT CHANGE UP (ref 0–0.2)
BASOPHILS NFR BLD AUTO: 0.3 % — SIGNIFICANT CHANGE UP (ref 0–2)
BUN SERPL-MCNC: 12 MG/DL — SIGNIFICANT CHANGE UP (ref 7–23)
CALCIUM SERPL-MCNC: 9.1 MG/DL — SIGNIFICANT CHANGE UP (ref 8.5–10.1)
CHLORIDE SERPL-SCNC: 104 MMOL/L — SIGNIFICANT CHANGE UP (ref 96–108)
CO2 SERPL-SCNC: 28 MMOL/L — SIGNIFICANT CHANGE UP (ref 22–31)
CREAT SERPL-MCNC: 0.69 MG/DL — SIGNIFICANT CHANGE UP (ref 0.5–1.3)
CULTURE RESULTS: SIGNIFICANT CHANGE UP
EGFR: 86 ML/MIN/1.73M2 — SIGNIFICANT CHANGE UP
EOSINOPHIL # BLD AUTO: 0 K/UL — SIGNIFICANT CHANGE UP (ref 0–0.5)
EOSINOPHIL NFR BLD AUTO: 0 % — SIGNIFICANT CHANGE UP (ref 0–6)
GLUCOSE BLDC GLUCOMTR-MCNC: 162 MG/DL — HIGH (ref 70–99)
GLUCOSE BLDC GLUCOMTR-MCNC: 169 MG/DL — HIGH (ref 70–99)
GLUCOSE BLDC GLUCOMTR-MCNC: 193 MG/DL — HIGH (ref 70–99)
GLUCOSE BLDC GLUCOMTR-MCNC: 213 MG/DL — HIGH (ref 70–99)
GLUCOSE SERPL-MCNC: 142 MG/DL — HIGH (ref 70–99)
GRAM STN FLD: SIGNIFICANT CHANGE UP
HCT VFR BLD CALC: 22.7 % — LOW (ref 34.5–45)
HGB BLD-MCNC: 7.1 G/DL — LOW (ref 11.5–15.5)
IMM GRANULOCYTES NFR BLD AUTO: 0.5 % — SIGNIFICANT CHANGE UP (ref 0–0.9)
LYMPHOCYTES # BLD AUTO: 0.82 K/UL — LOW (ref 1–3.3)
LYMPHOCYTES # BLD AUTO: 9.5 % — LOW (ref 13–44)
MCHC RBC-ENTMCNC: 25 PG — LOW (ref 27–34)
MCHC RBC-ENTMCNC: 31.3 GM/DL — LOW (ref 32–36)
MCV RBC AUTO: 79.9 FL — LOW (ref 80–100)
METHOD TYPE: SIGNIFICANT CHANGE UP
MONOCYTES # BLD AUTO: 0.67 K/UL — SIGNIFICANT CHANGE UP (ref 0–0.9)
MONOCYTES NFR BLD AUTO: 7.7 % — SIGNIFICANT CHANGE UP (ref 2–14)
NEUTROPHILS # BLD AUTO: 7.11 K/UL — SIGNIFICANT CHANGE UP (ref 1.8–7.4)
NEUTROPHILS NFR BLD AUTO: 82 % — HIGH (ref 43–77)
NT-PROBNP SERPL-SCNC: 4312 PG/ML — HIGH (ref 0–450)
ORGANISM # SPEC MICROSCOPIC CNT: SIGNIFICANT CHANGE UP
ORGANISM # SPEC MICROSCOPIC CNT: SIGNIFICANT CHANGE UP
PLATELET # BLD AUTO: 206 K/UL — SIGNIFICANT CHANGE UP (ref 150–400)
POTASSIUM SERPL-MCNC: 4.3 MMOL/L — SIGNIFICANT CHANGE UP (ref 3.5–5.3)
POTASSIUM SERPL-SCNC: 4.3 MMOL/L — SIGNIFICANT CHANGE UP (ref 3.5–5.3)
RBC # BLD: 2.84 M/UL — LOW (ref 3.8–5.2)
RBC # FLD: 23.4 % — HIGH (ref 10.3–14.5)
SODIUM SERPL-SCNC: 136 MMOL/L — SIGNIFICANT CHANGE UP (ref 135–145)
SPECIMEN SOURCE: SIGNIFICANT CHANGE UP
SPECIMEN SOURCE: SIGNIFICANT CHANGE UP
WBC # BLD: 8.67 K/UL — SIGNIFICANT CHANGE UP (ref 3.8–10.5)
WBC # FLD AUTO: 8.67 K/UL — SIGNIFICANT CHANGE UP (ref 3.8–10.5)

## 2023-08-08 PROCEDURE — 71250 CT THORAX DX C-: CPT | Mod: 26

## 2023-08-08 PROCEDURE — 99233 SBSQ HOSP IP/OBS HIGH 50: CPT

## 2023-08-08 RX ORDER — FUROSEMIDE 40 MG
40 TABLET ORAL ONCE
Refills: 0 | Status: DISCONTINUED | OUTPATIENT
Start: 2023-08-08 | End: 2023-08-08

## 2023-08-08 RX ORDER — FUROSEMIDE 40 MG
20 TABLET ORAL ONCE
Refills: 0 | Status: COMPLETED | OUTPATIENT
Start: 2023-08-08 | End: 2023-08-08

## 2023-08-08 RX ORDER — FUROSEMIDE 40 MG
40 TABLET ORAL DAILY
Refills: 0 | Status: DISCONTINUED | OUTPATIENT
Start: 2023-08-09 | End: 2023-08-10

## 2023-08-08 RX ADMIN — Medication 975 MILLIGRAM(S): at 21:16

## 2023-08-08 RX ADMIN — OXYCODONE HYDROCHLORIDE 5 MILLIGRAM(S): 5 TABLET ORAL at 11:54

## 2023-08-08 RX ADMIN — Medication 1: at 17:48

## 2023-08-08 RX ADMIN — Medication 300 MILLIGRAM(S): at 10:01

## 2023-08-08 RX ADMIN — ALBUTEROL 2.5 MILLIGRAM(S): 90 AEROSOL, METERED ORAL at 08:04

## 2023-08-08 RX ADMIN — BUDESONIDE AND FORMOTEROL FUMARATE DIHYDRATE 2 PUFF(S): 160; 4.5 AEROSOL RESPIRATORY (INHALATION) at 08:08

## 2023-08-08 RX ADMIN — APIXABAN 5 MILLIGRAM(S): 2.5 TABLET, FILM COATED ORAL at 10:01

## 2023-08-08 RX ADMIN — Medication 975 MILLIGRAM(S): at 21:40

## 2023-08-08 RX ADMIN — ALBUTEROL 2.5 MILLIGRAM(S): 90 AEROSOL, METERED ORAL at 13:44

## 2023-08-08 RX ADMIN — BUDESONIDE AND FORMOTEROL FUMARATE DIHYDRATE 2 PUFF(S): 160; 4.5 AEROSOL RESPIRATORY (INHALATION) at 20:49

## 2023-08-08 RX ADMIN — Medication 975 MILLIGRAM(S): at 15:13

## 2023-08-08 RX ADMIN — Medication 2: at 21:47

## 2023-08-08 RX ADMIN — Medication 975 MILLIGRAM(S): at 05:14

## 2023-08-08 RX ADMIN — ZOLPIDEM TARTRATE 5 MILLIGRAM(S): 10 TABLET ORAL at 21:46

## 2023-08-08 RX ADMIN — TIOTROPIUM BROMIDE 2 PUFF(S): 18 CAPSULE ORAL; RESPIRATORY (INHALATION) at 08:08

## 2023-08-08 RX ADMIN — Medication 240 MILLIGRAM(S): at 10:00

## 2023-08-08 RX ADMIN — Medication 1: at 12:43

## 2023-08-08 RX ADMIN — ALBUTEROL 2.5 MILLIGRAM(S): 90 AEROSOL, METERED ORAL at 20:49

## 2023-08-08 RX ADMIN — POLYETHYLENE GLYCOL 3350 17 GRAM(S): 17 POWDER, FOR SOLUTION ORAL at 10:02

## 2023-08-08 RX ADMIN — Medication 25 MILLIGRAM(S): at 10:00

## 2023-08-08 RX ADMIN — SENNA PLUS 2 TABLET(S): 8.6 TABLET ORAL at 21:16

## 2023-08-08 RX ADMIN — OXYCODONE HYDROCHLORIDE 5 MILLIGRAM(S): 5 TABLET ORAL at 12:46

## 2023-08-08 RX ADMIN — APIXABAN 5 MILLIGRAM(S): 2.5 TABLET, FILM COATED ORAL at 21:16

## 2023-08-08 RX ADMIN — Medication 20 MILLIGRAM(S): at 19:12

## 2023-08-08 RX ADMIN — Medication 1: at 09:22

## 2023-08-08 RX ADMIN — SIMVASTATIN 20 MILLIGRAM(S): 20 TABLET, FILM COATED ORAL at 21:16

## 2023-08-08 NOTE — PROGRESS NOTE ADULT - SUBJECTIVE AND OBJECTIVE BOX
CHIEF COMPLAINT: Patient is a 82y old  Female who presents with a chief complaint of sob (06 Aug 2023 09:03)    FROM H&P: Pt is a 81 y/o female w/PMHx of HTN, HLD, DM, Paroxysmal Afib, ANU, COPD on 6L O2 at home, presents after fall c/o hip pain. Pt states she was walking in her house, tripped and fell onto L Hip. Pt noticed immediate pain and inability to ambulate s/p MF. No other orthopedic injuries or complaints. No HS/LOC. No hx of orthopedic surgeries in the past. Recently seen in ED on 6/22/23 for L Proximal humerus fx s/p MF, treated non-operatively. Hx of severe COPD on 6LNC at Home and AFib on Eliquis (Last Dose 6pm 8/4/23). (05 Aug 2023 07:44)    8/5. Cardiology Consultation: Seen for Dr Way. Seen for preop cardiac evaluation. She tripped and fell on 8/4/23 and has a left hip fracture.  8/6. No complaints. S/P ORIF left femoral neck fracture 8/5.  8/7. Very tired, feeling ok, monitor H&H, no CP or sob  8/8.     MEDICATIONS:   acetaminophen     Tablet .. 975 milliGRAM(s) Oral every 8 hours  albuterol    0.083% 2.5 milliGRAM(s) Nebulizer every 6 hours  albuterol    90 MICROgram(s) HFA Inhaler 1 Puff(s) Inhalation every 4 hours  allopurinol 300 milliGRAM(s) Oral daily  apixaban 5 milliGRAM(s) Oral every 12 hours  budesonide 160 MICROgram(s)/formoterol 4.5 MICROgram(s) Inhaler 2 Puff(s) Inhalation two times a day  dextrose 5%. 1000 milliLiter(s) (100 mL/Hr) IV Continuous <Continuous>  dextrose 5%. 1000 milliLiter(s) (50 mL/Hr) IV Continuous <Continuous>  dextrose 50% Injectable 12.5 Gram(s) IV Push once  dextrose 50% Injectable 25 Gram(s) IV Push once  dextrose 50% Injectable 25 Gram(s) IV Push once  diltiazem    milliGRAM(s) Oral daily  glucagon  Injectable 1 milliGRAM(s) IntraMuscular once  insulin lispro (ADMELOG) corrective regimen sliding scale   SubCutaneous Before meals and at bedtime  metoprolol succinate ER 25 milliGRAM(s) Oral daily  polyethylene glycol 3350 17 Gram(s) Oral daily  senna 2 Tablet(s) Oral at bedtime  simvastatin 20 milliGRAM(s) Oral at bedtime  tiotropium 2.5 MICROgram(s) Inhaler 2 Puff(s) Inhalation daily  tranexamic acid IVPB 1000 milliGRAM(s) IV Intermittent once  tranexamic acid IVPB 1000 milliGRAM(s) IV Intermittent once    MEDICATIONS  (PRN):  aluminum hydroxide/magnesium hydroxide/simethicone Suspension 30 milliLiter(s) Oral every 4 hours PRN Dyspepsia  dextrose Oral Gel 15 Gram(s) Oral once PRN Blood Glucose LESS THAN 70 milliGRAM(s)/deciliter  dicyclomine 10 milliGRAM(s) Oral three times a day before meals PRN spasms  HYDROmorphone  Injectable 1 milliGRAM(s) IV Push every 4 hours PRN Severe Pain (7 - 10)  magnesium hydroxide Suspension 30 milliLiter(s) Oral daily PRN Constipation  melatonin 3 milliGRAM(s) Oral at bedtime PRN Insomnia  ondansetron Injectable 4 milliGRAM(s) IV Push every 6 hours PRN Nausea and/or Vomiting  oxyCODONE    IR 5 milliGRAM(s) Oral every 4 hours PRN Moderate Pain (4 - 6)  zolpidem 5 milliGRAM(s) Oral at bedtime PRN Insomnia    HOME MEDICATIONS:   allopurinol 300 mg oral tablet: 1 tab(s) orally once a day (05 Aug 2023 01:19)  Apriso 0.375 g oral capsule, extended release: 4 cap(s) orally once a day (05 Aug 2023 01:19)  Cartia  mg/24 hours oral capsule, extended release: 1 cap(s) orally once a day (05 Aug 2023 01:19)  CoQ10: orally once a day (05 Aug 2023 01:19)  dicyclomine 10 mg oral capsule: 1 cap(s) orally 3 times a day, As Needed (05 Aug 2023 01:19)  Eliquis 5 mg oral tablet: 1 tab(s) orally 2 times a day (05 Aug 2023 01:19)  metFORMIN 500 mg oral tablet: 2 tab(s) orally once a day (in the morning) (05 Aug 2023 01:19)  metFORMIN 500 mg oral tablet: 1 tab(s) orally once (at bedtime) (05 Aug 2023 01:19)  Metoprolol Succinate ER 25 mg oral tablet, extended release: 1 tab(s) orally once a day (05 Aug 2023 01:19)  Pepcid Complete: *** as needed*** (05 Aug 2023 04:34)  Probiotic Formula oral capsule: 1 cap(s) orally 2 times a day (05 Aug 2023 01:19)  Prolia 60 mg/mL subcutaneous solution: subcutaneous every 6 months (05 Aug 2023 01:19)  simvastatin 20 mg oral tablet: 1 tab(s) orally once a day (at bedtime) (05 Aug 2023 01:19)  Trelegy Ellipta 100 mcg-62.5 mcg-25 mcg/inh inhalation powder: 1 puff(s) inhaled once a day (05 Aug 2023 01:19)  Vitamin D3 25 mcg (1000 intl units) oral tablet: 1 tab(s) orally once a day (05 Aug 2023 01:19)  zolpidem 5 mg oral tablet: 1 tab(s) orally once a day (at bedtime), As Needed (05 Aug 2023 01:19)    PHYSICAL EXAM:  T(C): 36.3 (08 Aug 2023 08:00), Max: 37.8 (07 Aug 2023 21:50)  T(F): 97.3 (08 Aug 2023 08:00), Max: 100 (07 Aug 2023 21:50)  HR: 79 (08 Aug 2023 08:00) (75 - 106)  BP: 121/42 (08 Aug 2023 08:00) (112/53 - 141/48)  BP(mean): 78 (07 Aug 2023 20:00) (66 - 78)  RR: 18 (08 Aug 2023 08:00) (15 - 28)  SpO2: 95% (08 Aug 2023 08:00) (87% - 97%)    Parameters below as of 08 Aug 2023 08:00  Patient On (Oxygen Delivery Method): mask, Venturi  O2 Flow (L/min): 10    Constitutional: NAD, awake and alert  HEENT: PERR, EOMI, Normal Hearing, MMM  Neck: Soft and supple, No LAD, No JVD  Respiratory: Breath sounds crackles b/l  Cardiovascular: S1 and S2, regular rate and rhythm, no Murmurs, gallops or rubs  Gastrointestinal: Bowel Sounds present, soft, nontender, nondistended, no guarding, no rebound  Extremities: No peripheral edema  Vascular: 2+ peripheral pulses  Neurological: A/O x 3, no focal deficits  Musculoskeletal: 5/5 strength b/l upper and lower extremities  Skin: No rashes    =======================================    LABS:                       7.1    8.67  )-----------( 206      ( 08 Aug 2023 07:23 )             22.7     08-08    136  |  104  |  12  ----------------------------<  142<H>  4.3   |  28  |  0.69    Ca    9.1      08 Aug 2023 07:23    TPro  x   /  Alb  2.5<L>  /  TBili  x   /  DBili  x   /  AST  x   /  ALT  x   /  AlkPhos  x   08-08    Troponin: 7.48 ng/L    CARDIAC TESTING:    < from: TTE Echo Complete w/o Contrast w/ Doppler (08.05.23 @ 10:29) >   The aortic valve is well visualized, appears moderately calcified. Valve   opening seems to be restricted.   Moderate aortic stenosis is present based on RONALD 1.2 cm2.   Peak and mean transaortic gradients are 39 and 21 mmHg respectively.   The left atrium is moderately dilated.   Estimated left ventricular ejection fraction is 60-65 %.   The left ventricle is normal in size, wall motion and contractility as   seen in limited views.   Mild concentric left ventricular hypertrophy is present.   All visualized extra cardiac structures appears to be normal.   There is calcification of both mitral valve leaflets. The leaflet opening   is normal.   Mild mitral annular calcification is present.   Mild (1+) mitral regurgitation is present.   EA reversal of the mitral inflow consistent with reduced compliance of   the   left ventricle.   No evidence of pericardial effusion.   No evidence of pleural effusion.   Normal appearing pulmonic valve structure and function.   The right atrium appears mildly dilated.   The right ventricle is mildly dilated.   Normal appearing right ventricle function.   Normal appearing tricuspid valve structure.   Mild to Moderate Tricuspid regurgitation is present.   Moderate pulmonary hypertension.    < end of copied text >      RADIOLOGY & ADDITIONAL STUDIES:    < from: Xray Hip w/ Pelvis 1 View, Left (08.05.23 @ 16:51) >  Status post left hip arthroplasty.    < from: Xray Femur 2 Views, Left (08.04.23 @ 23:48) >  Subcapital fracture.  The patient subsequently underwent ORIF of the left hip.    < from: CT Chest No Cont (08.04.23 @ 23:24) >  Acute left femoral neck fracture.    Mild interlobular septal thickening may reflect mild pulmonary edema.   Scattered small foci of tree-in-bud nodularity in the rightupper and   middle lobes likely reflecting infectious/inflammatory small airways   disease or impacted distal airways.    < end of copied text >   CHIEF COMPLAINT: Patient is a 82y old  Female who presents with a chief complaint of sob (06 Aug 2023 09:03)    FROM H&P: Pt is a 81 y/o female w/PMHx of HTN, HLD, DM, Paroxysmal Afib, ANU, COPD on 6L O2 at home, presents after fall c/o hip pain. Pt states she was walking in her house, tripped and fell onto L Hip. Pt noticed immediate pain and inability to ambulate s/p MF. No other orthopedic injuries or complaints. No HS/LOC. No hx of orthopedic surgeries in the past. Recently seen in ED on 6/22/23 for L Proximal humerus fx s/p MF, treated non-operatively. Hx of severe COPD on 6LNC at Home and AFib on Eliquis (Last Dose 6pm 8/4/23). (05 Aug 2023 07:44)    8/5. Cardiology Consultation: Seen for Dr Way. Seen for preop cardiac evaluation. She tripped and fell on 8/4/23 and has a left hip fracture.  8/6. No complaints. S/P ORIF left femoral neck fracture 8/5.  8/7. Very tired, feeling ok, monitor H&H, no CP or sob  8/8. 6l At baseline a t home, OOB to chair, feeling a little weak.    MEDICATIONS:   acetaminophen     Tablet .. 975 milliGRAM(s) Oral every 8 hours  albuterol    0.083% 2.5 milliGRAM(s) Nebulizer every 6 hours  albuterol    90 MICROgram(s) HFA Inhaler 1 Puff(s) Inhalation every 4 hours  allopurinol 300 milliGRAM(s) Oral daily  apixaban 5 milliGRAM(s) Oral every 12 hours  budesonide 160 MICROgram(s)/formoterol 4.5 MICROgram(s) Inhaler 2 Puff(s) Inhalation two times a day  dextrose 5%. 1000 milliLiter(s) (100 mL/Hr) IV Continuous <Continuous>  dextrose 5%. 1000 milliLiter(s) (50 mL/Hr) IV Continuous <Continuous>  dextrose 50% Injectable 12.5 Gram(s) IV Push once  dextrose 50% Injectable 25 Gram(s) IV Push once  dextrose 50% Injectable 25 Gram(s) IV Push once  diltiazem    milliGRAM(s) Oral daily  glucagon  Injectable 1 milliGRAM(s) IntraMuscular once  insulin lispro (ADMELOG) corrective regimen sliding scale   SubCutaneous Before meals and at bedtime  metoprolol succinate ER 25 milliGRAM(s) Oral daily  polyethylene glycol 3350 17 Gram(s) Oral daily  senna 2 Tablet(s) Oral at bedtime  simvastatin 20 milliGRAM(s) Oral at bedtime  tiotropium 2.5 MICROgram(s) Inhaler 2 Puff(s) Inhalation daily  tranexamic acid IVPB 1000 milliGRAM(s) IV Intermittent once  tranexamic acid IVPB 1000 milliGRAM(s) IV Intermittent once    MEDICATIONS  (PRN):  aluminum hydroxide/magnesium hydroxide/simethicone Suspension 30 milliLiter(s) Oral every 4 hours PRN Dyspepsia  dextrose Oral Gel 15 Gram(s) Oral once PRN Blood Glucose LESS THAN 70 milliGRAM(s)/deciliter  dicyclomine 10 milliGRAM(s) Oral three times a day before meals PRN spasms  HYDROmorphone  Injectable 1 milliGRAM(s) IV Push every 4 hours PRN Severe Pain (7 - 10)  magnesium hydroxide Suspension 30 milliLiter(s) Oral daily PRN Constipation  melatonin 3 milliGRAM(s) Oral at bedtime PRN Insomnia  ondansetron Injectable 4 milliGRAM(s) IV Push every 6 hours PRN Nausea and/or Vomiting  oxyCODONE    IR 5 milliGRAM(s) Oral every 4 hours PRN Moderate Pain (4 - 6)  zolpidem 5 milliGRAM(s) Oral at bedtime PRN Insomnia    HOME MEDICATIONS:   allopurinol 300 mg oral tablet: 1 tab(s) orally once a day (05 Aug 2023 01:19)  Apriso 0.375 g oral capsule, extended release: 4 cap(s) orally once a day (05 Aug 2023 01:19)  Cartia  mg/24 hours oral capsule, extended release: 1 cap(s) orally once a day (05 Aug 2023 01:19)  CoQ10: orally once a day (05 Aug 2023 01:19)  dicyclomine 10 mg oral capsule: 1 cap(s) orally 3 times a day, As Needed (05 Aug 2023 01:19)  Eliquis 5 mg oral tablet: 1 tab(s) orally 2 times a day (05 Aug 2023 01:19)  metFORMIN 500 mg oral tablet: 2 tab(s) orally once a day (in the morning) (05 Aug 2023 01:19)  metFORMIN 500 mg oral tablet: 1 tab(s) orally once (at bedtime) (05 Aug 2023 01:19)  Metoprolol Succinate ER 25 mg oral tablet, extended release: 1 tab(s) orally once a day (05 Aug 2023 01:19)  Pepcid Complete: *** as needed*** (05 Aug 2023 04:34)  Probiotic Formula oral capsule: 1 cap(s) orally 2 times a day (05 Aug 2023 01:19)  Prolia 60 mg/mL subcutaneous solution: subcutaneous every 6 months (05 Aug 2023 01:19)  simvastatin 20 mg oral tablet: 1 tab(s) orally once a day (at bedtime) (05 Aug 2023 01:19)  Trelegy Ellipta 100 mcg-62.5 mcg-25 mcg/inh inhalation powder: 1 puff(s) inhaled once a day (05 Aug 2023 01:19)  Vitamin D3 25 mcg (1000 intl units) oral tablet: 1 tab(s) orally once a day (05 Aug 2023 01:19)  zolpidem 5 mg oral tablet: 1 tab(s) orally once a day (at bedtime), As Needed (05 Aug 2023 01:19)    PHYSICAL EXAM:  T(C): 36.3 (08 Aug 2023 08:00), Max: 37.8 (07 Aug 2023 21:50)  T(F): 97.3 (08 Aug 2023 08:00), Max: 100 (07 Aug 2023 21:50)  HR: 79 (08 Aug 2023 08:00) (75 - 106)  BP: 121/42 (08 Aug 2023 08:00) (112/53 - 141/48)  BP(mean): 78 (07 Aug 2023 20:00) (66 - 78)  RR: 18 (08 Aug 2023 08:00) (15 - 28)  SpO2: 95% (08 Aug 2023 08:00) (87% - 97%)    Parameters below as of 08 Aug 2023 08:00  Patient On (Oxygen Delivery Method): mask, Venturi  O2 Flow (L/min): 10    Constitutional: NAD, awake and alert  HEENT: PERR, EOMI, Normal Hearing, MMM  Neck: Soft and supple, No LAD, No JVD  Respiratory: Breath sounds crackles b/l  Cardiovascular: S1 and S2, regular rate and rhythm, no Murmurs, gallops or rubs  Gastrointestinal: Bowel Sounds present, soft, nontender, nondistended, no guarding, no rebound  Extremities: No peripheral edema  Vascular: 2+ peripheral pulses  Neurological: A/O x 3, no focal deficits  Musculoskeletal: 5/5 strength b/l upper and lower extremities  Skin: No rashes    =======================================    LABS:                       7.1    8.67  )-----------( 206      ( 08 Aug 2023 07:23 )             22.7     08-08    136  |  104  |  12  ----------------------------<  142<H>  4.3   |  28  |  0.69    Ca    9.1      08 Aug 2023 07:23    TPro  x   /  Alb  2.5<L>  /  TBili  x   /  DBili  x   /  AST  x   /  ALT  x   /  AlkPhos  x   08-08    Troponin: 7.48 ng/L    CARDIAC TESTING:    < from: TTE Echo Complete w/o Contrast w/ Doppler (08.05.23 @ 10:29) >   The aortic valve is well visualized, appears moderately calcified. Valve   opening seems to be restricted.   Moderate aortic stenosis is present based on RONALD 1.2 cm2.   Peak and mean transaortic gradients are 39 and 21 mmHg respectively.   The left atrium is moderately dilated.   Estimated left ventricular ejection fraction is 60-65 %.   The left ventricle is normal in size, wall motion and contractility as   seen in limited views.   Mild concentric left ventricular hypertrophy is present.   All visualized extra cardiac structures appears to be normal.   There is calcification of both mitral valve leaflets. The leaflet opening   is normal.   Mild mitral annular calcification is present.   Mild (1+) mitral regurgitation is present.   EA reversal of the mitral inflow consistent with reduced compliance of   the   left ventricle.   No evidence of pericardial effusion.   No evidence of pleural effusion.   Normal appearing pulmonic valve structure and function.   The right atrium appears mildly dilated.   The right ventricle is mildly dilated.   Normal appearing right ventricle function.   Normal appearing tricuspid valve structure.   Mild to Moderate Tricuspid regurgitation is present.   Moderate pulmonary hypertension.    < end of copied text >      RADIOLOGY & ADDITIONAL STUDIES:    < from: Xray Hip w/ Pelvis 1 View, Left (08.05.23 @ 16:51) >  Status post left hip arthroplasty.    < from: Xray Femur 2 Views, Left (08.04.23 @ 23:48) >  Subcapital fracture.  The patient subsequently underwent ORIF of the left hip.    < from: CT Chest No Cont (08.04.23 @ 23:24) >  Acute left femoral neck fracture.    Mild interlobular septal thickening may reflect mild pulmonary edema.   Scattered small foci of tree-in-bud nodularity in the rightupper and   middle lobes likely reflecting infectious/inflammatory small airways   disease or impacted distal airways.    < end of copied text >

## 2023-08-08 NOTE — PROGRESS NOTE ADULT - SUBJECTIVE AND OBJECTIVE BOX
Subjective:    pat sitting in chair, desaturate off oxygen, last night required increased oxygen requirements, CXR RLL airspace disease. Low grade fever.    Home Medications:  allopurinol 300 mg oral tablet: 1 tab(s) orally once a day (05 Aug 2023 01:19)  Apriso 0.375 g oral capsule, extended release: 4 cap(s) orally once a day (05 Aug 2023 01:19)  Cartia  mg/24 hours oral capsule, extended release: 1 cap(s) orally once a day (05 Aug 2023 01:19)  CoQ10: orally once a day (05 Aug 2023 01:19)  dicyclomine 10 mg oral capsule: 1 cap(s) orally 3 times a day, As Needed (05 Aug 2023 01:19)  Eliquis 5 mg oral tablet: 1 tab(s) orally 2 times a day (05 Aug 2023 01:19)  metFORMIN 500 mg oral tablet: 2 tab(s) orally once a day (in the morning) (05 Aug 2023 01:19)  metFORMIN 500 mg oral tablet: 1 tab(s) orally once (at bedtime) (05 Aug 2023 01:19)  Metoprolol Succinate ER 25 mg oral tablet, extended release: 1 tab(s) orally once a day (05 Aug 2023 01:19)  Pepcid Complete: *** as needed*** (05 Aug 2023 04:34)  Probiotic Formula oral capsule: 1 cap(s) orally 2 times a day (05 Aug 2023 01:19)  Prolia 60 mg/mL subcutaneous solution: subcutaneous every 6 months (05 Aug 2023 01:19)  simvastatin 20 mg oral tablet: 1 tab(s) orally once a day (at bedtime) (05 Aug 2023 01:19)  Trelegy Ellipta 100 mcg-62.5 mcg-25 mcg/inh inhalation powder: 1 puff(s) inhaled once a day (05 Aug 2023 01:19)  Vitamin D3 25 mcg (1000 intl units) oral tablet: 1 tab(s) orally once a day (05 Aug 2023 01:19)  zolpidem 5 mg oral tablet: 1 tab(s) orally once a day (at bedtime), As Needed (05 Aug 2023 01:19)    MEDICATIONS  (STANDING):  acetaminophen     Tablet .. 975 milliGRAM(s) Oral every 8 hours  albuterol    0.083% 2.5 milliGRAM(s) Nebulizer every 6 hours  albuterol    90 MICROgram(s) HFA Inhaler 1 Puff(s) Inhalation every 4 hours  allopurinol 300 milliGRAM(s) Oral daily  apixaban 5 milliGRAM(s) Oral every 12 hours  budesonide 160 MICROgram(s)/formoterol 4.5 MICROgram(s) Inhaler 2 Puff(s) Inhalation two times a day  dextrose 5%. 1000 milliLiter(s) (100 mL/Hr) IV Continuous <Continuous>  dextrose 5%. 1000 milliLiter(s) (50 mL/Hr) IV Continuous <Continuous>  dextrose 50% Injectable 25 Gram(s) IV Push once  dextrose 50% Injectable 25 Gram(s) IV Push once  dextrose 50% Injectable 12.5 Gram(s) IV Push once  diltiazem    milliGRAM(s) Oral daily  furosemide   Injectable 40 milliGRAM(s) IV Push once  glucagon  Injectable 1 milliGRAM(s) IntraMuscular once  insulin lispro (ADMELOG) corrective regimen sliding scale   SubCutaneous Before meals and at bedtime  metoprolol succinate ER 25 milliGRAM(s) Oral daily  polyethylene glycol 3350 17 Gram(s) Oral daily  senna 2 Tablet(s) Oral at bedtime  simvastatin 20 milliGRAM(s) Oral at bedtime  tiotropium 2.5 MICROgram(s) Inhaler 2 Puff(s) Inhalation daily  tranexamic acid IVPB 1000 milliGRAM(s) IV Intermittent once  tranexamic acid IVPB 1000 milliGRAM(s) IV Intermittent once    MEDICATIONS  (PRN):  aluminum hydroxide/magnesium hydroxide/simethicone Suspension 30 milliLiter(s) Oral every 4 hours PRN Dyspepsia  dextrose Oral Gel 15 Gram(s) Oral once PRN Blood Glucose LESS THAN 70 milliGRAM(s)/deciliter  dicyclomine 10 milliGRAM(s) Oral three times a day before meals PRN spasms  HYDROmorphone  Injectable 1 milliGRAM(s) IV Push every 4 hours PRN Severe Pain (7 - 10)  magnesium hydroxide Suspension 30 milliLiter(s) Oral daily PRN Constipation  melatonin 3 milliGRAM(s) Oral at bedtime PRN Insomnia  ondansetron Injectable 4 milliGRAM(s) IV Push every 6 hours PRN Nausea and/or Vomiting  oxyCODONE    IR 5 milliGRAM(s) Oral every 4 hours PRN Moderate Pain (4 - 6)  zolpidem 5 milliGRAM(s) Oral at bedtime PRN Insomnia      Allergies    aspirin (Stomach Upset)    Intolerances        Vital Signs Last 24 Hrs  T(C): 36.3 (08 Aug 2023 08:00), Max: 37.8 (07 Aug 2023 21:50)  T(F): 97.3 (08 Aug 2023 08:00), Max: 100 (07 Aug 2023 21:50)  HR: 79 (08 Aug 2023 08:00) (75 - 106)  BP: 121/42 (08 Aug 2023 08:00) (112/53 - 141/48)  BP(mean): 78 (07 Aug 2023 20:00) (73 - 78)  RR: 18 (08 Aug 2023 08:00) (18 - 28)  SpO2: 95% (08 Aug 2023 08:00) (87% - 97%)    Parameters below as of 08 Aug 2023 08:22  Patient On (Oxygen Delivery Method): mask, Venturi, 50%          PHYSICAL EXAMINATION:    NECK:  Supple. No lymphadenopathy. Jugular venous pressure not elevated. Carotids equal.   HEART:   The cardiac impulse has a normal quality. Reg., Nl S1 and S2.  There are no murmurs, rubs or gallops noted  CHEST:  Chest crackles to auscultation. Normal respiratory effort.  ABDOMEN:  Soft and nontender.   EXTREMITIES:  There is no edema.       LABS:                        7.1    8.67  )-----------( 206      ( 08 Aug 2023 07:23 )             22.7     08-08    136  |  104  |  12  ----------------------------<  142<H>  4.3   |  28  |  0.69    Ca    9.1      08 Aug 2023 07:23    TPro  x   /  Alb  2.5<L>  /  TBili  x   /  DBili  x   /  AST  x   /  ALT  x   /  AlkPhos  x   08-08      Urinalysis Basic - ( 08 Aug 2023 07:23 )    Color: x / Appearance: x / SG: x / pH: x  Gluc: 142 mg/dL / Ketone: x  / Bili: x / Urobili: x   Blood: x / Protein: x / Nitrite: x   Leuk Esterase: x / RBC: x / WBC x   Sq Epi: x / Non Sq Epi: x / Bacteria: x          Xray Chest 1 View- PORTABLE-Urgent (Xray Chest 1 View- PORTABLE-Urgent .) (08.07.23 @ 18:17) >  IMPRESSION:   RIGHT lower zone diffuse airspace disease.  Cardiomegaly..    < end of copied text >

## 2023-08-08 NOTE — PROGRESS NOTE ADULT - ASSESSMENT
PROBLEMS:    S/P Left hip fx  Paroxysmal Afib-on eliquis  Severe COPD-ON 6liter NC home oxygen  T2DM  HTN/HLD/DM  H/O HTN, HLD, DM, Paroxysmal Afib, COPD    PLAN:    pulmonary  with fever & desaturation & RLL infiltrates will do ct chest to r/o pneumonia if Positive required Rx with ABX  PT/oob  S/P ORIF, fracture, femur, neck, left-melisa hip arthroplasty   TITRATE FIO2  ANTI-COAGULANTS AS PER ORTHO  Nebs/AEROSOLS  Prn analgesia  [id number]  DVT ppx

## 2023-08-08 NOTE — PROGRESS NOTE ADULT - SUBJECTIVE AND OBJECTIVE BOX
CHIEF COMPLAINT/INTERVAL HISTORY:    Patient is a 82y old  Female who presents with a chief complaint of s/p fall with left hip fracture (05 Aug 2023 11:25)      HPI:  Pt is a 81 y/o female w/PMHx of HTN, HLD, DM, Paroxysmal Afib, ANU, COPD on 6L O2 at home, presents after fall c/o hip pain. Pt states she was walking in her house, tripped and fell onto L Hip. Pt noticed immediate pain and inability to ambulate s/p MF. No other orthopedic injuries or complaints. No HS/LOC. No hx of orthopedic surgeries in the past. Recently seen in ED on 6/22/23 for L Proximal humerus fx s/p MF, treated non-operatively. Hx of severe COPD on 6LNC at Home and AFib on Eliquis (Last Dose 6pm 8/4/23). (05 Aug 2023 07:44)      POD#3 cxr with persistent chf    Vital Signs Last 24 Hrs  T(C): 36.9 (07 Aug 2023 05:00), Max: 36.9 (06 Aug 2023 17:19)  T(F): 98.5 (07 Aug 2023 05:00), Max: 98.5 (06 Aug 2023 17:19)  HR: 78 (07 Aug 2023 08:00) (78 - 114)  BP: 112/42 (07 Aug 2023 08:00) (111/49 - 147/66)  BP(mean): 61 (07 Aug 2023 08:00) (58 - 101)  RR: 18 (07 Aug 2023 08:00) (14 - 28)  SpO2: 90% (07 Aug 2023 06:00) (87% - 95%)    Parameters below as of 07 Aug 2023 06:00  Patient On (Oxygen Delivery Method): nasal cannula  O2 Flow (L/min): 6        MEDICATIONS  (STANDING):  acetaminophen     Tablet .. 975 milliGRAM(s) Oral every 8 hours  albuterol    0.083% 2.5 milliGRAM(s) Nebulizer every 6 hours  albuterol    90 MICROgram(s) HFA Inhaler 1 Puff(s) Inhalation every 4 hours  allopurinol 300 milliGRAM(s) Oral daily  apixaban 5 milliGRAM(s) Oral every 12 hours  budesonide 160 MICROgram(s)/formoterol 4.5 MICROgram(s) Inhaler 2 Puff(s) Inhalation two times a day  glucagon  Injectable 1 milliGRAM(s) IntraMuscular once  insulin lispro (ADMELOG) corrective regimen sliding scale   SubCutaneous Before meals and at bedtime  metoprolol succinate ER 25 milliGRAM(s) Oral daily  polyethylene glycol 3350 17 Gram(s) Oral daily  senna 2 Tablet(s) Oral at bedtime  simvastatin 20 milliGRAM(s) Oral at bedtime  tiotropium 2.5 MICROgram(s) Inhaler 2 Puff(s) Inhalation daily  tranexamic acid IVPB 1000 milliGRAM(s) IV Intermittent once  tranexamic acid IVPB 1000 milliGRAM(s) IV Intermittent once        PHYSICAL EXAM:    GENERAL: NAD, well-groomed, well-developed  NERVOUS SYSTEM:  Alert & Oriented X3, Motor Strength 5/5 B/L upper and lower extremities; DTRs 2+ intact and symmetric  CHEST/LUNG: Clear to auscultation bilaterally; No rales, rhonchi, wheezing, or rubs  HEART: Regular rate and rhythm; No murmurs, rubs, or gallops  ABDOMEN: Soft, Nontender, Nondistended; Bowel sounds present  EXTREMITIES:  2+ Peripheral Pulses, No clubbing, cyanosis, or edema    LABS:                                                7.7    x     )-----------( x        ( 07 Aug 2023 10:32 )             25.3           CAPILLARY BLOOD GLUCOSE      POCT Blood Glucose.: 167 mg/dL (07 Aug 2023 20:53)  POCT Blood Glucose.: 181 mg/dL (07 Aug 2023 18:16)  POCT Blood Glucose.: 186 mg/dL (07 Aug 2023 13:48)      * Left hip fx  POD#3    * Abnormal CXR/ chf  check BNP      #T2DM  - ISS  - Monitor glucose, A1C    #HTN, HLD, DM, Paroxysmal Afib, COPD  - Continue with home meds- resume Eliquis                     CHIEF COMPLAINT/INTERVAL HISTORY:    Patient is a 82y old  Female who presents with a chief complaint of s/p fall with left hip fracture (05 Aug 2023 11:25)      HPI:  Pt is a 83 y/o female w/PMHx of HTN, HLD, DM, Paroxysmal Afib, ANU, COPD on 6L O2 at home, presents after fall c/o hip pain. Pt states she was walking in her house, tripped and fell onto L Hip. Pt noticed immediate pain and inability to ambulate s/p MF. No other orthopedic injuries or complaints. No HS/LOC. No hx of orthopedic surgeries in the past. Recently seen in ED on 6/22/23 for L Proximal humerus fx s/p MF, treated non-operatively. Hx of severe COPD on 6LNC at Home and AFib on Eliquis (Last Dose 6pm 8/4/23). (05 Aug 2023 07:44)      POD#3 cxr with persistent chf; had Tm 102 blood cx done now aerobic + for GPC clusters    Vital Signs Last 24 Hrs  T(C): 36.9 (07 Aug 2023 05:00), Max: 36.9 (06 Aug 2023 17:19)  T(F): 98.5 (07 Aug 2023 05:00), Max: 98.5 (06 Aug 2023 17:19)  HR: 78 (07 Aug 2023 08:00) (78 - 114)  BP: 112/42 (07 Aug 2023 08:00) (111/49 - 147/66)  BP(mean): 61 (07 Aug 2023 08:00) (58 - 101)  RR: 18 (07 Aug 2023 08:00) (14 - 28)  SpO2: 90% (07 Aug 2023 06:00) (87% - 95%)    Parameters below as of 07 Aug 2023 06:00  Patient On (Oxygen Delivery Method): nasal cannula  O2 Flow (L/min): 6        MEDICATIONS  (STANDING):  acetaminophen     Tablet .. 975 milliGRAM(s) Oral every 8 hours  albuterol    0.083% 2.5 milliGRAM(s) Nebulizer every 6 hours  albuterol    90 MICROgram(s) HFA Inhaler 1 Puff(s) Inhalation every 4 hours  allopurinol 300 milliGRAM(s) Oral daily  apixaban 5 milliGRAM(s) Oral every 12 hours  budesonide 160 MICROgram(s)/formoterol 4.5 MICROgram(s) Inhaler 2 Puff(s) Inhalation two times a day  glucagon  Injectable 1 milliGRAM(s) IntraMuscular once  insulin lispro (ADMELOG) corrective regimen sliding scale   SubCutaneous Before meals and at bedtime  metoprolol succinate ER 25 milliGRAM(s) Oral daily  polyethylene glycol 3350 17 Gram(s) Oral daily  senna 2 Tablet(s) Oral at bedtime  simvastatin 20 milliGRAM(s) Oral at bedtime  tiotropium 2.5 MICROgram(s) Inhaler 2 Puff(s) Inhalation daily  tranexamic acid IVPB 1000 milliGRAM(s) IV Intermittent once  tranexamic acid IVPB 1000 milliGRAM(s) IV Intermittent once        PHYSICAL EXAM:    GENERAL: NAD, well-groomed, well-developed  NERVOUS SYSTEM:  Alert & Oriented X3, Motor Strength 5/5 B/L upper and lower extremities; DTRs 2+ intact and symmetric  CHEST/LUNG: Clear to auscultation bilaterally; No rales, rhonchi, wheezing, or rubs  HEART: Regular rate and rhythm; No murmurs, rubs, or gallops  ABDOMEN: Soft, Nontender, Nondistended; Bowel sounds present  EXTREMITIES:  2+ Peripheral Pulses, No clubbing, cyanosis, or edema    LABS:                                                7.7    x     )-----------( x        ( 07 Aug 2023 10:32 )             25.3           CAPILLARY BLOOD GLUCOSE      POCT Blood Glucose.: 167 mg/dL (07 Aug 2023 20:53)  POCT Blood Glucose.: 181 mg/dL (07 Aug 2023 18:16)  POCT Blood Glucose.: 186 mg/dL (07 Aug 2023 13:48)      * Left hip fx  POD#3  aerobic bottle GPC, probably contaminant repeat bl cx and observe off abx    * Abnormal CXR/ chf  check BNP      #T2DM  - ISS  - Monitor glucose, A1C    #HTN, HLD, DM, Paroxysmal Afib, COPD  - Continue with home meds- resume Eliquis

## 2023-08-08 NOTE — PROGRESS NOTE ADULT - SUBJECTIVE AND OBJECTIVE BOX
Pt seen at bedside this AM. No acute complaints. Pain is well managed. Denies numbness, tingling, fevers, chills, CP, or SOB.    Vital Signs (24 Hrs):  T(C): 36.5 (08-08-23 @ 04:00), Max: 37.8 (08-07-23 @ 21:50)  HR: 86 (08-08-23 @ 04:00) (75 - 106)  BP: 130/53 (08-08-23 @ 04:00) (112/42 - 141/48)  RR: 19 (08-08-23 @ 04:00) (15 - 28)  SpO2: 95% (08-08-23 @ 04:00) (87% - 97%)  Wt(kg): --    LABS:                          7.7    x     )-----------( x        ( 07 Aug 2023 10:32 )             25.3     08-07    136  |  104  |  12  ----------------------------<  141<H>  4.6   |  29  |  0.87    Ca    9.0      07 Aug 2023 05:16    TPro  x   /  Alb  2.7<L>  /  TBili  x   /  DBili  x   /  AST  x   /  ALT  x   /  AlkPhos  x   08-07    LIVER FUNCTIONS - ( 07 Aug 2023 13:57 )  Alb: 2.7 g/dL / Pro: x     / ALK PHOS: x     / ALT: x     / AST: x     / GGT: x           Physical Exam:  General: NAD, pt laying in bed comfortably with nebulizer  Compartments soft, compressible  Calves nontender  SCDs in place  Abduction pillow in place    LLE:  Dressings C/D/I  Motor: +GSC, TA, EHL, FHL  SILT: SPN, DPN, Tib, Saph, Mandi  +DP    A/P  Pt is a 82F POD3 L hip melisa    H/H: 7.7/25.3 would recommend 1U pRBC  WBAT, posterior hip precautions  Abduction pillow in place when laying in bed or sitting  PT/OT  Analgesics  DVT ppx per primary team, currently on Eliquis  Rest, compress, ice, elevate extremity as needed  Incentive spirometry  Appreciate medical recs  Discussed plan with Dr. Cárdenas who agrees with above

## 2023-08-08 NOTE — PROGRESS NOTE ADULT - ASSESSMENT
83 y/o female w/PMHx of HTN, HLD, DM, Paroxysmal Afib, ANU, COPD on 6L O2 at home, presents after fall c/o hip pain. Pt states she was walking in her house, tripped and fell onto L Hip. Pt noticed immediate pain and inability to ambulate s/p MF. No other orthopedic injuries or complaints. No HS/LOC. No hx of orthopedic surgeries in the past. Recently seen in ED on 6/22/23 for L Proximal humerus fx s/p MF, treated non-operatively. Hx of severe COPD on 6LNC at Home and AFib on Eliquis (Last Dose 6pm 8/4/23).     #Acute on chronic hypoxemic respiratory failure. Multifactorial - CHF and Anemia. Recommend IV Lasix 40mg daily, +/- PRBCs. Lasix after PRBCs if ordered.  #Left femoral neck fracture. Stable S/P ORIF on 8/5.  #Moderate aortic stenosis. Requires no treatment.  #PAF.  Maintaining RSR. Continue metoprolol and resume oral anticoagulation when OK with orthopedist. H&H noted to be downtrended.   #HTN. Continue metoprolol, diltiazem.  #HLD. Continue statin.  #COPD. Continue oxygen, inhalers.  #IDDMT 2. Insulin coverage        Case d/w Dr. Way  Will follow

## 2023-08-09 LAB
ANION GAP SERPL CALC-SCNC: 5 MMOL/L — SIGNIFICANT CHANGE UP (ref 5–17)
BASOPHILS # BLD AUTO: 0.03 K/UL — SIGNIFICANT CHANGE UP (ref 0–0.2)
BASOPHILS NFR BLD AUTO: 0.3 % — SIGNIFICANT CHANGE UP (ref 0–2)
BUN SERPL-MCNC: 11 MG/DL — SIGNIFICANT CHANGE UP (ref 7–23)
CALCIUM SERPL-MCNC: 9.1 MG/DL — SIGNIFICANT CHANGE UP (ref 8.5–10.1)
CHLORIDE SERPL-SCNC: 103 MMOL/L — SIGNIFICANT CHANGE UP (ref 96–108)
CO2 SERPL-SCNC: 30 MMOL/L — SIGNIFICANT CHANGE UP (ref 22–31)
CREAT SERPL-MCNC: 0.72 MG/DL — SIGNIFICANT CHANGE UP (ref 0.5–1.3)
EGFR: 82 ML/MIN/1.73M2 — SIGNIFICANT CHANGE UP
EOSINOPHIL # BLD AUTO: 0 K/UL — SIGNIFICANT CHANGE UP (ref 0–0.5)
EOSINOPHIL NFR BLD AUTO: 0 % — SIGNIFICANT CHANGE UP (ref 0–6)
GLUCOSE BLDC GLUCOMTR-MCNC: 148 MG/DL — HIGH (ref 70–99)
GLUCOSE BLDC GLUCOMTR-MCNC: 158 MG/DL — HIGH (ref 70–99)
GLUCOSE BLDC GLUCOMTR-MCNC: 169 MG/DL — HIGH (ref 70–99)
GLUCOSE SERPL-MCNC: 156 MG/DL — HIGH (ref 70–99)
HCT VFR BLD CALC: 26.2 % — LOW (ref 34.5–45)
HGB BLD-MCNC: 8.2 G/DL — LOW (ref 11.5–15.5)
IMM GRANULOCYTES NFR BLD AUTO: 0.5 % — SIGNIFICANT CHANGE UP (ref 0–0.9)
LYMPHOCYTES # BLD AUTO: 0.73 K/UL — LOW (ref 1–3.3)
LYMPHOCYTES # BLD AUTO: 8.3 % — LOW (ref 13–44)
MCHC RBC-ENTMCNC: 25 PG — LOW (ref 27–34)
MCHC RBC-ENTMCNC: 31.3 GM/DL — LOW (ref 32–36)
MCV RBC AUTO: 79.9 FL — LOW (ref 80–100)
MONOCYTES # BLD AUTO: 0.65 K/UL — SIGNIFICANT CHANGE UP (ref 0–0.9)
MONOCYTES NFR BLD AUTO: 7.4 % — SIGNIFICANT CHANGE UP (ref 2–14)
NEUTROPHILS # BLD AUTO: 7.37 K/UL — SIGNIFICANT CHANGE UP (ref 1.8–7.4)
NEUTROPHILS NFR BLD AUTO: 83.5 % — HIGH (ref 43–77)
PLATELET # BLD AUTO: 273 K/UL — SIGNIFICANT CHANGE UP (ref 150–400)
POTASSIUM SERPL-MCNC: 3.9 MMOL/L — SIGNIFICANT CHANGE UP (ref 3.5–5.3)
POTASSIUM SERPL-SCNC: 3.9 MMOL/L — SIGNIFICANT CHANGE UP (ref 3.5–5.3)
RBC # BLD: 3.28 M/UL — LOW (ref 3.8–5.2)
RBC # FLD: 22.7 % — HIGH (ref 10.3–14.5)
SODIUM SERPL-SCNC: 138 MMOL/L — SIGNIFICANT CHANGE UP (ref 135–145)
WBC # BLD: 8.82 K/UL — SIGNIFICANT CHANGE UP (ref 3.8–10.5)
WBC # FLD AUTO: 8.82 K/UL — SIGNIFICANT CHANGE UP (ref 3.8–10.5)

## 2023-08-09 PROCEDURE — 99232 SBSQ HOSP IP/OBS MODERATE 35: CPT

## 2023-08-09 RX ADMIN — Medication 975 MILLIGRAM(S): at 21:38

## 2023-08-09 RX ADMIN — ALBUTEROL 2.5 MILLIGRAM(S): 90 AEROSOL, METERED ORAL at 13:23

## 2023-08-09 RX ADMIN — BUDESONIDE AND FORMOTEROL FUMARATE DIHYDRATE 2 PUFF(S): 160; 4.5 AEROSOL RESPIRATORY (INHALATION) at 21:24

## 2023-08-09 RX ADMIN — Medication 300 MILLIGRAM(S): at 09:34

## 2023-08-09 RX ADMIN — Medication 240 MILLIGRAM(S): at 09:35

## 2023-08-09 RX ADMIN — OXYCODONE HYDROCHLORIDE 5 MILLIGRAM(S): 5 TABLET ORAL at 09:34

## 2023-08-09 RX ADMIN — Medication 1: at 21:39

## 2023-08-09 RX ADMIN — BUDESONIDE AND FORMOTEROL FUMARATE DIHYDRATE 2 PUFF(S): 160; 4.5 AEROSOL RESPIRATORY (INHALATION) at 07:59

## 2023-08-09 RX ADMIN — Medication 1: at 14:14

## 2023-08-09 RX ADMIN — ALBUTEROL 2.5 MILLIGRAM(S): 90 AEROSOL, METERED ORAL at 07:58

## 2023-08-09 RX ADMIN — Medication 40 MILLIGRAM(S): at 10:18

## 2023-08-09 RX ADMIN — SIMVASTATIN 20 MILLIGRAM(S): 20 TABLET, FILM COATED ORAL at 21:39

## 2023-08-09 RX ADMIN — Medication 975 MILLIGRAM(S): at 05:51

## 2023-08-09 RX ADMIN — Medication 975 MILLIGRAM(S): at 21:43

## 2023-08-09 RX ADMIN — OXYCODONE HYDROCHLORIDE 5 MILLIGRAM(S): 5 TABLET ORAL at 10:55

## 2023-08-09 RX ADMIN — POLYETHYLENE GLYCOL 3350 17 GRAM(S): 17 POWDER, FOR SOLUTION ORAL at 10:20

## 2023-08-09 RX ADMIN — APIXABAN 5 MILLIGRAM(S): 2.5 TABLET, FILM COATED ORAL at 09:35

## 2023-08-09 RX ADMIN — Medication 975 MILLIGRAM(S): at 05:53

## 2023-08-09 RX ADMIN — ALBUTEROL 2.5 MILLIGRAM(S): 90 AEROSOL, METERED ORAL at 21:24

## 2023-08-09 RX ADMIN — Medication 25 MILLIGRAM(S): at 09:34

## 2023-08-09 RX ADMIN — OXYCODONE HYDROCHLORIDE 5 MILLIGRAM(S): 5 TABLET ORAL at 16:37

## 2023-08-09 RX ADMIN — TIOTROPIUM BROMIDE 2 PUFF(S): 18 CAPSULE ORAL; RESPIRATORY (INHALATION) at 07:58

## 2023-08-09 RX ADMIN — APIXABAN 5 MILLIGRAM(S): 2.5 TABLET, FILM COATED ORAL at 21:38

## 2023-08-09 NOTE — PROGRESS NOTE ADULT - SUBJECTIVE AND OBJECTIVE BOX
Subjective:    pat better, siting in chair, no respiratory complaints, CT chest trace pleural effusion,     Home Medications:  allopurinol 300 mg oral tablet: 1 tab(s) orally once a day (05 Aug 2023 01:19)  Apriso 0.375 g oral capsule, extended release: 4 cap(s) orally once a day (05 Aug 2023 01:19)  Cartia  mg/24 hours oral capsule, extended release: 1 cap(s) orally once a day (05 Aug 2023 01:19)  CoQ10: orally once a day (05 Aug 2023 01:19)  dicyclomine 10 mg oral capsule: 1 cap(s) orally 3 times a day, As Needed (05 Aug 2023 01:19)  Eliquis 5 mg oral tablet: 1 tab(s) orally 2 times a day (05 Aug 2023 01:19)  metFORMIN 500 mg oral tablet: 2 tab(s) orally once a day (in the morning) (05 Aug 2023 01:19)  metFORMIN 500 mg oral tablet: 1 tab(s) orally once (at bedtime) (05 Aug 2023 01:19)  Metoprolol Succinate ER 25 mg oral tablet, extended release: 1 tab(s) orally once a day (05 Aug 2023 01:19)  Pepcid Complete: *** as needed*** (05 Aug 2023 04:34)  Probiotic Formula oral capsule: 1 cap(s) orally 2 times a day (05 Aug 2023 01:19)  Prolia 60 mg/mL subcutaneous solution: subcutaneous every 6 months (05 Aug 2023 01:19)  simvastatin 20 mg oral tablet: 1 tab(s) orally once a day (at bedtime) (05 Aug 2023 01:19)  Trelegy Ellipta 100 mcg-62.5 mcg-25 mcg/inh inhalation powder: 1 puff(s) inhaled once a day (05 Aug 2023 01:19)  Vitamin D3 25 mcg (1000 intl units) oral tablet: 1 tab(s) orally once a day (05 Aug 2023 01:19)  zolpidem 5 mg oral tablet: 1 tab(s) orally once a day (at bedtime), As Needed (05 Aug 2023 01:19)    MEDICATIONS  (STANDING):  acetaminophen     Tablet .. 975 milliGRAM(s) Oral every 8 hours  albuterol    0.083% 2.5 milliGRAM(s) Nebulizer every 6 hours  albuterol    90 MICROgram(s) HFA Inhaler 1 Puff(s) Inhalation every 4 hours  allopurinol 300 milliGRAM(s) Oral daily  apixaban 5 milliGRAM(s) Oral every 12 hours  budesonide 160 MICROgram(s)/formoterol 4.5 MICROgram(s) Inhaler 2 Puff(s) Inhalation two times a day  dextrose 5%. 1000 milliLiter(s) (50 mL/Hr) IV Continuous <Continuous>  dextrose 5%. 1000 milliLiter(s) (100 mL/Hr) IV Continuous <Continuous>  dextrose 50% Injectable 25 Gram(s) IV Push once  dextrose 50% Injectable 12.5 Gram(s) IV Push once  dextrose 50% Injectable 25 Gram(s) IV Push once  diltiazem    milliGRAM(s) Oral daily  furosemide   Injectable 40 milliGRAM(s) IV Push daily  glucagon  Injectable 1 milliGRAM(s) IntraMuscular once  insulin lispro (ADMELOG) corrective regimen sliding scale   SubCutaneous Before meals and at bedtime  metoprolol succinate ER 25 milliGRAM(s) Oral daily  polyethylene glycol 3350 17 Gram(s) Oral daily  senna 2 Tablet(s) Oral at bedtime  simvastatin 20 milliGRAM(s) Oral at bedtime  tiotropium 2.5 MICROgram(s) Inhaler 2 Puff(s) Inhalation daily  tranexamic acid IVPB 1000 milliGRAM(s) IV Intermittent once  tranexamic acid IVPB 1000 milliGRAM(s) IV Intermittent once    MEDICATIONS  (PRN):  aluminum hydroxide/magnesium hydroxide/simethicone Suspension 30 milliLiter(s) Oral every 4 hours PRN Dyspepsia  dextrose Oral Gel 15 Gram(s) Oral once PRN Blood Glucose LESS THAN 70 milliGRAM(s)/deciliter  dicyclomine 10 milliGRAM(s) Oral three times a day before meals PRN spasms  HYDROmorphone  Injectable 1 milliGRAM(s) IV Push every 4 hours PRN Severe Pain (7 - 10)  magnesium hydroxide Suspension 30 milliLiter(s) Oral daily PRN Constipation  melatonin 3 milliGRAM(s) Oral at bedtime PRN Insomnia  ondansetron Injectable 4 milliGRAM(s) IV Push every 6 hours PRN Nausea and/or Vomiting  oxyCODONE    IR 5 milliGRAM(s) Oral every 4 hours PRN Moderate Pain (4 - 6)  zolpidem 5 milliGRAM(s) Oral at bedtime PRN Insomnia      Allergies    aspirin (Stomach Upset)    Intolerances        Vital Signs Last 24 Hrs  T(C): 36.5 (09 Aug 2023 08:01), Max: 37.2 (08 Aug 2023 15:34)  T(F): 97.7 (09 Aug 2023 08:01), Max: 99 (08 Aug 2023 15:34)  HR: 80 (09 Aug 2023 13:28) (77 - 89)  BP: 124/52 (09 Aug 2023 08:01) (107/50 - 132/52)  BP(mean): 68 (08 Aug 2023 18:45) (68 - 71)  RR: 18 (09 Aug 2023 08:01) (16 - 18)  SpO2: 94% (09 Aug 2023 08:10) (91% - 94%)    Parameters below as of 09 Aug 2023 08:10  Patient On (Oxygen Delivery Method): nasal cannula          PHYSICAL EXAMINATION:    NECK:  Supple. No lymphadenopathy. Jugular venous pressure not elevated. Carotids equal.   HEART:   The cardiac impulse has a normal quality. Reg., Nl S1 and S2.  There are no murmurs, rubs or gallops noted  CHEST:  Chest crackles to auscultation. Normal respiratory effort.  ABDOMEN:  Soft and nontender.   EXTREMITIES:  There is no edema.       LABS:                        8.2    8.82  )-----------( 273      ( 09 Aug 2023 07:24 )             26.2     08-09    138  |  103  |  11  ----------------------------<  156<H>  3.9   |  30  |  0.72    Ca    9.1      09 Aug 2023 07:24    TPro  x   /  Alb  2.5<L>  /  TBili  x   /  DBili  x   /  AST  x   /  ALT  x   /  AlkPhos  x   08-08      Urinalysis Basic - ( 09 Aug 2023 07:24 )    Color: x / Appearance: x / SG: x / pH: x  Gluc: 156 mg/dL / Ketone: x  / Bili: x / Urobili: x   Blood: x / Protein: x / Nitrite: x   Leuk Esterase: x / RBC: x / WBC x   Sq Epi: x / Non Sq Epi: x / Bacteria: x        CT Chest No Cont (08.08.23 @ 14:47) >  IMPRESSION:    Stable subsegmental atelectasis of the lingula. Mild interlobular septal   thickening within the lung bases. The lungs are otherwise clear. Trace   right pleural effusion.

## 2023-08-09 NOTE — PROGRESS NOTE ADULT - ASSESSMENT
PROBLEMS:    S/P Left hip fx  Paroxysmal Afib-on eliquis  Severe COPD-ON 6liter NC home oxygen  T2DM  HTN/HLD/DM  H/O HTN, HLD, DM, Paroxysmal Afib, COPD    PLAN:    pulmonary  with fever & desaturation & RLL infiltrates will do ct chest to r/o pneumonia if Positive required Rx with ABX  PT/oob  S/P ORIF, fracture, femur, neck, left-melisa hip arthroplasty   TITRATE FIO2  ANTI-COAGULANTS AS PER ORTHO  Nebs/AEROSOLS  Prn analgesia  [id number]  DVT ppx

## 2023-08-09 NOTE — PROGRESS NOTE ADULT - SUBJECTIVE AND OBJECTIVE BOX
CHIEF COMPLAINT/INTERVAL HISTORY:    Patient is a 82y old  Female who presents with a chief complaint of s/p fall with left hip fracture (05 Aug 2023 11:25)      HPI:  Pt is a 81 y/o female w/PMHx of HTN, HLD, DM, Paroxysmal Afib, ANU, COPD on 6L O2 at home, presents after fall c/o hip pain. Pt states she was walking in her house, tripped and fell onto L Hip. Pt noticed immediate pain and inability to ambulate s/p MF. No other orthopedic injuries or complaints. No HS/LOC. No hx of orthopedic surgeries in the past. Recently seen in ED on 6/22/23 for L Proximal humerus fx s/p MF, treated non-operatively. Hx of severe COPD on 6LNC at Home and AFib on Eliquis (Last Dose 6pm 8/4/23). (05 Aug 2023 07:44)      POD#4  had Tm 102 blood cx done now aerobic + for Staph epidermidis   Bl cx repeated yesterday; for anemia she was transfused yesterday; there is suspicion for HF based on cxr;  CT was done because read of PNA- not present    Vital Signs Last 24 Hrs  T(C): 36.5 (09 Aug 2023 08:01), Max: 37.2 (08 Aug 2023 15:34)  T(F): 97.7 (09 Aug 2023 08:01), Max: 99 (08 Aug 2023 15:34)  HR: 77 (09 Aug 2023 08:10) (77 - 92)  BP: 124/52 (09 Aug 2023 08:01) (107/50 - 132/52)  BP(mean): 68 (08 Aug 2023 18:45) (68 - 71)  RR: 18 (09 Aug 2023 08:01) (16 - 18)  SpO2: 94% (09 Aug 2023 08:10) (91% - 94%)    Parameters below as of 09 Aug 2023 08:10  Patient On (Oxygen Delivery Method): nasal cannula          MEDICATIONS  (STANDING):  acetaminophen     Tablet .. 975 milliGRAM(s) Oral every 8 hours  albuterol    0.083% 2.5 milliGRAM(s) Nebulizer every 6 hours  albuterol    90 MICROgram(s) HFA Inhaler 1 Puff(s) Inhalation every 4 hours  allopurinol 300 milliGRAM(s) Oral daily  apixaban 5 milliGRAM(s) Oral every 12 hours  budesonide 160 MICROgram(s)/formoterol 4.5 MICROgram(s) Inhaler 2 Puff(s) Inhalation two times a day  diltiazem    milliGRAM(s) Oral daily  glucagon  Injectable 1 milliGRAM(s) IntraMuscular once  insulin lispro (ADMELOG) corrective regimen sliding scale   SubCutaneous Before meals and at bedtime  metoprolol succinate ER 25 milliGRAM(s) Oral daily  polyethylene glycol 3350 17 Gram(s) Oral daily  senna 2 Tablet(s) Oral at bedtime  simvastatin 20 milliGRAM(s) Oral at bedtime  tiotropium 2.5 MICROgram(s) Inhaler 2 Puff(s) Inhalation daily  tranexamic acid IVPB 1000 milliGRAM(s) IV Intermittent once  tranexamic acid IVPB 1000 milliGRAM(s) IV Intermittent once    MEDICATIONS  (PRN):  aluminum hydroxide/magnesium hydroxide/simethicone Suspension 30 milliLiter(s) Oral every 4 hours PRN Dyspepsia  dextrose Oral Gel 15 Gram(s) Oral once PRN Blood Glucose LESS THAN 70 milliGRAM(s)/deciliter  dicyclomine 10 milliGRAM(s) Oral three times a day before meals PRN spasms  HYDROmorphone  Injectable 1 milliGRAM(s) IV Push every 4 hours PRN Severe Pain (7 - 10)  magnesium hydroxide Suspension 30 milliLiter(s) Oral daily PRN Constipation  melatonin 3 milliGRAM(s) Oral at bedtime PRN Insomnia  ondansetron Injectable 4 milliGRAM(s) IV Push every 6 hours PRN Nausea and/or Vomiting  oxyCODONE    IR 5 milliGRAM(s) Oral every 4 hours PRN Moderate Pain (4 - 6)  zolpidem 5 milliGRAM(s) Oral at bedtime PRN Insomnia  once        PHYSICAL EXAM:    GENERAL: NAD, well-groomed, well-developed  NERVOUS SYSTEM:  Alert & Oriented X3, Motor Strength 5/5 B/L upper and lower extremities; DTRs 2+ intact and symmetric  CHEST/LUNG: Clear to auscultation bilaterally; No rales, rhonchi, wheezing, or rubs  HEART: Regular rate and rhythm; No murmurs, rubs, or gallops  ABDOMEN: Soft, Nontender, Nondistended; Bowel sounds present  EXTREMITIES:  2+ Peripheral Pulses, No clubbing, cyanosis, or edema    LABS:                                     8.2    8.82  )-----------( 273      ( 09 Aug 2023 07:24 )             26.2         TTE Echo Complete w/o Contrast w/ Doppler (08.05.23 @ 10:29) >     The aortic valve is well visualized, appears moderately calcified. Valve   opening seems to be restricted.   Moderate aortic stenosis is present based on RONALD 1.2 cm2.   Peak and mean transaortic gradients are 39 and 21 mmHg respectively.   The left atrium is moderately dilated.   Estimated left ventricular ejection fraction is 60-65 %.   The left ventricle is normal in size, wall motion and contractility as   seen in limited views.   Mild concentric left ventricular hypertrophy is present.   All visualized extra cardiac structures appears to be normal.   There is calcification of both mitral valve leaflets. The leaflet opening   is normal.   Mild mitral annular calcification is present.   Mild (1+) mitral regurgitation is present.   EA reversal of the mitral inflow consistent with reduced compliance of   the   left ventricle.   No evidence of pericardial effusion.   No evidence of pleural effusion.   Normal appearing pulmonic valve structure and function.   The right atrium appears mildly dilated.   The right ventricle is mildly dilated.   Normal appearing right ventricle function.   Normal appearing tricuspid valve structure.   Mild to Moderate Tricuspid regurgitation is present.   Moderate pulmonary hypertension.    12 Lead ECG (08.05.23 @ 11:37) >    Diagnosis Line Normal sinus rhythm  Right bundle branch block  Abnormal ECG        CAPILLARY BLOOD GLUCOSE      POCT Blood Glucose.: 213 mg/dL (08 Aug 2023 21:19)  POCT Blood Glucose.: 169 mg/dL (08 Aug 2023 17:02)  POCT Blood Glucose.: 193 mg/dL (08 Aug 2023 12:40)      * Left hip fx  POD#4  aerobic bottle GPC, probably contaminant repeat bl cx and observe off abx- pending    * Abnormal CXR/ chf- diastolic; moderate AS  check BNP: from 800 to 4000; lasix was added    * ABLA  s/p prbc; needs to remain hospitalize to ensure stable HH has severe COPD with 6L O2 at home    #T2DM  - ISS  - Monitor glucose, A1C= 6.4 well ctr at home    #HTN, HLD, DM, Paroxysmal Afib, severe COPD on 6L NC at home, moderate AS  - Continue with home meds- c/w Eliquis

## 2023-08-09 NOTE — PROGRESS NOTE ADULT - ASSESSMENT
81 y/o female w/PMHx of HTN, HLD, DM, Paroxysmal Afib, ANU, COPD on 6L O2 at home, presents after fall c/o hip pain. Pt states she was walking in her house, tripped and fell onto L Hip. Pt noticed immediate pain and inability to ambulate s/p MF. No other orthopedic injuries or complaints. No HS/LOC. No hx of orthopedic surgeries in the past. Recently seen in ED on 6/22/23 for L Proximal humerus fx s/p MF, treated non-operatively. Hx of severe COPD on 6LNC at Home and AFib on Eliquis (Last Dose 6pm 8/4/23).     #Acute on chronic hypoxemic respiratory failure. Multifactorial - CHF and Anemia. Recommend IV Lasix 40mg daily, +/- PRBCs. Lasix after PRBCs if ordered. C/w daily IV Lasix for now and montior.  #Left femoral neck fracture. S/P ORIF on 8/5.  #Moderate aortic stenosis. Requires no treatment.  #PAF.  Maintaining RSR. Continue metoprolol and Eliquis. Monitor H&H.  #HTN. Continue metoprolol, diltiazem.  #HLD. Continue statin.  #COPD. Continue oxygen, inhalers. BL 02 6L  #IDDMT 2. Insulin coverage        Case d/w Dr. Mortensen  Will follow

## 2023-08-09 NOTE — PROGRESS NOTE ADULT - SUBJECTIVE AND OBJECTIVE BOX
Pt seen at bedside this AM. No acute complaints. Pain is well managed. Denies numbness, tingling, fevers, chills, CP, or SOB.      LABS:                        7.1    8.67  )-----------( 206      ( 08 Aug 2023 07:23 )             22.7     08-08    136  |  104  |  12  ----------------------------<  142<H>  4.3   |  28  |  0.69    Ca    9.1      08 Aug 2023 07:23    TPro  x   /  Alb  2.5<L>  /  TBili  x   /  DBili  x   /  AST  x   /  ALT  x   /  AlkPhos  x   08-08      Urinalysis Basic - ( 08 Aug 2023 07:23 )    Color: x / Appearance: x / SG: x / pH: x  Gluc: 142 mg/dL / Ketone: x  / Bili: x / Urobili: x   Blood: x / Protein: x / Nitrite: x   Leuk Esterase: x / RBC: x / WBC x   Sq Epi: x / Non Sq Epi: x / Bacteria: x        VITAL SIGNS:  T(C): 37 (08-09-23 @ 04:04), Max: 37.2 (08-08-23 @ 15:34)  HR: 83 (08-09-23 @ 04:04) (79 - 92)  BP: 119/57 (08-09-23 @ 04:04) (107/50 - 132/52)  RR: 18 (08-09-23 @ 04:04) (16 - 18)  SpO2: 91% (08-09-23 @ 04:04) (91% - 95%)      Physical Exam:  General: NAD, pt laying in bed comfortably with nebulizer  Compartments soft, compressible  Calves nontender  SCDs in place  Abduction pillow in place    LLE:  Dressings C/D/I  Motor: +GSC, TA, EHL, FHL  SILT: SPN, DPN, Tib, Saph, Mandi  +DP    A/P  Pt is a 82F POD4 L hip melisa    WBAT, posterior hip precautions  Abduction pillow in place when laying in bed or sitting  PT/OT  Analgesics  DVT ppx per primary team, currently on Eliquis  Rest, compress, ice, elevate extremity as needed  Incentive spirometry  Appreciate medical recs  FU ID Consult for abx recommendations for positive Blood cultures  Discussed plan with Dr. Cárdenas who agrees with above

## 2023-08-09 NOTE — CONSULT NOTE ADULT - ASSESSMENT
81 y/o female with h/o HTN, HLD, DM, Paroxysmal Afib, ANU, COPD on 6L O2 at home was admitted on 8/5 for left hip pain, weakness after fall. Pt states she was walking in her house, tripped and fell onto L Hip. Pt noticed immediate pain and inability to ambulate s/p MF. No other orthopedic injuries or complaints. No HS/LOC. No hx of orthopedic surgeries in the past. Recently seen in ED on 6/22/23 for L Proximal humerus fx s/p MF, treated non-operatively. On 8/5 she underwent a left hip hemiarthroplasty. On 8/7 she deveveloped fever tp 102F an blood cultures were collected. On 8/9 she was reported with bacteremia. Concern about an infectious process was raised.     1. Febrile syndrome. Bacteremia with CNST. Left hip fracture s/p hemiarthroplasty.  -cultures reviewed     83 y/o female with h/o HTN, HLD, DM, Paroxysmal Afib, ANU, COPD on 6L O2 at home was admitted on 8/5 for left hip pain, weakness after fall. Pt states she was walking in her house, tripped and fell onto L Hip. Pt noticed immediate pain and inability to ambulate s/p MF. No other orthopedic injuries or complaints. No HS/LOC. No hx of orthopedic surgeries in the past. Recently seen in ED on 6/22/23 for L Proximal humerus fx s/p MF, treated non-operatively. On 8/5 she underwent a left hip hemiarthroplasty. On 8/7 she deveveloped fever tp 102F an blood cultures were collected. On 8/9 she was reported with bacteremia. Concern about an infectious process was raised.     1. Febrile syndrome. Bacteremia with CNST. Left hip fracture s/p hemiarthroplasty.  -cultures reviewed  -has CNST growing in one BC bottle out of 4  -no clinical signs of sepsis  -postoperative left hip wound is clean  -f/u cultures  -would observe off abx at this time  -old chart reviewed to assess prior cultures  -monitor temps  -f/u CBC  -supportive care  2. Other issues:   -care per medicine    d/w medical team

## 2023-08-09 NOTE — PROGRESS NOTE ADULT - SUBJECTIVE AND OBJECTIVE BOX
CHIEF COMPLAINT: Patient is a 82y old  Female who presents with a chief complaint of sob (06 Aug 2023 09:03)    FROM H&P: Pt is a 81 y/o female w/PMHx of HTN, HLD, DM, Paroxysmal Afib, ANU, COPD on 6L O2 at home, presents after fall c/o hip pain. Pt states she was walking in her house, tripped and fell onto L Hip. Pt noticed immediate pain and inability to ambulate s/p MF. No other orthopedic injuries or complaints. No HS/LOC. No hx of orthopedic surgeries in the past. Recently seen in ED on 6/22/23 for L Proximal humerus fx s/p MF, treated non-operatively. Hx of severe COPD on 6LNC at Home and AFib on Eliquis (Last Dose 6pm 8/4/23). (05 Aug 2023 07:44)    8/5. Cardiology Consultation: Seen for Dr Way. Seen for preop cardiac evaluation. She tripped and fell on 8/4/23 and has a left hip fracture.  8/6. No complaints. S/P ORIF left femoral neck fracture 8/5.  8/7. Very tired, feeling ok, monitor H&H, no CP or sob  8/8. 6l At baseline a t home, OOB to chair, feeling a little weak.  8/9. breathing stable per patient, tired and didnt sleep well. urinated well with iv diuresis.    MEDICATIONS:   MEDICATIONS  (STANDING):  acetaminophen     Tablet .. 975 milliGRAM(s) Oral every 8 hours  albuterol    0.083% 2.5 milliGRAM(s) Nebulizer every 6 hours  albuterol    90 MICROgram(s) HFA Inhaler 1 Puff(s) Inhalation every 4 hours  allopurinol 300 milliGRAM(s) Oral daily  apixaban 5 milliGRAM(s) Oral every 12 hours  budesonide 160 MICROgram(s)/formoterol 4.5 MICROgram(s) Inhaler 2 Puff(s) Inhalation two times a day  dextrose 5%. 1000 milliLiter(s) (100 mL/Hr) IV Continuous <Continuous>  dextrose 5%. 1000 milliLiter(s) (50 mL/Hr) IV Continuous <Continuous>  dextrose 50% Injectable 12.5 Gram(s) IV Push once  dextrose 50% Injectable 25 Gram(s) IV Push once  dextrose 50% Injectable 25 Gram(s) IV Push once  diltiazem    milliGRAM(s) Oral daily  glucagon  Injectable 1 milliGRAM(s) IntraMuscular once  insulin lispro (ADMELOG) corrective regimen sliding scale   SubCutaneous Before meals and at bedtime  metoprolol succinate ER 25 milliGRAM(s) Oral daily  polyethylene glycol 3350 17 Gram(s) Oral daily  senna 2 Tablet(s) Oral at bedtime  simvastatin 20 milliGRAM(s) Oral at bedtime  tiotropium 2.5 MICROgram(s) Inhaler 2 Puff(s) Inhalation daily  tranexamic acid IVPB 1000 milliGRAM(s) IV Intermittent once  tranexamic acid IVPB 1000 milliGRAM(s) IV Intermittent once    MEDICATIONS  (PRN):  aluminum hydroxide/magnesium hydroxide/simethicone Suspension 30 milliLiter(s) Oral every 4 hours PRN Dyspepsia  dextrose Oral Gel 15 Gram(s) Oral once PRN Blood Glucose LESS THAN 70 milliGRAM(s)/deciliter  dicyclomine 10 milliGRAM(s) Oral three times a day before meals PRN spasms  HYDROmorphone  Injectable 1 milliGRAM(s) IV Push every 4 hours PRN Severe Pain (7 - 10)  magnesium hydroxide Suspension 30 milliLiter(s) Oral daily PRN Constipation  melatonin 3 milliGRAM(s) Oral at bedtime PRN Insomnia  ondansetron Injectable 4 milliGRAM(s) IV Push every 6 hours PRN Nausea and/or Vomiting  oxyCODONE    IR 5 milliGRAM(s) Oral every 4 hours PRN Moderate Pain (4 - 6)  zolpidem 5 milliGRAM(s) Oral at bedtime PRN Insomnia    HOME MEDICATIONS:   allopurinol 300 mg oral tablet: 1 tab(s) orally once a day (05 Aug 2023 01:19)  Apriso 0.375 g oral capsule, extended release: 4 cap(s) orally once a day (05 Aug 2023 01:19)  Cartia  mg/24 hours oral capsule, extended release: 1 cap(s) orally once a day (05 Aug 2023 01:19)  CoQ10: orally once a day (05 Aug 2023 01:19)  dicyclomine 10 mg oral capsule: 1 cap(s) orally 3 times a day, As Needed (05 Aug 2023 01:19)  Eliquis 5 mg oral tablet: 1 tab(s) orally 2 times a day (05 Aug 2023 01:19)  metFORMIN 500 mg oral tablet: 2 tab(s) orally once a day (in the morning) (05 Aug 2023 01:19)  metFORMIN 500 mg oral tablet: 1 tab(s) orally once (at bedtime) (05 Aug 2023 01:19)  Metoprolol Succinate ER 25 mg oral tablet, extended release: 1 tab(s) orally once a day (05 Aug 2023 01:19)  Pepcid Complete: *** as needed*** (05 Aug 2023 04:34)  Probiotic Formula oral capsule: 1 cap(s) orally 2 times a day (05 Aug 2023 01:19)  Prolia 60 mg/mL subcutaneous solution: subcutaneous every 6 months (05 Aug 2023 01:19)  simvastatin 20 mg oral tablet: 1 tab(s) orally once a day (at bedtime) (05 Aug 2023 01:19)  Trelegy Ellipta 100 mcg-62.5 mcg-25 mcg/inh inhalation powder: 1 puff(s) inhaled once a day (05 Aug 2023 01:19)  Vitamin D3 25 mcg (1000 intl units) oral tablet: 1 tab(s) orally once a day (05 Aug 2023 01:19)  zolpidem 5 mg oral tablet: 1 tab(s) orally once a day (at bedtime), As Needed (05 Aug 2023 01:19)    PHYSICAL EXAM:  T(C): 36.5 (09 Aug 2023 08:01), Max: 37.2 (08 Aug 2023 15:34)  T(F): 97.7 (09 Aug 2023 08:01), Max: 99 (08 Aug 2023 15:34)  HR: 80 (09 Aug 2023 08:01) (80 - 92)  BP: 124/52 (09 Aug 2023 08:01) (107/50 - 132/52)  BP(mean): 68 (08 Aug 2023 18:45) (68 - 71)  RR: 18 (09 Aug 2023 08:01) (16 - 18)  SpO2: 92% (09 Aug 2023 08:01) (91% - 93%)    Parameters below as of 09 Aug 2023 08:01  Patient On (Oxygen Delivery Method): nasal cannula  O2 Flow (L/min): 6    Constitutional: NAD, awake and alert  HEENT: PERR, EOMI, Normal Hearing, MMM  Neck: Soft and supple, No LAD, No JVD  Respiratory: Breath sounds crackles LT LL  Cardiovascular: S1 and S2, regular rate and rhythm, no Murmurs, gallops or rubs  Gastrointestinal: Bowel Sounds present, soft, nontender, nondistended, no guarding, no rebound  Extremities: No peripheral edema  Vascular: 2+ peripheral pulses  Neurological: A/O x 3, no focal deficits  Musculoskeletal: 5/5 strength b/l upper and lower extremities  Skin: No rashes    =======================================    LABS:                       8.2    8.82  )-----------( 273      ( 09 Aug 2023 07:24 )             26.2     08-09    138  |  103  |  11  ----------------------------<  156<H>  3.9   |  30  |  0.72    Ca    9.1      09 Aug 2023 07:24    TPro  x   /  Alb  2.5<L>  /  TBili  x   /  DBili  x   /  AST  x   /  ALT  x   /  AlkPhos  x   08-08    Troponin: 7.48 ng/L    CARDIAC TESTING:    < from: TTE Echo Complete w/o Contrast w/ Doppler (08.05.23 @ 10:29) >   The aortic valve is well visualized, appears moderately calcified. Valve   opening seems to be restricted.   Moderate aortic stenosis is present based on RONALD 1.2 cm2.   Peak and mean transaortic gradients are 39 and 21 mmHg respectively.   The left atrium is moderately dilated.   Estimated left ventricular ejection fraction is 60-65 %.   The left ventricle is normal in size, wall motion and contractility as   seen in limited views.   Mild concentric left ventricular hypertrophy is present.   All visualized extra cardiac structures appears to be normal.   There is calcification of both mitral valve leaflets. The leaflet opening   is normal.   Mild mitral annular calcification is present.   Mild (1+) mitral regurgitation is present.   EA reversal of the mitral inflow consistent with reduced compliance of   the   left ventricle.   No evidence of pericardial effusion.   No evidence of pleural effusion.   Normal appearing pulmonic valve structure and function.   The right atrium appears mildly dilated.   The right ventricle is mildly dilated.   Normal appearing right ventricle function.   Normal appearing tricuspid valve structure.   Mild to Moderate Tricuspid regurgitation is present.   Moderate pulmonary hypertension.    < end of copied text >      RADIOLOGY & ADDITIONAL STUDIES:    < from: Xray Hip w/ Pelvis 1 View, Left (08.05.23 @ 16:51) >  Status post left hip arthroplasty.    < from: Xray Femur 2 Views, Left (08.04.23 @ 23:48) >  Subcapital fracture.  The patient subsequently underwent ORIF of the left hip.    < from: CT Chest No Cont (08.04.23 @ 23:24) >  Acute left femoral neck fracture.    Mild interlobular septal thickening may reflect mild pulmonary edema.   Scattered small foci of tree-in-bud nodularity in the rightupper and   middle lobes likely reflecting infectious/inflammatory small airways   disease or impacted distal airways.    < end of copied text >

## 2023-08-09 NOTE — CONSULT NOTE ADULT - SUBJECTIVE AND OBJECTIVE BOX
Patient is a 82y old  Female who presents with a chief complaint of s/p fall    HPI:  83 y/o female with h/o HTN, HLD, DM, Paroxysmal Afib, ANU, COPD on 6L O2 at home was admitted on 8/5 for left hip pain, weakness after fall. Pt states she was walking in her house, tripped and fell onto L Hip. Pt noticed immediate pain and inability to ambulate s/p MF. No other orthopedic injuries or complaints. No HS/LOC. No hx of orthopedic surgeries in the past. Recently seen in ED on 6/22/23 for L Proximal humerus fx s/p MF, treated non-operatively. On 8/7 she deve;pe[d fever tp 102F an blood cultures were collected. On 8/9 she was reported with bacteremia. Concern about an infectious process was raised.     PMH: as above  PSH: as above  Meds: per reconciliation sheet, noted below  MEDICATIONS  (STANDING):  acetaminophen     Tablet .. 975 milliGRAM(s) Oral every 8 hours  albuterol    0.083% 2.5 milliGRAM(s) Nebulizer every 6 hours  albuterol    90 MICROgram(s) HFA Inhaler 1 Puff(s) Inhalation every 4 hours  allopurinol 300 milliGRAM(s) Oral daily  apixaban 5 milliGRAM(s) Oral every 12 hours  budesonide 160 MICROgram(s)/formoterol 4.5 MICROgram(s) Inhaler 2 Puff(s) Inhalation two times a day  dextrose 5%. 1000 milliLiter(s) (100 mL/Hr) IV Continuous <Continuous>  dextrose 5%. 1000 milliLiter(s) (50 mL/Hr) IV Continuous <Continuous>  dextrose 50% Injectable 25 Gram(s) IV Push once  dextrose 50% Injectable 12.5 Gram(s) IV Push once  dextrose 50% Injectable 25 Gram(s) IV Push once  diltiazem    milliGRAM(s) Oral daily  glucagon  Injectable 1 milliGRAM(s) IntraMuscular once  insulin lispro (ADMELOG) corrective regimen sliding scale   SubCutaneous Before meals and at bedtime  metoprolol succinate ER 25 milliGRAM(s) Oral daily  polyethylene glycol 3350 17 Gram(s) Oral daily  senna 2 Tablet(s) Oral at bedtime  simvastatin 20 milliGRAM(s) Oral at bedtime  tiotropium 2.5 MICROgram(s) Inhaler 2 Puff(s) Inhalation daily  tranexamic acid IVPB 1000 milliGRAM(s) IV Intermittent once  tranexamic acid IVPB 1000 milliGRAM(s) IV Intermittent once    MEDICATIONS  (PRN):  aluminum hydroxide/magnesium hydroxide/simethicone Suspension 30 milliLiter(s) Oral every 4 hours PRN Dyspepsia  dextrose Oral Gel 15 Gram(s) Oral once PRN Blood Glucose LESS THAN 70 milliGRAM(s)/deciliter  dicyclomine 10 milliGRAM(s) Oral three times a day before meals PRN spasms  HYDROmorphone  Injectable 1 milliGRAM(s) IV Push every 4 hours PRN Severe Pain (7 - 10)  magnesium hydroxide Suspension 30 milliLiter(s) Oral daily PRN Constipation  melatonin 3 milliGRAM(s) Oral at bedtime PRN Insomnia  ondansetron Injectable 4 milliGRAM(s) IV Push every 6 hours PRN Nausea and/or Vomiting  oxyCODONE    IR 5 milliGRAM(s) Oral every 4 hours PRN Moderate Pain (4 - 6)  zolpidem 5 milliGRAM(s) Oral at bedtime PRN Insomnia    Allergies    aspirin (Stomach Upset)    Intolerances      Social: no smoking, no alcohol, no illegal drugs; no recent travel, no exposure to TB  FAMILY HISTORY:  FH: HTN (hypertension) mother, father  FH: stroke mother  FH: CHF (congestive heart failure) father  FH: cancer brother    ROS: the patient denies HA, no seizures, no dizziness, no sore throat, no nasal congestion, no blurry vision, no CP, no palpitations, no SOB, no cough, no abdominal pain, no diarrhea, no N/V, no dysuria, has left hip pain, no claudication, no rash, no joint aches, no rectal pain or bleeding, no night sweats  All other systems reviewed and are negative    Vital Signs Last 24 Hrs  T(C): 36.5 (09 Aug 2023 08:01), Max: 37.2 (08 Aug 2023 15:34)  T(F): 97.7 (09 Aug 2023 08:01), Max: 99 (08 Aug 2023 15:34)  HR: 80 (09 Aug 2023 08:01) (80 - 92)  BP: 124/52 (09 Aug 2023 08:01) (107/50 - 132/52)  BP(mean): 68 (08 Aug 2023 18:45) (68 - 71)  RR: 18 (09 Aug 2023 08:01) (16 - 18)  SpO2: 92% (09 Aug 2023 08:01) (91% - 93%)    Parameters below as of 09 Aug 2023 08:01  Patient On (Oxygen Delivery Method): nasal cannula  O2 Flow (L/min): 6    PE:    Constitutional:  No acute distress  HEENT: NC/AT, EOMI, PERRLA, conjunctivae clear; ears and nose atraumatic; pharynx benign  Neck: supple; thyroid not palpable  Back: no tenderness  Respiratory: respiratory effort normal; clear to auscultation  Cardiovascular: S1S2 regular, no murmurs  Abdomen: soft, not tender, not distended, positive BS; no liver or spleen organomegaly  Genitourinary: no suprapubic tenderness  Lymphatic: no LN palpable  Musculoskeletal: no muscle tenderness, no joint swelling or tenderness  Left hip s/p surgery.  Extremities: no pedal edema  Neurological/ Psychiatric: AxOx3, judgement and insight normal; moving all extremities  Skin: no rashes; no palpable lesions    Labs: all available labs reviewed                        8.2    8.82  )-----------( 273      ( 09 Aug 2023 07:24 )             26.2     08-09    138  |  103  |  11  ----------------------------<  156<H>  3.9   |  30  |  0.72    Ca    9.1      09 Aug 2023 07:24    TPro  x   /  Alb  2.5<L>  /  TBili  x   /  DBili  x   /  AST  x   /  ALT  x   /  AlkPhos  x   08-08     LIVER FUNCTIONS - ( 08 Aug 2023 07:23 )  Alb: 2.5 g/dL / Pro: x     / ALK PHOS: x     / ALT: x     / AST: x     / GGT: x           Culture - Blood (collected 07 Aug 2023 05:17)  Source: .Blood None  Preliminary Report (09 Aug 2023 09:01):    No growth at 48 Hours    Culture - Blood (collected 07 Aug 2023 05:16)  Source: .Blood None  Gram Stain (08 Aug 2023 05:12):    Growth in aerobic bottle: Gram Positive Cocci in Clusters  Final Report (08 Aug 2023 21:59):    Growth in aerobic bottle: Staphylococcus epidermidis    Coagulase Negative Staphylococci isolated from a single blood culture set    may represent contamination.    Contact the Microbiology Department at [phone] if susceptibility    testing is clinically indicated.    Direct identification is available within approximately 3-5    hours either by Blood Panel Multiplexed PCR or Direct    MALDI-TOF. Details: [email or web address]  Organism: Blood Culture PCR (08 Aug 2023 21:59)  Organism: Blood Culture PCR (08 Aug 2023 21:59)      Method Type: PCR      -  Staphylococcus epidermidis, Methicillin resistant: Detec    Radiology: all available radiological tests reviewed    Advanced directives addressed: full resuscitation Patient is a 82y old  Female who presents with a chief complaint of s/p fall    HPI:  81 y/o female with h/o HTN, HLD, DM, Paroxysmal Afib, ANU, COPD on 6L O2 at home was admitted on 8/5 for left hip pain, weakness after fall. Pt states she was walking in her house, tripped and fell onto L Hip. Pt noticed immediate pain and inability to ambulate s/p MF. No other orthopedic injuries or complaints. No HS/LOC. No hx of orthopedic surgeries in the past. Recently seen in ED on 6/22/23 for L Proximal humerus fx s/p MF, treated non-operatively. On 8/7 she deve;pe[d fever tp 102F an blood cultures were collected. On 8/9 she was reported with bacteremia. Concern about an infectious process was raised.     PMH: as above  PSH: as above  Meds: per reconciliation sheet, noted below  MEDICATIONS  (STANDING):  acetaminophen     Tablet .. 975 milliGRAM(s) Oral every 8 hours  albuterol    0.083% 2.5 milliGRAM(s) Nebulizer every 6 hours  albuterol    90 MICROgram(s) HFA Inhaler 1 Puff(s) Inhalation every 4 hours  allopurinol 300 milliGRAM(s) Oral daily  apixaban 5 milliGRAM(s) Oral every 12 hours  budesonide 160 MICROgram(s)/formoterol 4.5 MICROgram(s) Inhaler 2 Puff(s) Inhalation two times a day  dextrose 5%. 1000 milliLiter(s) (100 mL/Hr) IV Continuous <Continuous>  dextrose 5%. 1000 milliLiter(s) (50 mL/Hr) IV Continuous <Continuous>  dextrose 50% Injectable 25 Gram(s) IV Push once  dextrose 50% Injectable 12.5 Gram(s) IV Push once  dextrose 50% Injectable 25 Gram(s) IV Push once  diltiazem    milliGRAM(s) Oral daily  glucagon  Injectable 1 milliGRAM(s) IntraMuscular once  insulin lispro (ADMELOG) corrective regimen sliding scale   SubCutaneous Before meals and at bedtime  metoprolol succinate ER 25 milliGRAM(s) Oral daily  polyethylene glycol 3350 17 Gram(s) Oral daily  senna 2 Tablet(s) Oral at bedtime  simvastatin 20 milliGRAM(s) Oral at bedtime  tiotropium 2.5 MICROgram(s) Inhaler 2 Puff(s) Inhalation daily  tranexamic acid IVPB 1000 milliGRAM(s) IV Intermittent once  tranexamic acid IVPB 1000 milliGRAM(s) IV Intermittent once    MEDICATIONS  (PRN):  aluminum hydroxide/magnesium hydroxide/simethicone Suspension 30 milliLiter(s) Oral every 4 hours PRN Dyspepsia  dextrose Oral Gel 15 Gram(s) Oral once PRN Blood Glucose LESS THAN 70 milliGRAM(s)/deciliter  dicyclomine 10 milliGRAM(s) Oral three times a day before meals PRN spasms  HYDROmorphone  Injectable 1 milliGRAM(s) IV Push every 4 hours PRN Severe Pain (7 - 10)  magnesium hydroxide Suspension 30 milliLiter(s) Oral daily PRN Constipation  melatonin 3 milliGRAM(s) Oral at bedtime PRN Insomnia  ondansetron Injectable 4 milliGRAM(s) IV Push every 6 hours PRN Nausea and/or Vomiting  oxyCODONE    IR 5 milliGRAM(s) Oral every 4 hours PRN Moderate Pain (4 - 6)  zolpidem 5 milliGRAM(s) Oral at bedtime PRN Insomnia    Allergies    aspirin (Stomach Upset)    Intolerances      Social: no smoking, no alcohol, no illegal drugs; no recent travel, no exposure to TB  FAMILY HISTORY:  FH: HTN (hypertension) mother, father  FH: stroke mother  FH: CHF (congestive heart failure) father  FH: cancer brother    ROS: the patient denies HA, no seizures, no dizziness, no sore throat, no nasal congestion, no blurry vision, no CP, no palpitations, no SOB, no cough, no abdominal pain, no diarrhea, no N/V, no dysuria, has left hip pain, no claudication, no rash, no joint aches, no rectal pain or bleeding, no night sweats  All other systems reviewed and are negative    Vital Signs Last 24 Hrs  T(C): 36.5 (09 Aug 2023 08:01), Max: 37.2 (08 Aug 2023 15:34)  T(F): 97.7 (09 Aug 2023 08:01), Max: 99 (08 Aug 2023 15:34)  HR: 80 (09 Aug 2023 08:01) (80 - 92)  BP: 124/52 (09 Aug 2023 08:01) (107/50 - 132/52)  BP(mean): 68 (08 Aug 2023 18:45) (68 - 71)  RR: 18 (09 Aug 2023 08:01) (16 - 18)  SpO2: 92% (09 Aug 2023 08:01) (91% - 93%)    Parameters below as of 09 Aug 2023 08:01  Patient On (Oxygen Delivery Method): nasal cannula  O2 Flow (L/min): 6    PE:    Constitutional:  No acute distress  HEENT: NC/AT, EOMI, PERRLA, conjunctivae clear; ears and nose atraumatic; pharynx benign  Neck: supple; thyroid not palpable  Back: no tenderness  Respiratory: respiratory effort normal; clear to auscultation  Cardiovascular: S1S2 regular, no murmurs  Abdomen: soft, not tender, not distended, positive BS; no liver or spleen organomegaly  Genitourinary: no suprapubic tenderness  Lymphatic: no LN palpable  Musculoskeletal: no muscle tenderness, no joint swelling or tenderness  Left hip s/p surgery - wound with no erythema, no discharge   Extremities: no pedal edema  Neurological/ Psychiatric: AxOx3, judgement and insight normal; moving all extremities  Skin: no rashes; no palpable lesions    Labs: all available labs reviewed                        8.2    8.82  )-----------( 273      ( 09 Aug 2023 07:24 )             26.2     08-09    138  |  103  |  11  ----------------------------<  156<H>  3.9   |  30  |  0.72    Ca    9.1      09 Aug 2023 07:24    TPro  x   /  Alb  2.5<L>  /  TBili  x   /  DBili  x   /  AST  x   /  ALT  x   /  AlkPhos  x   08-08     LIVER FUNCTIONS - ( 08 Aug 2023 07:23 )  Alb: 2.5 g/dL / Pro: x     / ALK PHOS: x     / ALT: x     / AST: x     / GGT: x           Culture - Blood (collected 07 Aug 2023 05:17)  Source: .Blood None  Preliminary Report (09 Aug 2023 09:01):    No growth at 48 Hours    Culture - Blood (collected 07 Aug 2023 05:16)  Source: .Blood None  Gram Stain (08 Aug 2023 05:12):    Growth in aerobic bottle: Gram Positive Cocci in Clusters  Final Report (08 Aug 2023 21:59):    Growth in aerobic bottle: Staphylococcus epidermidis    Coagulase Negative Staphylococci isolated from a single blood culture set    may represent contamination.    Contact the Microbiology Department at [phone] if susceptibility    testing is clinically indicated.    Direct identification is available within approximately 3-5    hours either by Blood Panel Multiplexed PCR or Direct    MALDI-TOF. Details: [email or web address]  Organism: Blood Culture PCR (08 Aug 2023 21:59)  Organism: Blood Culture PCR (08 Aug 2023 21:59)      Method Type: PCR      -  Staphylococcus epidermidis, Methicillin resistant: Detec    Radiology: all available radiological tests reviewed    Advanced directives addressed: full resuscitation

## 2023-08-10 ENCOUNTER — TRANSCRIPTION ENCOUNTER (OUTPATIENT)
Age: 83
End: 2023-08-10

## 2023-08-10 VITALS
OXYGEN SATURATION: 95 % | SYSTOLIC BLOOD PRESSURE: 114 MMHG | RESPIRATION RATE: 16 BRPM | HEART RATE: 69 BPM | TEMPERATURE: 98 F | DIASTOLIC BLOOD PRESSURE: 53 MMHG

## 2023-08-10 LAB
ANION GAP SERPL CALC-SCNC: 3 MMOL/L — LOW (ref 5–17)
BASOPHILS # BLD AUTO: 0.03 K/UL — SIGNIFICANT CHANGE UP (ref 0–0.2)
BASOPHILS NFR BLD AUTO: 0.4 % — SIGNIFICANT CHANGE UP (ref 0–2)
BUN SERPL-MCNC: 10 MG/DL — SIGNIFICANT CHANGE UP (ref 7–23)
CALCIUM SERPL-MCNC: 9.1 MG/DL — SIGNIFICANT CHANGE UP (ref 8.5–10.1)
CHLORIDE SERPL-SCNC: 101 MMOL/L — SIGNIFICANT CHANGE UP (ref 96–108)
CO2 SERPL-SCNC: 33 MMOL/L — HIGH (ref 22–31)
CREAT SERPL-MCNC: 0.73 MG/DL — SIGNIFICANT CHANGE UP (ref 0.5–1.3)
EGFR: 81 ML/MIN/1.73M2 — SIGNIFICANT CHANGE UP
EOSINOPHIL # BLD AUTO: 0.17 K/UL — SIGNIFICANT CHANGE UP (ref 0–0.5)
EOSINOPHIL NFR BLD AUTO: 2.3 % — SIGNIFICANT CHANGE UP (ref 0–6)
GLUCOSE BLDC GLUCOMTR-MCNC: 140 MG/DL — HIGH (ref 70–99)
GLUCOSE BLDC GLUCOMTR-MCNC: 156 MG/DL — HIGH (ref 70–99)
GLUCOSE SERPL-MCNC: 135 MG/DL — HIGH (ref 70–99)
HCT VFR BLD CALC: 26.7 % — LOW (ref 34.5–45)
HGB BLD-MCNC: 8.4 G/DL — LOW (ref 11.5–15.5)
IMM GRANULOCYTES NFR BLD AUTO: 0.5 % — SIGNIFICANT CHANGE UP (ref 0–0.9)
LYMPHOCYTES # BLD AUTO: 1.19 K/UL — SIGNIFICANT CHANGE UP (ref 1–3.3)
LYMPHOCYTES # BLD AUTO: 16.3 % — SIGNIFICANT CHANGE UP (ref 13–44)
MCHC RBC-ENTMCNC: 25.4 PG — LOW (ref 27–34)
MCHC RBC-ENTMCNC: 31.5 GM/DL — LOW (ref 32–36)
MCV RBC AUTO: 80.7 FL — SIGNIFICANT CHANGE UP (ref 80–100)
MONOCYTES # BLD AUTO: 0.63 K/UL — SIGNIFICANT CHANGE UP (ref 0–0.9)
MONOCYTES NFR BLD AUTO: 8.6 % — SIGNIFICANT CHANGE UP (ref 2–14)
NEUTROPHILS # BLD AUTO: 5.26 K/UL — SIGNIFICANT CHANGE UP (ref 1.8–7.4)
NEUTROPHILS NFR BLD AUTO: 71.9 % — SIGNIFICANT CHANGE UP (ref 43–77)
PLATELET # BLD AUTO: 287 K/UL — SIGNIFICANT CHANGE UP (ref 150–400)
POTASSIUM SERPL-MCNC: 3.8 MMOL/L — SIGNIFICANT CHANGE UP (ref 3.5–5.3)
POTASSIUM SERPL-SCNC: 3.8 MMOL/L — SIGNIFICANT CHANGE UP (ref 3.5–5.3)
RBC # BLD: 3.31 M/UL — LOW (ref 3.8–5.2)
RBC # FLD: 22.8 % — HIGH (ref 10.3–14.5)
SODIUM SERPL-SCNC: 137 MMOL/L — SIGNIFICANT CHANGE UP (ref 135–145)
WBC # BLD: 7.32 K/UL — SIGNIFICANT CHANGE UP (ref 3.8–10.5)
WBC # FLD AUTO: 7.32 K/UL — SIGNIFICANT CHANGE UP (ref 3.8–10.5)

## 2023-08-10 PROCEDURE — 99239 HOSP IP/OBS DSCHRG MGMT >30: CPT

## 2023-08-10 RX ORDER — FUROSEMIDE 40 MG
20 TABLET ORAL
Refills: 0 | Status: DISCONTINUED | OUTPATIENT
Start: 2023-08-11 | End: 2023-08-10

## 2023-08-10 RX ORDER — FUROSEMIDE 40 MG
1 TABLET ORAL
Qty: 0 | Refills: 0 | DISCHARGE
Start: 2023-08-10

## 2023-08-10 RX ADMIN — Medication 975 MILLIGRAM(S): at 13:16

## 2023-08-10 RX ADMIN — OXYCODONE HYDROCHLORIDE 5 MILLIGRAM(S): 5 TABLET ORAL at 11:49

## 2023-08-10 RX ADMIN — Medication 975 MILLIGRAM(S): at 05:26

## 2023-08-10 RX ADMIN — Medication 1: at 12:30

## 2023-08-10 RX ADMIN — POLYETHYLENE GLYCOL 3350 17 GRAM(S): 17 POWDER, FOR SOLUTION ORAL at 09:39

## 2023-08-10 RX ADMIN — Medication 300 MILLIGRAM(S): at 09:39

## 2023-08-10 RX ADMIN — Medication 975 MILLIGRAM(S): at 05:53

## 2023-08-10 RX ADMIN — Medication 25 MILLIGRAM(S): at 09:39

## 2023-08-10 RX ADMIN — APIXABAN 5 MILLIGRAM(S): 2.5 TABLET, FILM COATED ORAL at 09:39

## 2023-08-10 RX ADMIN — OXYCODONE HYDROCHLORIDE 5 MILLIGRAM(S): 5 TABLET ORAL at 10:59

## 2023-08-10 RX ADMIN — Medication 240 MILLIGRAM(S): at 09:39

## 2023-08-10 RX ADMIN — TIOTROPIUM BROMIDE 2 PUFF(S): 18 CAPSULE ORAL; RESPIRATORY (INHALATION) at 09:04

## 2023-08-10 RX ADMIN — BUDESONIDE AND FORMOTEROL FUMARATE DIHYDRATE 2 PUFF(S): 160; 4.5 AEROSOL RESPIRATORY (INHALATION) at 09:04

## 2023-08-10 RX ADMIN — OXYCODONE HYDROCHLORIDE 5 MILLIGRAM(S): 5 TABLET ORAL at 01:54

## 2023-08-10 RX ADMIN — ALBUTEROL 2.5 MILLIGRAM(S): 90 AEROSOL, METERED ORAL at 09:04

## 2023-08-10 RX ADMIN — OXYCODONE HYDROCHLORIDE 5 MILLIGRAM(S): 5 TABLET ORAL at 01:25

## 2023-08-10 NOTE — PROGRESS NOTE ADULT - NS ATTEND AMEND GEN_ALL_CORE FT
Patient seen and examined, agree with plan above.

## 2023-08-10 NOTE — PROGRESS NOTE ADULT - ASSESSMENT
81 y/o female w/PMHx of HTN, HLD, DM, Paroxysmal Afib, ANU, COPD on 6L O2 at home, presents after fall c/o hip pain. Pt states she was walking in her house, tripped and fell onto L Hip. Pt noticed immediate pain and inability to ambulate s/p MF. No other orthopedic injuries or complaints. No HS/LOC. No hx of orthopedic surgeries in the past. Recently seen in ED on 6/22/23 for L Proximal humerus fx s/p MF, treated non-operatively. Hx of severe COPD on 6LNC at Home and AFib on Eliquis (Last Dose 6pm 8/4/23).     #Acute on chronic hypoxemic respiratory failure. Multifactorial - CHF and Anemia. s/p IV lasix, transition to 20mg PO QOD.  #Left femoral neck fracture. S/P ORIF on 8/5.  #Moderate aortic stenosis. Requires no treatment.  #PAF.  Maintaining RSR. Continue metoprolol and Eliquis. Monitor H&H.  #HTN. Continue metoprolol, diltiazem.  #HLD. Continue statin.  #COPD. Continue oxygen, inhalers. BL 02 6L  #IDDMT 2. Insulin coverage        Case d/w Dr. Booker, will sign off, call with questions  FU on 9/7 21PM in office. 81 y/o female w/PMHx of HTN, HLD, DM, Paroxysmal Afib, ANU, COPD on 6L O2 at home, presents after fall c/o hip pain. Pt states she was walking in her house, tripped and fell onto L Hip. Pt noticed immediate pain and inability to ambulate s/p MF. No other orthopedic injuries or complaints. No HS/LOC. No hx of orthopedic surgeries in the past. Recently seen in ED on 6/22/23 for L Proximal humerus fx s/p MF, treated non-operatively. Hx of severe COPD on 6LNC at Home and AFib on Eliquis (Last Dose 6pm 8/4/23).     #Acute on chronic hypoxemic respiratory failure. Multifactorial - CHF and Anemia. s/p IV lasix, transition to 20mg PO QOD.  #Left femoral neck fracture. S/P ORIF on 8/5.  #Moderate aortic stenosis. Requires no treatment.  #PAF.  Maintaining RSR. Continue metoprolol and Eliquis. Monitor H&H.  #HTN. Continue metoprolol, diltiazem.  #HLD. Continue statin.  #COPD. Continue oxygen, inhalers. BL 02 6L  #IDDMT 2. Insulin coverage        Case d/w Dr. Way, will sign off, call with questions  FU on 9/7 21PM in office.

## 2023-08-10 NOTE — PROGRESS NOTE ADULT - SUBJECTIVE AND OBJECTIVE BOX
CHIEF COMPLAINT/INTERVAL HISTORY:    Patient is a 82y old  Female who presents with a chief complaint of s/p fall with left hip fracture (05 Aug 2023 11:25)      HPI:  Pt is a 83 y/o female w/PMHx of HTN, HLD, DM, Paroxysmal Afib, ANU, COPD on 6L O2 at home, presents after fall c/o hip pain. Pt states she was walking in her house, tripped and fell onto L Hip. Pt noticed immediate pain and inability to ambulate s/p MF. No other orthopedic injuries or complaints. No HS/LOC. No hx of orthopedic surgeries in the past. Recently seen in ED on 6/22/23 for L Proximal humerus fx s/p MF, treated non-operatively. Hx of severe COPD on 6LNC at Home and AFib on Eliquis (Last Dose 6pm 8/4/23). (05 Aug 2023 07:44)      POD#4  had Tm 102 blood cx done now aerobic + for Staph epidermidis   Bl cx repeated yesterday; for anemia she was transfused yesterday; there is suspicion for HF based on cxr;  CT was done because read of PNA- not present    8/11: Seen and evaluated. No acute complaints. VSS noted.   Labs noted     Vital Signs Last 24 Hrs  T(C): 36.9 (10 Aug 2023 07:32), Max: 36.9 (10 Aug 2023 00:20)  T(F): 98.4 (10 Aug 2023 07:32), Max: 98.4 (10 Aug 2023 00:20)  HR: 69 (10 Aug 2023 07:32) (69 - 89)  BP: 114/53 (10 Aug 2023 07:32) (114/53 - 128/46)  RR: 16 (10 Aug 2023 07:32) (16 - 18)  SpO2: 95% (10 Aug 2023 07:32) (92% - 99%)    Parameters below as of 10 Aug 2023 07:32  Patient On (Oxygen Delivery Method): nasal cannula  O2 Flow (L/min): 6            MEDICATIONS  (STANDING):  acetaminophen     Tablet .. 975 milliGRAM(s) Oral every 8 hours  albuterol    0.083% 2.5 milliGRAM(s) Nebulizer every 6 hours  albuterol    90 MICROgram(s) HFA Inhaler 1 Puff(s) Inhalation every 4 hours  allopurinol 300 milliGRAM(s) Oral daily  apixaban 5 milliGRAM(s) Oral every 12 hours  budesonide 160 MICROgram(s)/formoterol 4.5 MICROgram(s) Inhaler 2 Puff(s) Inhalation two times a day  dextrose 5%. 1000 milliLiter(s) (50 mL/Hr) IV Continuous <Continuous>  dextrose 5%. 1000 milliLiter(s) (100 mL/Hr) IV Continuous <Continuous>  dextrose 50% Injectable 25 Gram(s) IV Push once  dextrose 50% Injectable 12.5 Gram(s) IV Push once  dextrose 50% Injectable 25 Gram(s) IV Push once  diltiazem    milliGRAM(s) Oral daily  glucagon  Injectable 1 milliGRAM(s) IntraMuscular once  insulin lispro (ADMELOG) corrective regimen sliding scale   SubCutaneous Before meals and at bedtime  metoprolol succinate ER 25 milliGRAM(s) Oral daily  polyethylene glycol 3350 17 Gram(s) Oral daily  senna 2 Tablet(s) Oral at bedtime  simvastatin 20 milliGRAM(s) Oral at bedtime  tiotropium 2.5 MICROgram(s) Inhaler 2 Puff(s) Inhalation daily  tranexamic acid IVPB 1000 milliGRAM(s) IV Intermittent once  tranexamic acid IVPB 1000 milliGRAM(s) IV Intermittent once    MEDICATIONS  (PRN):  aluminum hydroxide/magnesium hydroxide/simethicone Suspension 30 milliLiter(s) Oral every 4 hours PRN Dyspepsia  dextrose Oral Gel 15 Gram(s) Oral once PRN Blood Glucose LESS THAN 70 milliGRAM(s)/deciliter  dicyclomine 10 milliGRAM(s) Oral three times a day before meals PRN spasms  HYDROmorphone  Injectable 1 milliGRAM(s) IV Push every 4 hours PRN Severe Pain (7 - 10)  magnesium hydroxide Suspension 30 milliLiter(s) Oral daily PRN Constipation  melatonin 3 milliGRAM(s) Oral at bedtime PRN Insomnia  ondansetron Injectable 4 milliGRAM(s) IV Push every 6 hours PRN Nausea and/or Vomiting  oxyCODONE    IR 5 milliGRAM(s) Oral every 4 hours PRN Moderate Pain (4 - 6)  zolpidem 5 milliGRAM(s) Oral at bedtime PRN Insomnia          PHYSICAL EXAM:    GENERAL: NAD, well-groomed, well-developed  NERVOUS SYSTEM:  Alert & Oriented X3, Motor Strength 5/5 B/L upper and lower extremities; DTRs 2+ intact and symmetric  CHEST/LUNG: Clear to auscultation bilaterally; No rales, rhonchi, wheezing, or rubs  HEART: Regular rate and rhythm; No murmurs, rubs, or gallops  ABDOMEN: Soft, Nontender, Nondistended; Bowel sounds present  EXTREMITIES:  2+ Peripheral Pulses, No clubbing, cyanosis, or edema    LABS:                                     8.2    8.82  )-----------( 273      ( 09 Aug 2023 07:24 )             26.2         TTE Echo Complete w/o Contrast w/ Doppler (08.05.23 @ 10:29) >     The aortic valve is well visualized, appears moderately calcified. Valve   opening seems to be restricted.   Moderate aortic stenosis is present based on RONALD 1.2 cm2.   Peak and mean transaortic gradients are 39 and 21 mmHg respectively.   The left atrium is moderately dilated.   Estimated left ventricular ejection fraction is 60-65 %.   The left ventricle is normal in size, wall motion and contractility as   seen in limited views.   Mild concentric left ventricular hypertrophy is present.   All visualized extra cardiac structures appears to be normal.   There is calcification of both mitral valve leaflets. The leaflet opening   is normal.   Mild mitral annular calcification is present.   Mild (1+) mitral regurgitation is present.   EA reversal of the mitral inflow consistent with reduced compliance of   the   left ventricle.   No evidence of pericardial effusion.   No evidence of pleural effusion.   Normal appearing pulmonic valve structure and function.   The right atrium appears mildly dilated.   The right ventricle is mildly dilated.   Normal appearing right ventricle function.   Normal appearing tricuspid valve structure.   Mild to Moderate Tricuspid regurgitation is present.   Moderate pulmonary hypertension.    12 Lead ECG (08.05.23 @ 11:37) >    Diagnosis Line Normal sinus rhythm  Right bundle branch block  Abnormal ECG        CAPILLARY BLOOD GLUCOSE      POCT Blood Glucose.: 213 mg/dL (08 Aug 2023 21:19)  POCT Blood Glucose.: 169 mg/dL (08 Aug 2023 17:02)  POCT Blood Glucose.: 193 mg/dL (08 Aug 2023 12:40)      * Left hip fx  POD#5  aerobic bottle GPC, probably contaminant   repeat bl cx: negative     * Abnormal CXR/ chf- diastolic; moderate AS  Lasix     * ABLA  s/p prbc; needs to remain hospitalize to ensure stable HH has severe COPD with 6L O2 at home    #T2DM  - ISS  - Monitor glucose, A1C= 6.4 well ctr at home    #HTN, HLD, DM, Paroxysmal Afib, severe COPD on 6L NC at home, moderate AS  - Continue with home meds- c/w Eliquis

## 2023-08-10 NOTE — DISCHARGE NOTE NURSING/CASE MANAGEMENT/SOCIAL WORK - PATIENT PORTAL LINK FT
You can access the FollowMyHealth Patient Portal offered by [facility] by registering at the following website: [email or web address]. By joining [employer]s FollowMyHealth portal, you will also be able to view your health information using other applications (apps) compatible with our system.

## 2023-08-10 NOTE — PROGRESS NOTE ADULT - SUBJECTIVE AND OBJECTIVE BOX
Subjective:    pat better, no new complaint.    Home Medications:  allopurinol 300 mg oral tablet: 1 tab(s) orally once a day (05 Aug 2023 01:19)  Apriso 0.375 g oral capsule, extended release: 4 cap(s) orally once a day (05 Aug 2023 01:19)  Cartia  mg/24 hours oral capsule, extended release: 1 cap(s) orally once a day (05 Aug 2023 01:19)  CoQ10: orally once a day (05 Aug 2023 01:19)  dicyclomine 10 mg oral capsule: 1 cap(s) orally 3 times a day, As Needed (05 Aug 2023 01:19)  Eliquis 5 mg oral tablet: 1 tab(s) orally 2 times a day (05 Aug 2023 01:19)  furosemide 20 mg oral tablet: 1 tab(s) orally every other day (10 Aug 2023 12:42)  metFORMIN 500 mg oral tablet: 2 tab(s) orally once a day (in the morning) (05 Aug 2023 01:19)  metFORMIN 500 mg oral tablet: 1 tab(s) orally once (at bedtime) (05 Aug 2023 01:19)  Metoprolol Succinate ER 25 mg oral tablet, extended release: 1 tab(s) orally once a day (05 Aug 2023 01:19)  Pepcid Complete: *** as needed*** (05 Aug 2023 04:34)  Probiotic Formula oral capsule: 1 cap(s) orally 2 times a day (05 Aug 2023 01:19)  Prolia 60 mg/mL subcutaneous solution: subcutaneous every 6 months (05 Aug 2023 01:19)  simvastatin 20 mg oral tablet: 1 tab(s) orally once a day (at bedtime) (05 Aug 2023 01:19)  Trelegy Ellipta 100 mcg-62.5 mcg-25 mcg/inh inhalation powder: 1 puff(s) inhaled once a day (05 Aug 2023 01:19)  Vitamin D3 25 mcg (1000 intl units) oral tablet: 1 tab(s) orally once a day (05 Aug 2023 01:19)  zolpidem 5 mg oral tablet: 1 tab(s) orally once a day (at bedtime), As Needed (05 Aug 2023 01:19)    MEDICATIONS  (STANDING):  acetaminophen     Tablet .. 975 milliGRAM(s) Oral every 8 hours  albuterol    0.083% 2.5 milliGRAM(s) Nebulizer every 6 hours  albuterol    90 MICROgram(s) HFA Inhaler 1 Puff(s) Inhalation every 4 hours  allopurinol 300 milliGRAM(s) Oral daily  apixaban 5 milliGRAM(s) Oral every 12 hours  budesonide 160 MICROgram(s)/formoterol 4.5 MICROgram(s) Inhaler 2 Puff(s) Inhalation two times a day  dextrose 5%. 1000 milliLiter(s) (50 mL/Hr) IV Continuous <Continuous>  dextrose 5%. 1000 milliLiter(s) (100 mL/Hr) IV Continuous <Continuous>  dextrose 50% Injectable 25 Gram(s) IV Push once  dextrose 50% Injectable 12.5 Gram(s) IV Push once  dextrose 50% Injectable 25 Gram(s) IV Push once  diltiazem    milliGRAM(s) Oral daily  glucagon  Injectable 1 milliGRAM(s) IntraMuscular once  insulin lispro (ADMELOG) corrective regimen sliding scale   SubCutaneous Before meals and at bedtime  metoprolol succinate ER 25 milliGRAM(s) Oral daily  polyethylene glycol 3350 17 Gram(s) Oral daily  senna 2 Tablet(s) Oral at bedtime  simvastatin 20 milliGRAM(s) Oral at bedtime  tiotropium 2.5 MICROgram(s) Inhaler 2 Puff(s) Inhalation daily  tranexamic acid IVPB 1000 milliGRAM(s) IV Intermittent once  tranexamic acid IVPB 1000 milliGRAM(s) IV Intermittent once    MEDICATIONS  (PRN):  aluminum hydroxide/magnesium hydroxide/simethicone Suspension 30 milliLiter(s) Oral every 4 hours PRN Dyspepsia  dextrose Oral Gel 15 Gram(s) Oral once PRN Blood Glucose LESS THAN 70 milliGRAM(s)/deciliter  dicyclomine 10 milliGRAM(s) Oral three times a day before meals PRN spasms  HYDROmorphone  Injectable 1 milliGRAM(s) IV Push every 4 hours PRN Severe Pain (7 - 10)  magnesium hydroxide Suspension 30 milliLiter(s) Oral daily PRN Constipation  melatonin 3 milliGRAM(s) Oral at bedtime PRN Insomnia  ondansetron Injectable 4 milliGRAM(s) IV Push every 6 hours PRN Nausea and/or Vomiting  oxyCODONE    IR 5 milliGRAM(s) Oral every 4 hours PRN Moderate Pain (4 - 6)  zolpidem 5 milliGRAM(s) Oral at bedtime PRN Insomnia      Allergies    aspirin (Stomach Upset)    Intolerances        Vital Signs Last 24 Hrs  T(C): 36.9 (10 Aug 2023 07:32), Max: 36.9 (10 Aug 2023 00:20)  T(F): 98.4 (10 Aug 2023 07:32), Max: 98.4 (10 Aug 2023 00:20)  HR: 69 (10 Aug 2023 07:32) (69 - 89)  BP: 114/53 (10 Aug 2023 07:32) (114/53 - 128/46)  BP(mean): --  RR: 16 (10 Aug 2023 07:32) (16 - 18)  SpO2: 95% (10 Aug 2023 07:32) (92% - 99%)    Parameters below as of 10 Aug 2023 07:32  Patient On (Oxygen Delivery Method): nasal cannula  O2 Flow (L/min): 6        PHYSICAL EXAMINATION:    NECK:  Supple. No lymphadenopathy. Jugular venous pressure not elevated. Carotids equal.   HEART:   The cardiac impulse has a normal quality. Reg., Nl S1 and S2.  There are no murmurs, rubs or gallops noted  CHEST:  Chest is clear to auscultation. Normal respiratory effort.  ABDOMEN:  Soft and nontender.   EXTREMITIES:  There is no edema.       LABS:                        8.4    7.32  )-----------( 287      ( 10 Aug 2023 07:40 )             26.7     08-10    137  |  101  |  10  ----------------------------<  135<H>  3.8   |  33<H>  |  0.73    Ca    9.1      10 Aug 2023 07:40        Urinalysis Basic - ( 10 Aug 2023 07:40 )    Color: x / Appearance: x / SG: x / pH: x  Gluc: 135 mg/dL / Ketone: x  / Bili: x / Urobili: x   Blood: x / Protein: x / Nitrite: x   Leuk Esterase: x / RBC: x / WBC x   Sq Epi: x / Non Sq Epi: x / Bacteria: x

## 2023-08-10 NOTE — CDI QUERY NOTE - NSCDIOTHERTXTBX_GEN_ALL_CORE_HH
Clinical documentation indicates that this patient has CHF.  Please include more specific documentation of the acuity of the diastolic  Heart Failure in your Progress Note and/or Discharge Summary.    -Acute  on chronic diastolic CHF   -Chronic diastolic CHF, only  -Other (please specify)    SUPPORTING DOCUMENTATION AND/OR CLINICAL EVIDENCE:    - CT Chest No Cont (08.08.23 @ 14:47) >AIRWAYS, LUNGS, PLEURA: Patent central airways. Stable subsegmental atelectasis of the lingula. Mild interlobular septal thickening within the lung bases. The lungs are otherwise clear. Trace right pleural effusion.    -Xray Chest 1 View- PORTABLE-Urgent (Xray Chest 1 View- PORTABLE-Urgent .) (08.07.23 @ 18:17) >IMPRESSION:   RIGHT lower zone diffuse airspace disease.Cardiomegaly..    - Xray Chest 1 View- PORTABLE-Urgent (08.04.23 @ 23:48) >LUNGS: free of consolidation,effusion, or pneumothorax..    -BNP:Pro-Brain Natriuretic Peptide: 4312 pg/mL (08.08.23 @ 07:23)     -MEDICATIONS: furosemide    -Tablet 20 milliGRAM(s) Oral (08-08-23)furosemide   Injectable 40 milliGRAM(s) IV Push (08-09-23)    -Medicine note-8/10-* Abnormal CXR/ chf- diastolic; moderate ASLasix   -Medicine 8/5-* Left hip fxhigh risk for periop complications severe COPD, radiological evidence of CHF, loud murmurcardio and pulm consult

## 2023-08-10 NOTE — PROGRESS NOTE ADULT - SUBJECTIVE AND OBJECTIVE BOX
Date of service: 08-10-23 @ 08:25    Lying in bed in NAD  Has dry cough  No SOB at rest  Left hip discomfort, wound clean    ROS: no fever or chills; denies dizziness, no HA, no SOB or cough, no abdominal pain, no diarrhea or constipation; no dysuria, no legs pain, no rashes    MEDICATIONS  (STANDING):  acetaminophen     Tablet .. 975 milliGRAM(s) Oral every 8 hours  albuterol    0.083% 2.5 milliGRAM(s) Nebulizer every 6 hours  albuterol    90 MICROgram(s) HFA Inhaler 1 Puff(s) Inhalation every 4 hours  allopurinol 300 milliGRAM(s) Oral daily  apixaban 5 milliGRAM(s) Oral every 12 hours  budesonide 160 MICROgram(s)/formoterol 4.5 MICROgram(s) Inhaler 2 Puff(s) Inhalation two times a day  dextrose 5%. 1000 milliLiter(s) (50 mL/Hr) IV Continuous <Continuous>  dextrose 5%. 1000 milliLiter(s) (100 mL/Hr) IV Continuous <Continuous>  dextrose 50% Injectable 25 Gram(s) IV Push once  dextrose 50% Injectable 25 Gram(s) IV Push once  dextrose 50% Injectable 12.5 Gram(s) IV Push once  diltiazem    milliGRAM(s) Oral daily  furosemide   Injectable 40 milliGRAM(s) IV Push daily  glucagon  Injectable 1 milliGRAM(s) IntraMuscular once  insulin lispro (ADMELOG) corrective regimen sliding scale   SubCutaneous Before meals and at bedtime  metoprolol succinate ER 25 milliGRAM(s) Oral daily  polyethylene glycol 3350 17 Gram(s) Oral daily  senna 2 Tablet(s) Oral at bedtime  simvastatin 20 milliGRAM(s) Oral at bedtime  tiotropium 2.5 MICROgram(s) Inhaler 2 Puff(s) Inhalation daily  tranexamic acid IVPB 1000 milliGRAM(s) IV Intermittent once  tranexamic acid IVPB 1000 milliGRAM(s) IV Intermittent once    Vital Signs Last 24 Hrs  T(C): 36.9 (10 Aug 2023 07:32), Max: 36.9 (10 Aug 2023 00:20)  T(F): 98.4 (10 Aug 2023 07:32), Max: 98.4 (10 Aug 2023 00:20)  HR: 69 (10 Aug 2023 07:32) (69 - 89)  BP: 114/53 (10 Aug 2023 07:32) (114/53 - 128/46)  BP(mean): --  RR: 16 (10 Aug 2023 07:32) (16 - 18)  SpO2: 95% (10 Aug 2023 07:32) (92% - 99%)    Parameters below as of 10 Aug 2023 07:32  Patient On (Oxygen Delivery Method): nasal cannula  O2 Flow (L/min): 6     Physical exam:    Constitutional:  No acute distress  HEENT: NC/AT, EOMI, PERRLA, conjunctivae clear; ears and nose atraumatic  Neck: supple; thyroid not palpable  Back: no tenderness  Respiratory: respiratory effort normal; clear to auscultation  Cardiovascular: S1S2 regular, no murmurs  Abdomen: soft, not tender, not distended, positive BS  Genitourinary: no suprapubic tenderness  Lymphatic: no LN palpable  Musculoskeletal: no muscle tenderness, no joint swelling or tenderness  Left hip s/p surgery - wound with no erythema, no discharge   Extremities: no pedal edema  Neurological/ Psychiatric: AxOx3, moving all extremities  Skin: no rashes; no palpable lesions    Labs: all available labs reviewed                        8.2    8.82  )-----------( 273      ( 09 Aug 2023 07:24 )             26.2     08-09    138  |  103  |  11  ----------------------------<  156<H>  3.9   |  30  |  0.72    Ca    9.1      09 Aug 2023 07:24    TPro  x   /  Alb  2.5<L>  /  TBili  x   /  DBili  x   /  AST  x   /  ALT  x   /  AlkPhos  x   08-08     LIVER FUNCTIONS - ( 08 Aug 2023 07:23 )  Alb: 2.5 g/dL / Pro: x     / ALK PHOS: x     / ALT: x     / AST: x     / GGT: x           Culture - Blood (collected 08 Aug 2023 09:17)  Source: .Blood None  Preliminary Report (09 Aug 2023 16:01):    No growth at 24 hours    Culture - Blood (collected 08 Aug 2023 09:17)  Source: .Blood None  Preliminary Report (09 Aug 2023 16:01):    No growth at 24 hours    Culture - Blood (collected 07 Aug 2023 05:17)  Source: .Blood None  Preliminary Report (09 Aug 2023 09:01):    No growth at 48 Hours    Culture - Blood (collected 07 Aug 2023 05:16)  Source: .Blood None  Gram Stain (08 Aug 2023 05:12):    Growth in aerobic bottle: Gram Positive Cocci in Clusters  Final Report (08 Aug 2023 21:59):    Growth in aerobic bottle: Staphylococcus epidermidis    Coagulase Negative Staphylococci isolated from a single blood culture set    may represent contamination.    Contact the Microbiology Department at [phone] if susceptibility    testing is clinically indicated.    Direct identification is available within approximately 3-5    hours either by Blood Panel Multiplexed PCR or Direct    MALDI-TOF. Details: [email or web address]  Organism: Blood Culture PCR (08 Aug 2023 21:59)  Organism: Blood Culture PCR (08 Aug 2023 21:59)      Method Type: PCR      -  Staphylococcus epidermidis, Methicillin resistant: Detec    Radiology: all available radiological tests reviewed    < from: CT Chest No Cont (08.08.23 @ 14:47) >  Stable subsegmental atelectasis of the lingula. Mild interlobular septal   thickening within the lung bases. The lungs are otherwise clear. Trace right pleural effusion.     < end of copied text >      Advanced directives addressed: full resuscitation

## 2023-08-10 NOTE — PROGRESS NOTE ADULT - PROVIDER SPECIALTY LIST ADULT
Cardiology
Hospitalist
Orthopedics
Orthopedics
Pulmonology
Cardiology
Hospitalist
Hospitalist
Orthopedics
Cardiology
Infectious Disease
Pulmonology
Cardiology
Cardiology

## 2023-08-10 NOTE — PROGRESS NOTE ADULT - SUBJECTIVE AND OBJECTIVE BOX
Patient seen and examined at bedside. Pain well controlled with medication. Patient denies any numbness, tingling, weakness, or any other orthopaedic complaint.     LABS:                        8.2    8.82  )-----------( 273      ( 09 Aug 2023 07:24 )             26.2     08-09    138  |  103  |  11  ----------------------------<  156<H>  3.9   |  30  |  0.72    Ca    9.1      09 Aug 2023 07:24    TPro  x   /  Alb  2.5<L>  /  TBili  x   /  DBili  x   /  AST  x   /  ALT  x   /  AlkPhos  x   08-08      Urinalysis Basic - ( 09 Aug 2023 07:24 )    Color: x / Appearance: x / SG: x / pH: x  Gluc: 156 mg/dL / Ketone: x  / Bili: x / Urobili: x   Blood: x / Protein: x / Nitrite: x   Leuk Esterase: x / RBC: x / WBC x   Sq Epi: x / Non Sq Epi: x / Bacteria: x        VITAL SIGNS:  T(C): 36.9 (08-10-23 @ 00:20), Max: 36.9 (08-10-23 @ 00:20)  HR: 80 (08-10-23 @ 00:20) (77 - 89)  BP: 121/52 (08-10-23 @ 00:20) (121/52 - 128/46)  RR: 18 (08-10-23 @ 00:20) (18 - 18)  SpO2: 92% (08-10-23 @ 00:20) (92% - 99%)    Physical Exam:  General: NAD, pt laying in bed comfortably with nebulizer  Compartments soft, compressible  Calves nontender  SCDs in place  Abduction pillow in place    LLE:  Dressings C/D/I  Motor: +GSC, TA, EHL, FHL  SILT: SPN, DPN, Tib, Saph, Mandi  +DP    A/P  Pt is a 82F POD5 L hip melisa    -WBAT, posterior hip precautions  -Abduction pillow in place when laying in bed or sitting  -PT/OT  -Analgesics  -DVT ppx per primary team, currently on Eliquis  -Dispo MANAS  -Ortho stable for DC. F/U in outpatient clinic in 2 weeks.   -Rest, compress, ice, elevate extremity as needed  -Incentive spirometry  -Appreciate medical recs  -1 of 4 blood cxs positive, ID believes likely contaminant and recs observing off abx. No signs of active infxns at this time.  -Will discuss plan with Dr. Cárdenas. Will advise if plan changes.

## 2023-08-10 NOTE — PROGRESS NOTE ADULT - SUBJECTIVE AND OBJECTIVE BOX
CHIEF COMPLAINT: Patient is a 82y old  Female who presents with a chief complaint of sob (06 Aug 2023 09:03)    FROM H&P: Pt is a 81 y/o female w/PMHx of HTN, HLD, DM, Paroxysmal Afib, ANU, COPD on 6L O2 at home, presents after fall c/o hip pain. Pt states she was walking in her house, tripped and fell onto L Hip. Pt noticed immediate pain and inability to ambulate s/p MF. No other orthopedic injuries or complaints. No HS/LOC. No hx of orthopedic surgeries in the past. Recently seen in ED on 6/22/23 for L Proximal humerus fx s/p MF, treated non-operatively. Hx of severe COPD on 6LNC at Home and AFib on Eliquis (Last Dose 6pm 8/4/23). (05 Aug 2023 07:44)    8/5. Cardiology Consultation: Seen for Dr Way. Seen for preop cardiac evaluation. She tripped and fell on 8/4/23 and has a left hip fracture.  8/6. No complaints. S/P ORIF left femoral neck fracture 8/5.  8/7. Very tired, feeling ok, monitor H&H, no CP or sob  8/8. 6l At baseline a t home, OOB to chair, feeling a little weak.  8/9. breathing stable per patient, tired and didnt sleep well. urinated well with iv diuresis.  8/10. Improved, lasix 20mg PO QOD.    MEDICATIONS:   MEDICATIONS  (STANDING):  acetaminophen     Tablet .. 975 milliGRAM(s) Oral every 8 hours  albuterol    0.083% 2.5 milliGRAM(s) Nebulizer every 6 hours  albuterol    90 MICROgram(s) HFA Inhaler 1 Puff(s) Inhalation every 4 hours  allopurinol 300 milliGRAM(s) Oral daily  apixaban 5 milliGRAM(s) Oral every 12 hours  budesonide 160 MICROgram(s)/formoterol 4.5 MICROgram(s) Inhaler 2 Puff(s) Inhalation two times a day  dextrose 5%. 1000 milliLiter(s) (50 mL/Hr) IV Continuous <Continuous>  dextrose 5%. 1000 milliLiter(s) (100 mL/Hr) IV Continuous <Continuous>  dextrose 50% Injectable 25 Gram(s) IV Push once  dextrose 50% Injectable 12.5 Gram(s) IV Push once  dextrose 50% Injectable 25 Gram(s) IV Push once  diltiazem    milliGRAM(s) Oral daily  furosemide   Injectable 40 milliGRAM(s) IV Push daily  glucagon  Injectable 1 milliGRAM(s) IntraMuscular once  insulin lispro (ADMELOG) corrective regimen sliding scale   SubCutaneous Before meals and at bedtime  metoprolol succinate ER 25 milliGRAM(s) Oral daily  polyethylene glycol 3350 17 Gram(s) Oral daily  senna 2 Tablet(s) Oral at bedtime  simvastatin 20 milliGRAM(s) Oral at bedtime  tiotropium 2.5 MICROgram(s) Inhaler 2 Puff(s) Inhalation daily  tranexamic acid IVPB 1000 milliGRAM(s) IV Intermittent once  tranexamic acid IVPB 1000 milliGRAM(s) IV Intermittent once    MEDICATIONS  (PRN):  aluminum hydroxide/magnesium hydroxide/simethicone Suspension 30 milliLiter(s) Oral every 4 hours PRN Dyspepsia  dextrose Oral Gel 15 Gram(s) Oral once PRN Blood Glucose LESS THAN 70 milliGRAM(s)/deciliter  dicyclomine 10 milliGRAM(s) Oral three times a day before meals PRN spasms  HYDROmorphone  Injectable 1 milliGRAM(s) IV Push every 4 hours PRN Severe Pain (7 - 10)  magnesium hydroxide Suspension 30 milliLiter(s) Oral daily PRN Constipation  melatonin 3 milliGRAM(s) Oral at bedtime PRN Insomnia  ondansetron Injectable 4 milliGRAM(s) IV Push every 6 hours PRN Nausea and/or Vomiting  oxyCODONE    IR 5 milliGRAM(s) Oral every 4 hours PRN Moderate Pain (4 - 6)  zolpidem 5 milliGRAM(s) Oral at bedtime PRN Insomnia    HOME MEDICATIONS:   allopurinol 300 mg oral tablet: 1 tab(s) orally once a day (05 Aug 2023 01:19)  Apriso 0.375 g oral capsule, extended release: 4 cap(s) orally once a day (05 Aug 2023 01:19)  Cartia  mg/24 hours oral capsule, extended release: 1 cap(s) orally once a day (05 Aug 2023 01:19)  CoQ10: orally once a day (05 Aug 2023 01:19)  dicyclomine 10 mg oral capsule: 1 cap(s) orally 3 times a day, As Needed (05 Aug 2023 01:19)  Eliquis 5 mg oral tablet: 1 tab(s) orally 2 times a day (05 Aug 2023 01:19)  metFORMIN 500 mg oral tablet: 2 tab(s) orally once a day (in the morning) (05 Aug 2023 01:19)  metFORMIN 500 mg oral tablet: 1 tab(s) orally once (at bedtime) (05 Aug 2023 01:19)  Metoprolol Succinate ER 25 mg oral tablet, extended release: 1 tab(s) orally once a day (05 Aug 2023 01:19)  Pepcid Complete: *** as needed*** (05 Aug 2023 04:34)  Probiotic Formula oral capsule: 1 cap(s) orally 2 times a day (05 Aug 2023 01:19)  Prolia 60 mg/mL subcutaneous solution: subcutaneous every 6 months (05 Aug 2023 01:19)  simvastatin 20 mg oral tablet: 1 tab(s) orally once a day (at bedtime) (05 Aug 2023 01:19)  Trelegy Ellipta 100 mcg-62.5 mcg-25 mcg/inh inhalation powder: 1 puff(s) inhaled once a day (05 Aug 2023 01:19)  Vitamin D3 25 mcg (1000 intl units) oral tablet: 1 tab(s) orally once a day (05 Aug 2023 01:19)  zolpidem 5 mg oral tablet: 1 tab(s) orally once a day (at bedtime), As Needed (05 Aug 2023 01:19)    PHYSICAL EXAM:  T(C): 36.9 (10 Aug 2023 07:32), Max: 36.9 (10 Aug 2023 00:20)  T(F): 98.4 (10 Aug 2023 07:32), Max: 98.4 (10 Aug 2023 00:20)  HR: 69 (10 Aug 2023 07:32) (69 - 89)  BP: 114/53 (10 Aug 2023 07:32) (114/53 - 128/46)  RR: 16 (10 Aug 2023 07:32) (16 - 18)  SpO2: 95% (10 Aug 2023 07:32) (92% - 99%)    Parameters below as of 10 Aug 2023 07:32  Patient On (Oxygen Delivery Method): nasal cannula  O2 Flow (L/min): 6    Constitutional: NAD, awake and alert  HEENT: PERR, EOMI, Normal Hearing, MMM  Neck: Soft and supple, No LAD, No JVD  Respiratory: Breath sounds diminished   Cardiovascular: S1 and S2, regular rate and rhythm, no Murmurs, gallops or rubs  Gastrointestinal: Bowel Sounds present, soft, nontender, nondistended, no guarding, no rebound  Extremities: No peripheral edema  Vascular: 2+ peripheral pulses  Neurological: A/O x 3, no focal deficits  Musculoskeletal: 5/5 strength b/l upper and lower extremities  Skin: No rashes    =======================================    LABS:                         8.4    7.32  )-----------( 287      ( 10 Aug 2023 07:40 )             26.7     08-10    137  |  101  |  10  ----------------------------<  135<H>  3.8   |  33<H>  |  0.73    Ca    9.1      10 Aug 2023 07:40      Troponin: 7.48 ng/L    CARDIAC TESTING:    < from: TTE Echo Complete w/o Contrast w/ Doppler (08.05.23 @ 10:29) >   The aortic valve is well visualized, appears moderately calcified. Valve   opening seems to be restricted.   Moderate aortic stenosis is present based on RONALD 1.2 cm2.   Peak and mean transaortic gradients are 39 and 21 mmHg respectively.   The left atrium is moderately dilated.   Estimated left ventricular ejection fraction is 60-65 %.   The left ventricle is normal in size, wall motion and contractility as   seen in limited views.   Mild concentric left ventricular hypertrophy is present.   All visualized extra cardiac structures appears to be normal.   There is calcification of both mitral valve leaflets. The leaflet opening   is normal.   Mild mitral annular calcification is present.   Mild (1+) mitral regurgitation is present.   EA reversal of the mitral inflow consistent with reduced compliance of   the   left ventricle.   No evidence of pericardial effusion.   No evidence of pleural effusion.   Normal appearing pulmonic valve structure and function.   The right atrium appears mildly dilated.   The right ventricle is mildly dilated.   Normal appearing right ventricle function.   Normal appearing tricuspid valve structure.   Mild to Moderate Tricuspid regurgitation is present.   Moderate pulmonary hypertension.    < end of copied text >      RADIOLOGY & ADDITIONAL STUDIES:    < from: Xray Hip w/ Pelvis 1 View, Left (08.05.23 @ 16:51) >  Status post left hip arthroplasty.    < from: Xray Femur 2 Views, Left (08.04.23 @ 23:48) >  Subcapital fracture.  The patient subsequently underwent ORIF of the left hip.    < from: CT Chest No Cont (08.04.23 @ 23:24) >  Acute left femoral neck fracture.    Mild interlobular septal thickening may reflect mild pulmonary edema.   Scattered small foci of tree-in-bud nodularity in the rightupper and   middle lobes likely reflecting infectious/inflammatory small airways   disease or impacted distal airways.    < end of copied text >

## 2023-08-10 NOTE — PROGRESS NOTE ADULT - ASSESSMENT
81 y/o female with h/o HTN, HLD, DM, Paroxysmal Afib, ANU, COPD on 6L O2 at home was admitted on 8/5 for left hip pain, weakness after fall. Pt states she was walking in her house, tripped and fell onto L Hip. Pt noticed immediate pain and inability to ambulate s/p MF. No other orthopedic injuries or complaints. No HS/LOC. No hx of orthopedic surgeries in the past. Recently seen in ED on 6/22/23 for L Proximal humerus fx s/p MF, treated non-operatively. On 8/5 she underwent a left hip hemiarthroplasty. On 8/7 she deveveloped fever tp 102F an blood cultures were collected. On 8/9 she was reported with bacteremia. Concern about an infectious process was raised.     1. Febrile syndrome resolving. Bacteremia with CNST. Left hip fracture s/p hemiarthroplasty.  -cultures reviewed  -has CNST growing in one BC bottle out of 4 and repeat BC are negative to date  -no clinical signs of sepsis  -postoperative left hip wound is clean  -doubt pneumonia  -f/u cultures  -would continue to observe off abx at this time  -monitor temps  -f/u CBC  -supportive care  2. Other issues:   -care per medicine    d/w medical team

## 2023-08-10 NOTE — PROGRESS NOTE ADULT - ASSESSMENT
PROBLEMS:    S/P Left hip fx  Paroxysmal Afib-on eliquis  Severe COPD-ON 6liter NC home oxygen  T2DM  HTN/HLD/DM  H/O HTN, HLD, DM, Paroxysmal Afib, COPD    PLAN:    pulmonary  with fever & desaturation & RLL infiltrates will do ct chest to r/o pneumonia if Positive required Rx with ABX  PT/oob  S/P ORIF, fracture, femur, neck, left-melisa hip arthroplasty   TITRATE FIO2  ANTI-COAGULANTS AS PER ORTHO  Nebs/AEROSOLS  Prn analgesia  [id number]  DVT ppx

## 2023-08-12 LAB
CULTURE RESULTS: SIGNIFICANT CHANGE UP
SPECIMEN SOURCE: SIGNIFICANT CHANGE UP

## 2023-08-14 LAB — SURGICAL PATHOLOGY STUDY: SIGNIFICANT CHANGE UP

## 2023-08-17 DIAGNOSIS — I11.0 HYPERTENSIVE HEART DISEASE WITH HEART FAILURE: ICD-10-CM

## 2023-08-17 DIAGNOSIS — Z79.51 LONG TERM (CURRENT) USE OF INHALED STEROIDS: ICD-10-CM

## 2023-08-17 DIAGNOSIS — I50.30 UNSPECIFIED DIASTOLIC (CONGESTIVE) HEART FAILURE: ICD-10-CM
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
## 2023-08-17 DIAGNOSIS — S72.002A FRACTURE OF UNSPECIFIED PART OF NECK OF LEFT FEMUR, INITIAL ENCOUNTER FOR CLOSED FRACTURE: ICD-10-CM

## 2023-08-17 DIAGNOSIS — J96.21 ACUTE AND CHRONIC RESPIRATORY FAILURE WITH HYPOXIA: ICD-10-CM

## 2023-08-17 DIAGNOSIS — I48.0 PAROXYSMAL ATRIAL FIBRILLATION: ICD-10-CM

## 2023-08-17 DIAGNOSIS — Z79.2 LONG TERM (CURRENT) USE OF ANTIBIOTICS: ICD-10-CM

## 2023-08-17 DIAGNOSIS — Z79.52 LONG TERM (CURRENT) USE OF SYSTEMIC STEROIDS: ICD-10-CM

## 2023-08-17 DIAGNOSIS — G47.33 OBSTRUCTIVE SLEEP APNEA (ADULT) (PEDIATRIC): ICD-10-CM

## 2023-08-17 DIAGNOSIS — I27.20 PULMONARY HYPERTENSION, UNSPECIFIED: ICD-10-CM

## 2023-08-17 DIAGNOSIS — E11.9 TYPE 2 DIABETES MELLITUS WITHOUT COMPLICATIONS: ICD-10-CM

## 2023-08-17 DIAGNOSIS — J44.9 CHRONIC OBSTRUCTIVE PULMONARY DISEASE, UNSPECIFIED: ICD-10-CM

## 2023-08-17 DIAGNOSIS — Z79.01 LONG TERM (CURRENT) USE OF ANTICOAGULANTS: ICD-10-CM

## 2023-08-17 DIAGNOSIS — Z79.899 OTHER LONG TERM (CURRENT) DRUG THERAPY: ICD-10-CM

## 2023-08-17 DIAGNOSIS — E78.5 HYPERLIPIDEMIA, UNSPECIFIED: ICD-10-CM

## 2023-08-17 DIAGNOSIS — Z79.84 LONG TERM (CURRENT) USE OF ORAL HYPOGLYCEMIC DRUGS: ICD-10-CM
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
## 2023-08-17 DIAGNOSIS — Z88.6 ALLERGY STATUS TO ANALGESIC AGENT: ICD-10-CM

## 2023-08-17 DIAGNOSIS — Z99.81 DEPENDENCE ON SUPPLEMENTAL OXYGEN: ICD-10-CM
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
## 2023-08-17 DIAGNOSIS — I35.0 NONRHEUMATIC AORTIC (VALVE) STENOSIS: ICD-10-CM

## 2023-08-17 DIAGNOSIS — I45.10 UNSPECIFIED RIGHT BUNDLE-BRANCH BLOCK: ICD-10-CM

## 2023-08-17 DIAGNOSIS — Z90.49 ACQUIRED ABSENCE OF OTHER SPECIFIED PARTS OF DIGESTIVE TRACT: ICD-10-CM

## 2023-08-17 DIAGNOSIS — D62 ACUTE POSTHEMORRHAGIC ANEMIA: ICD-10-CM

## 2023-08-18 ENCOUNTER — APPOINTMENT (OUTPATIENT)
Dept: ORTHOPEDIC SURGERY | Facility: CLINIC | Age: 83
End: 2023-08-18
Payer: MEDICARE

## 2023-08-18 PROCEDURE — 99024 POSTOP FOLLOW-UP VISIT: CPT

## 2023-08-18 PROCEDURE — 73502 X-RAY EXAM HIP UNI 2-3 VIEWS: CPT | Mod: 26,LT

## 2023-08-18 NOTE — HISTORY OF PRESENT ILLNESS
[TextEntry] : Patient here for follow-up status post left hip cemented Juan Jose hip arthroplasty on 8/5/2023.  Patient is currently at rehab.  Is taking Tylenol and oxycodone for pain.  Is on Eliquis for DVT prophylaxis.  Ambulating with a walker.  Did not use anything prior to this injury.  Left hip No pain with range of motion, axial load, or logroll Incision healing well without signs of infection Sensation tact light touch throughout foot/LE Positive dorsiflexion/plantarflexion/EHL/FHL  Imaging Hardware in good alignment  Assessment/plan Patient doing well.  Continue physical therapy/encourage ambulation.  Weightbearing as tolerated.  Tramadol or oxycodone for pain.  DVT prophylaxis for at least 30 days.  Pain control as needed.  Patient to follow-up in 4 weeks.

## 2023-08-29 ENCOUNTER — TRANSCRIPTION ENCOUNTER (OUTPATIENT)
Age: 83
End: 2023-08-29

## 2023-09-14 ENCOUNTER — APPOINTMENT (OUTPATIENT)
Dept: ORTHOPEDIC SURGERY | Facility: CLINIC | Age: 83
End: 2023-09-14
Payer: MEDICARE

## 2023-09-14 PROCEDURE — 73502 X-RAY EXAM HIP UNI 2-3 VIEWS: CPT | Mod: 26,LT

## 2023-09-14 PROCEDURE — 99024 POSTOP FOLLOW-UP VISIT: CPT

## 2023-09-15 ENCOUNTER — TRANSCRIPTION ENCOUNTER (OUTPATIENT)
Age: 83
End: 2023-09-15

## 2023-09-17 ENCOUNTER — TRANSCRIPTION ENCOUNTER (OUTPATIENT)
Age: 83
End: 2023-09-17

## 2023-10-11 ENCOUNTER — TRANSCRIPTION ENCOUNTER (OUTPATIENT)
Age: 83
End: 2023-10-11

## 2023-10-23 ENCOUNTER — APPOINTMENT (OUTPATIENT)
Dept: ORTHOPEDIC SURGERY | Facility: CLINIC | Age: 83
End: 2023-10-23
Payer: MEDICARE

## 2023-10-23 VITALS
HEIGHT: 64 IN | WEIGHT: 200 LBS | DIASTOLIC BLOOD PRESSURE: 69 MMHG | HEART RATE: 69 BPM | BODY MASS INDEX: 34.15 KG/M2 | SYSTOLIC BLOOD PRESSURE: 124 MMHG

## 2023-10-23 DIAGNOSIS — M76.52 PATELLAR TENDINITIS, LEFT KNEE: ICD-10-CM

## 2023-10-23 PROCEDURE — 73564 X-RAY EXAM KNEE 4 OR MORE: CPT | Mod: LT

## 2023-10-23 PROCEDURE — 73502 X-RAY EXAM HIP UNI 2-3 VIEWS: CPT | Mod: LT

## 2023-10-23 PROCEDURE — 99214 OFFICE O/P EST MOD 30 MIN: CPT | Mod: 24

## 2023-11-15 NOTE — DISCHARGE NOTE PROVIDER - NSDCQMPCI_CARD_ALL_CORE
Palliative Care Initial Visit           Date of Visit: 11/15/2023   Visit Made By: Madelin Goddard CNP    Inpatient consult to Palliative Care  Consult performed by: Madelin Goddard CNP  Consult ordered by: Brandy Pereyra DO        Number of hospitalizations in the last year: 1  Last [facility] Discharge Date4/14/2022  Reason for Palliative Care: Goals of Care and Pain/Symptom Management      Subjective           History of Present Illness  History Obtained by: Chart Review    82 year old male with Parkinson's disease, hypertension, ESRD on HD, seizure disorder, who presents to [facility] on 11/13/2023 with AMS during dialysis.  In ED, noted to be febrile 100.6, lactic acid 5.1, WBC 13.5.  CT head negative for any acute normalities.  CT chest with new infiltrate/pneumonia in left lower lung.  He was found to be positive for COVID 19.  ID following, he was started on remdesivir.  Requiring 2 L nasal cannula therefore placed on steroids.  Also on antibiotics for pneumonia.  Seen by speech therapy, baseline dysphagia due to Parkinson's with dementia, currently exacerbated by acute medical conditions, speech recommending n.p.o. at this time.  Palliative team on consult to assist with goals of care conversations.    Seen this afternoon, Mr. Parsons is asleep but wakes easily to verbal stimuli. He makes eye contact and tracks, attempts to speak. No family present.   He had DHT placed today d/t dysphagia, RN applying mitten restraints d/t concern for self-removal of DHT.    Past Medical History  Past Medical History:   Diagnosis Date   • Chronic kidney disease    • History of colonoscopy 01/06/2011    tubular adenomatous polyps noted.  H-pylori negative in EGD.   • History of esophagogastroduodenoscopy 01/06/2011    H-pylori negative   • History of fracture of rib     right side, closed, inittial encounter   • History of pneumothorax    • History of positive PPD    • Injury of back     injury of back thorax, initial encounter   • Tendonitis  of shoulder        Past Surgical History  Past Surgical History:   Procedure Laterality Date   • Cataract extraction, bilateral         Past Social History   reports that he has never smoked. He has never used smokeless tobacco. He reports that he does not drink alcohol and does not use drugs.          Past Spiritual/Cultural History  [place] Tradition/Faith Affiliation: None/No Preference         Past Family History  family history includes Heart disease in his father; Stroke in his father.    Review of Systems   Unable to perform ROS: Acuity of condition           Palliative Care Assessment Tools      Palliative Performance Scale 30%                  Functional Assessment Staging (FAST) fast 7C            Objective      Allergies  No Known Allergies    Medications:  Scheduled Meds: • [START ON 11/16/2023] valproic acid (DEPAKENE) solution 500 mg Per NG Tube Daily   • valproic acid (DEPAKENE) solution 1,000 mg Per NG Tube QHS   • [Held by provider] heparin (porcine) injection 5,000 Units Subcutaneous 3 times per day   • cefTRIAXone (ROCEPHIN) syringe 2,000 mg Intravenous Nightly   • B complex-vitamin C-folic acid (NEPHRO-SKYLAR) tablet 0.8 mg Oral Daily   • [Held by provider] calcitRIOL (ROCALTROL) capsule 0.5 mcg Oral Once per day on Mon Wed Fri   • [Held by provider] sevelamer carbonate (RENVELA) tablet 800 mg Oral TID WC   • remdesivir (VEKLURY) 100 mg in sodium chloride 0.9 % 250 mL total volume infusion Intravenous Daily at noon   • dexAMETHasone (DECADRON) tablet 6 mg Oral Daily with breakfast   • sodium chloride 0.9 % injection 2 mL Intracatheter 2 times per day   • carbidopa-levodopa (SINEMET)  MG per tablet 2 tablet Oral TID   • lamoTRIgine (LaMICtal) tablet 100 mg Oral BID     Continuous Infusions: • dextrose 5 % / sodium chloride 0.9% infusion   Intravenous     PRN Meds:   • lipase-protease-amylase 10,440-39,150-39,150 units (VIOKACE) per tablet 2 tablet  2 tablet Per G Tube PRN    And   • sodium  bicarbonate tablet 650 mg  650 mg Per G Tube PRN   • acetaminophen (TYLENOL) tablet 650 mg  650 mg Oral Q4H PRN   • docusate sodium-sennosides (SENOKOT S) 50-8.6 MG 2 tablet  2 tablet Oral Daily PRN   • sodium chloride 0.9 % flush bag 25 mL  25 mL Intravenous PRN   • sodium chloride (NORMAL SALINE) 0.9 % bolus 100-200 mL  100-200 mL Intravenous PRN   • acetaminophen (TYLENOL) suppository 650 mg  650 mg Rectal Q4H PRN   • LORazepam (ATIVAN) injection 1 mg  1 mg Intravenous Q8H PRN   • sodium chloride 0.9 % flush bag 25 mL  25 mL Intravenous PRN       Vital Signs:   Vital Last Value (24 Hour) 24 Hour Range   Temperature 98.2 °F (36.8 °C) (11/15/23 1156) Temp  Min: 98.1 °F (36.7 °C)  Max: 99.5 °F (37.5 °C)   Pulse 77 (11/15/23 1156) Pulse  Min: 77  Max: 87   Arterial   Blood Pressure   No data recorded   Non-Invasive  Blood Pressure 111/64 (11/15/23 1156) BP  Min: 111/64  Max: 145/69   Central Venous Pressure   No data recorded   Respiratory 18 (11/15/23 1156) Resp  Min: 18  Max: 20   SpO2 94 % (11/15/23 1156) SpO2  Min: 90 %  Max: 96 %   Last BM: 1 (11/15/23 0900)    Weight Trends  Wt Readings from Last 10 Encounters:   11/15/23 58.1 kg (128 lb)   06/29/23 54.3 kg (119 lb 13.1 oz)   03/02/23 53 kg (116 lb 13.5 oz)   11/22/22 53 kg (116 lb 13.5 oz)   08/11/22 53.8 kg (118 lb 8 oz)   05/19/22 47.1 kg (103 lb 15.2 oz)   04/13/22 54.2 kg (119 lb 7.8 oz)   03/03/22 56.4 kg (124 lb 7.2 oz)   03/02/22 57.2 kg (126 lb)   01/25/22 58 kg (127 lb 12.1 oz)       Physical Exam  Vitals and nursing note reviewed.   HENT:      Head: Normocephalic and atraumatic.      Nose:      Comments: DHT in place     Mouth/Throat:      Mouth: Mucous membranes are moist.      Pharynx: Oropharynx is clear.      Neck: Normal range of motion.   Eyes:      Pupils: Pupils are equal, round, and reactive to light.   Cardiovascular:      Rate and Rhythm: Normal rate and regular rhythm.   Pulmonary:      Effort: Pulmonary effort is normal.      Breath  sounds: Normal breath sounds.   Abdominal:      General: Bowel sounds are normal.      Palpations: Abdomen is soft.   Musculoskeletal:         General: Normal range of motion.   Skin:     General: Skin is warm and dry.   Neurological:      Mental Status: He is alert.      Comments: Nonverbal, does not follow commands   Psychiatric:         Mood and Affect: Mood normal.         Behavior: Behavior normal.         Labs & Imaging  Recent Labs   Lab 11/15/23  0341 11/14/23  0915 11/14/23  0333   SODIUM 142 138 137   POTASSIUM 3.9 4.0 3.9   CHLORIDE 104 103 99   CO2 25 27 27   BUN 76* 62* 55*   CREATININE 6.99* 5.84* 5.26*   CALCIUM 8.8 8.9 8.4   ALBUMIN 1.6* 1.7* 1.9*   BILIRUBIN 0.3 0.9 0.5   ALKPT 111 125* 144*   GPT <6 <6 7   * 96* 90*   GLUCOSE 85 75 80      Recent Labs   Lab 11/15/23  0341   WBC 10.9   RBC 2.47*   HGB 8.7*   HCT 27.7*           Results for orders placed or performed during the hospital encounter of 11/13/23 (from the past 72 hour(s))   XR CHEST PA OR AP 1 VIEW    Impression    There is new infiltrate/pneumonia in the left lower lung.  Remaining lungs are clear. Heart size is normal. Left upper extremity  venous stents are also redemonstrated.    Electronically Signed by: CHIDI PHAM M.D.   Signed on: 11/13/2023 5:25 PM   Workstation ID: [id number]   CT HEAD WO CONTRAST    Impression    No acute abnormalities.         Electronically Signed by: CHIDI PHAM M.D.   Signed on: 11/13/2023 5:34 PM   Workstation ID: [id number]          Assessment          Mr. Iglesia Parsons is an 83 yo man with  -Parkinson's dementia  -Encephalopathy  -COVID 19 infection  -ESRD on HD  -Dysphagia     Plan      Please see note from Elaine Caldera LCSW, from 11/14 which summarizes goals of care meeting with family    Overall plan at this time to continue current medical intervention, with no escalation, and possible discontinuation of dialysis/transition to comfort care on Friday if no  improvement is seen(ongoing dysphagia, limited ability to   Interact with family, etc.)    Appears comfortable at this time   -tylenol available PRN for fever or pain   -low dose opioid should be considered if resp distress develops (currently none). Morphine should be avoided d/t ESRD           Advance Care Planning/Goals of Care/Discussion      Medical Decision-making capacity: No  POAHC/Substitute Decision Maker: POAHC / Document Located: In EMR  Puja Madrigal  Current   Code Status: Do Not Resuscitate    ACP Document Type: POLST ([place]) In EMR                 Total time spent today on this visit is 75 minutes which includes reviewing tests in preparation to see patient, obtaining and reviewing separately obtained history, performing a medically appropriate exam and evaluation, counseling and educating the patient/family/caregiver and communicating with other healthcare professionals.   Discussed With: RN    Thank you for involving Palliative Care.  Please contact the covering provider via [employer] ([place])/Page ([place]) with further questions or concerns.    Madelin Goddard CNP          Initial Note For Department Use Only          Primary Diagnosis: Infection  Problem List/Pertinent Diagnosis: Dementia  LVAD: No  #1 Pre-Visit: Yes  #1 Post-Visit: Yes  #2 Pre-Visit: Yes  #2 Post-Visit: Yes  #3 Pre-Visit: Yes  #3 Post-Visit: Yes          No

## 2023-12-05 NOTE — PATIENT PROFILE ADULT - NSPRESCRALCFREQ_GEN_A_NUR
Quality 110: Preventive Care And Screening: Influenza Immunization: Influenza Immunization Administered during Influenza season
Detail Level: Detailed
Never

## 2024-01-23 ENCOUNTER — APPOINTMENT (OUTPATIENT)
Dept: ORTHOPEDIC SURGERY | Facility: CLINIC | Age: 84
End: 2024-01-23
Payer: MEDICARE

## 2024-01-23 ENCOUNTER — NON-APPOINTMENT (OUTPATIENT)
Age: 84
End: 2024-01-23

## 2024-01-23 DIAGNOSIS — M17.12 UNILATERAL PRIMARY OSTEOARTHRITIS, LEFT KNEE: ICD-10-CM

## 2024-01-23 DIAGNOSIS — M25.552 PAIN IN LEFT HIP: ICD-10-CM

## 2024-01-23 DIAGNOSIS — Z96.649 PRESENCE OF UNSPECIFIED ARTIFICIAL HIP JOINT: ICD-10-CM

## 2024-01-23 PROCEDURE — 99214 OFFICE O/P EST MOD 30 MIN: CPT | Mod: 25

## 2024-01-23 PROCEDURE — 73564 X-RAY EXAM KNEE 4 OR MORE: CPT | Mod: LT

## 2024-01-23 PROCEDURE — 20610 DRAIN/INJ JOINT/BURSA W/O US: CPT | Mod: LT

## 2024-01-23 PROCEDURE — 73502 X-RAY EXAM HIP UNI 2-3 VIEWS: CPT | Mod: LT

## 2024-01-23 NOTE — PROCEDURE
[de-identified] : 1/23/24: Left knee injection - Steroid The risks, benefits, and alternatives of the injection were reviewed/discussed with the patient today in office and all of their questions were answered. We discussed possible side effects and efficacy of this injection.  The patient consented to the procedure.  Site and side were verified with the patient.  The inferolateral injection site was prepped with chloroprep.  Cold spray was utilized to numb the skin. Utilizing sterile technique, the knee was injected with 1 ml 40 mg methylprednisolone, 4 ml 1% Lidocaine, 5 mL 0.25% Bupivicaine. A sterile bandage was applied. The patient tolerated the procedure well and there were no complications.

## 2024-01-23 NOTE — DISCUSSION/SUMMARY
[de-identified] : Left knee osteoarthritis, left hip greater troch bursitis status post Juan Jose hip arthroplasty  Extensive discussion of the natural history of this issue was had with the patient.  We discussed the treatment options focusing on conservative therapy which includes anti-inflammatories, physical therapy/home exercise, & activity modification.   Recommend topical Voltaren gel. Continue physical therapy.  Patient elected for*injection left knee.  Discussed we could try 1 in the left greater troch bursa if she continues to have pain around there. Patient will return if the pain returns or does not resolve.    The patient's current medication management of their orthopedic diagnosis was reviewed today: (1) We discussed a comprehensive treatment plan that included possible pharmaceutical management involving the use of prescription strength medications including but not limited to options such as oral Ibuprofen 400mg QID, once daily Meloxicam 15 mg, or 500-650 mg Tylenol versus over the counter oral medications and topical prescription NSAID Pennsaid vs over the counter Voltaren gel. (2) There is a moderate risk of morbidity with further treatment, especially from use of prescription strength medications and possible side effects of these medications which include upset stomach with oral medications, skin reactions to topical medications and cardiac/renal issues with long term use. (3) I recommended that the patient follow-up with their medical physician to discuss any significant specific potential issues with long term medication use such as interactions with current medications or with exacerbation of underlying medical comorbidities. (4) The benefits and risks associated with use of injectable, oral or topical, prescription and over the counter anti-inflammatory medications were discussed with the patient. The patient voiced understanding of the risks including but not limited to bleeding, stroke, kidney dysfunction, heart disease, and were referred to the black box warning label for further information.

## 2024-01-23 NOTE — PHYSICAL EXAM
[de-identified] : 1/23/24: Left knee xrays, standing AP/Lateral and Merchant films, and 45 degree PA standing view are reviewed and demonstrate diffuse tricompartmental degenerative arthritis, medial and lateral joint space narrowing, marginal osteophytes, bone on bone with sclerosis, patellofemoral joint space narrowing Left hip AP lateral and AP pelvis-hardware in good alignment 10/23/23: Left knee xrays, standing AP/Lateral and Merchant films, and 45 degree PA standing view are reviewed and demonstrate diffuse tricompartmental degenerative arthritis, medial and lateral joint space narrowing, marginal osteophytes, bone on bone with sclerosis, patellofemoral joint space narrowing  [de-identified] : Left lower extremity Hip: Normal ROM without pain on IR/ER Knee Inspection: Minimal effusion Wounds: None Alignment: Neutral Palpation: tender to palpation at medial joint line ROM active (in degrees): 0-120 with crepitus/pain through the arc of motion Ligamentous laxity: all ligaments appear stable, stable to varus stress test, stable to valgus stress test Muscle Test: good quad strength.

## 2024-01-23 NOTE — HISTORY OF PRESENT ILLNESS
[de-identified] : 1/23/24: Patient here status post left hip cemented Juan Jose hip arthroplasty on 8/5/2023.  States she has some groin pain and lateral hip pain.  Doing physical therapy.  Ambulating with a walker.  On Eliquis.  Knee pain is also continuing bothering her.  10/23/23: Patient presenting with complaints of left knee pain.  Pain is mostly on the anterior aspect of knee.  Denies buckling symptoms or radiating pain.

## 2024-04-25 NOTE — ED PROVIDER NOTE - EYES NEGATIVE STATEMENT, MLM
I saw and evaluated this patient with my surgeon Dr. Pablo Shipley MD and physical therapy.  The patient's physical therapist Jerardo Mary DPT was also present.  The patient notes increasing numbness of the anterior shin that is progressed to the lateral aspect of the knee.  She did receive a nerve block during her manipulation on 04/22/2024, 3 days ago.  She had full motor function up until yesterday when she noted quadricep deficits.  She now has 3 - knee extension with associated numbness as previously described.  The patient notes past medical history of low back pain and a bulging disc in the lumbar region.  We will be sending her to the emergency department for immediate neuro evaluation.  She denies any saddle anesthesia, bowel or bladder dysfunction, or other signs of infection.         Frank Connell PA-C    Physician Assistant - Certified  Orthopedic Surgery   [facility]    04/25/24 3:19 PM    
no discharge, no irritation, no pain, no redness, and no visual changes.

## 2024-05-16 ENCOUNTER — OFFICE (OUTPATIENT)
Dept: URBAN - METROPOLITAN AREA CLINIC 102 | Facility: CLINIC | Age: 84
Setting detail: OPHTHALMOLOGY
End: 2024-05-16
Payer: MEDICARE

## 2024-05-16 DIAGNOSIS — H02.132: ICD-10-CM

## 2024-05-16 DIAGNOSIS — H43.393: ICD-10-CM

## 2024-05-16 DIAGNOSIS — H18.521: ICD-10-CM

## 2024-05-16 DIAGNOSIS — E11.9: ICD-10-CM

## 2024-05-16 DIAGNOSIS — H35.373: ICD-10-CM

## 2024-05-16 DIAGNOSIS — H26.493: ICD-10-CM

## 2024-05-16 DIAGNOSIS — H16.223: ICD-10-CM

## 2024-05-16 DIAGNOSIS — H18.522: ICD-10-CM

## 2024-05-16 DIAGNOSIS — H35.363: ICD-10-CM

## 2024-05-16 DIAGNOSIS — H18.523: ICD-10-CM

## 2024-05-16 DIAGNOSIS — H02.135: ICD-10-CM

## 2024-05-16 PROCEDURE — 92250 FUNDUS PHOTOGRAPHY W/I&R: CPT | Performed by: OPHTHALMOLOGY

## 2024-05-16 PROCEDURE — 92014 COMPRE OPH EXAM EST PT 1/>: CPT | Performed by: OPHTHALMOLOGY

## 2024-05-16 ASSESSMENT — CONFRONTATIONAL VISUAL FIELD TEST (CVF)
OD_FINDINGS: FULL
OS_FINDINGS: FULL

## 2024-05-16 ASSESSMENT — LID POSITION - ECTROPION
OD_ECTROPION: RLL T
OS_ECTROPION: LLL T

## 2024-07-20 ENCOUNTER — EMERGENCY (EMERGENCY)
Facility: HOSPITAL | Age: 84
LOS: 0 days | Discharge: HOME | End: 2024-07-21
Attending: STUDENT IN AN ORGANIZED HEALTH CARE EDUCATION/TRAINING PROGRAM
Payer: MEDICARE

## 2024-07-20 VITALS
HEART RATE: 82 BPM | RESPIRATION RATE: 20 BRPM | DIASTOLIC BLOOD PRESSURE: 44 MMHG | OXYGEN SATURATION: 88 % | SYSTOLIC BLOOD PRESSURE: 124 MMHG | TEMPERATURE: 98 F

## 2024-07-20 DIAGNOSIS — R55 SYNCOPE AND COLLAPSE: ICD-10-CM

## 2024-07-20 DIAGNOSIS — Z79.01 LONG TERM (CURRENT) USE OF ANTICOAGULANTS: ICD-10-CM

## 2024-07-20 DIAGNOSIS — R53.1 WEAKNESS: ICD-10-CM

## 2024-07-20 DIAGNOSIS — R42 DIZZINESS AND GIDDINESS: ICD-10-CM

## 2024-07-20 DIAGNOSIS — K04.7 PERIAPICAL ABSCESS WITHOUT SINUS: ICD-10-CM

## 2024-07-20 DIAGNOSIS — Z88.6 ALLERGY STATUS TO ANALGESIC AGENT: ICD-10-CM

## 2024-07-20 DIAGNOSIS — I45.10 UNSPECIFIED RIGHT BUNDLE-BRANCH BLOCK: ICD-10-CM

## 2024-07-20 DIAGNOSIS — J44.9 CHRONIC OBSTRUCTIVE PULMONARY DISEASE, UNSPECIFIED: ICD-10-CM

## 2024-07-20 DIAGNOSIS — Z90.49 ACQUIRED ABSENCE OF OTHER SPECIFIED PARTS OF DIGESTIVE TRACT: Chronic | ICD-10-CM

## 2024-07-20 LAB
ALBUMIN SERPL ELPH-MCNC: 3.4 G/DL — SIGNIFICANT CHANGE UP (ref 3.3–5)
ALP SERPL-CCNC: 84 U/L — SIGNIFICANT CHANGE UP (ref 40–120)
ALT FLD-CCNC: 13 U/L — SIGNIFICANT CHANGE UP (ref 12–78)
ANION GAP SERPL CALC-SCNC: 7 MMOL/L — SIGNIFICANT CHANGE UP (ref 5–17)
APPEARANCE UR: CLEAR — SIGNIFICANT CHANGE UP
AST SERPL-CCNC: 15 U/L — SIGNIFICANT CHANGE UP (ref 15–37)
BASOPHILS # BLD AUTO: 0.05 K/UL — SIGNIFICANT CHANGE UP (ref 0–0.2)
BASOPHILS NFR BLD AUTO: 0.3 % — SIGNIFICANT CHANGE UP (ref 0–2)
BILIRUB SERPL-MCNC: 0.6 MG/DL — SIGNIFICANT CHANGE UP (ref 0.2–1.2)
BILIRUB UR-MCNC: NEGATIVE — SIGNIFICANT CHANGE UP
BUN SERPL-MCNC: 16 MG/DL — SIGNIFICANT CHANGE UP (ref 7–23)
CALCIUM SERPL-MCNC: 9.3 MG/DL — SIGNIFICANT CHANGE UP (ref 8.5–10.1)
CHLORIDE SERPL-SCNC: 103 MMOL/L — SIGNIFICANT CHANGE UP (ref 96–108)
CO2 SERPL-SCNC: 25 MMOL/L — SIGNIFICANT CHANGE UP (ref 22–31)
COLOR SPEC: YELLOW — SIGNIFICANT CHANGE UP
CREAT SERPL-MCNC: 1.38 MG/DL — HIGH (ref 0.5–1.3)
DIFF PNL FLD: NEGATIVE — SIGNIFICANT CHANGE UP
EGFR: 38 ML/MIN/1.73M2 — LOW
EGFR: 38 ML/MIN/1.73M2 — LOW
EOSINOPHIL # BLD AUTO: 0 K/UL — SIGNIFICANT CHANGE UP (ref 0–0.5)
EOSINOPHIL NFR BLD AUTO: 0 % — SIGNIFICANT CHANGE UP (ref 0–6)
GLUCOSE SERPL-MCNC: 212 MG/DL — HIGH (ref 70–99)
GLUCOSE UR QL: NEGATIVE MG/DL — SIGNIFICANT CHANGE UP
HCT VFR BLD CALC: 39.8 % — SIGNIFICANT CHANGE UP (ref 34.5–45)
HGB BLD-MCNC: 13.2 G/DL — SIGNIFICANT CHANGE UP (ref 11.5–15.5)
IMM GRANULOCYTES NFR BLD AUTO: 0.3 % — SIGNIFICANT CHANGE UP (ref 0–0.9)
KETONES UR-MCNC: NEGATIVE MG/DL — SIGNIFICANT CHANGE UP
LEUKOCYTE ESTERASE UR-ACNC: NEGATIVE — SIGNIFICANT CHANGE UP
LIDOCAIN IGE QN: 21 U/L — SIGNIFICANT CHANGE UP (ref 13–75)
LYMPHOCYTES # BLD AUTO: 0.7 K/UL — LOW (ref 1–3.3)
LYMPHOCYTES # BLD AUTO: 4.5 % — LOW (ref 13–44)
MAGNESIUM SERPL-MCNC: 1.8 MG/DL — SIGNIFICANT CHANGE UP (ref 1.6–2.6)
MCHC RBC-ENTMCNC: 30.3 PG — SIGNIFICANT CHANGE UP (ref 27–34)
MCHC RBC-ENTMCNC: 33.2 GM/DL — SIGNIFICANT CHANGE UP (ref 32–36)
MCV RBC AUTO: 91.5 FL — SIGNIFICANT CHANGE UP (ref 80–100)
MONOCYTES # BLD AUTO: 0.97 K/UL — HIGH (ref 0–0.9)
MONOCYTES NFR BLD AUTO: 6.3 % — SIGNIFICANT CHANGE UP (ref 2–14)
NEUTROPHILS # BLD AUTO: 13.62 K/UL — HIGH (ref 1.8–7.4)
NEUTROPHILS NFR BLD AUTO: 88.6 % — HIGH (ref 43–77)
NITRITE UR-MCNC: NEGATIVE — SIGNIFICANT CHANGE UP
NT-PROBNP SERPL-SCNC: 1053 PG/ML — HIGH (ref 0–450)
PH UR: 5.5 — SIGNIFICANT CHANGE UP (ref 5–8)
PLATELET # BLD AUTO: 272 K/UL — SIGNIFICANT CHANGE UP (ref 150–400)
POTASSIUM SERPL-MCNC: 4.2 MMOL/L — SIGNIFICANT CHANGE UP (ref 3.5–5.3)
POTASSIUM SERPL-SCNC: 4.2 MMOL/L — SIGNIFICANT CHANGE UP (ref 3.5–5.3)
PROT SERPL-MCNC: 7.4 GM/DL — SIGNIFICANT CHANGE UP (ref 6–8.3)
PROT UR-MCNC: NEGATIVE MG/DL — SIGNIFICANT CHANGE UP
RBC # BLD: 4.35 M/UL — SIGNIFICANT CHANGE UP (ref 3.8–5.2)
RBC # FLD: 14.7 % — HIGH (ref 10.3–14.5)
SODIUM SERPL-SCNC: 135 MMOL/L — SIGNIFICANT CHANGE UP (ref 135–145)
SP GR SPEC: >1.03 — HIGH (ref 1–1.03)
TROPONIN I, HIGH SENSITIVITY RESULT: 6.92 NG/L — SIGNIFICANT CHANGE UP
UROBILINOGEN FLD QL: 0.2 MG/DL — SIGNIFICANT CHANGE UP (ref 0.2–1)
WBC # BLD: 15.39 K/UL — HIGH (ref 3.8–10.5)
WBC # FLD AUTO: 15.39 K/UL — HIGH (ref 3.8–10.5)

## 2024-07-20 PROCEDURE — 70450 CT HEAD/BRAIN W/O DYE: CPT | Mod: 26,MC

## 2024-07-20 PROCEDURE — 93005 ELECTROCARDIOGRAM TRACING: CPT

## 2024-07-20 PROCEDURE — 99285 EMERGENCY DEPT VISIT HI MDM: CPT | Mod: 25

## 2024-07-20 PROCEDURE — 93010 ELECTROCARDIOGRAM REPORT: CPT

## 2024-07-20 PROCEDURE — 80053 COMPREHEN METABOLIC PANEL: CPT

## 2024-07-20 PROCEDURE — 70487 CT MAXILLOFACIAL W/DYE: CPT | Mod: 26,MC

## 2024-07-20 PROCEDURE — 71045 X-RAY EXAM CHEST 1 VIEW: CPT | Mod: 26

## 2024-07-20 PROCEDURE — 84484 ASSAY OF TROPONIN QUANT: CPT

## 2024-07-20 PROCEDURE — 70450 CT HEAD/BRAIN W/O DYE: CPT | Mod: MC

## 2024-07-20 PROCEDURE — 83880 ASSAY OF NATRIURETIC PEPTIDE: CPT

## 2024-07-20 PROCEDURE — 83690 ASSAY OF LIPASE: CPT

## 2024-07-20 PROCEDURE — 83735 ASSAY OF MAGNESIUM: CPT

## 2024-07-20 PROCEDURE — 71045 X-RAY EXAM CHEST 1 VIEW: CPT

## 2024-07-20 PROCEDURE — 81003 URINALYSIS AUTO W/O SCOPE: CPT

## 2024-07-20 PROCEDURE — 99285 EMERGENCY DEPT VISIT HI MDM: CPT

## 2024-07-20 PROCEDURE — 36415 COLL VENOUS BLD VENIPUNCTURE: CPT

## 2024-07-20 PROCEDURE — 70487 CT MAXILLOFACIAL W/DYE: CPT | Mod: MC

## 2024-07-20 PROCEDURE — 85025 COMPLETE CBC W/AUTO DIFF WBC: CPT

## 2024-07-21 VITALS
RESPIRATION RATE: 20 BRPM | OXYGEN SATURATION: 88 % | HEART RATE: 79 BPM | DIASTOLIC BLOOD PRESSURE: 62 MMHG | SYSTOLIC BLOOD PRESSURE: 128 MMHG

## 2024-08-05 ENCOUNTER — APPOINTMENT (OUTPATIENT)
Dept: ORTHOPEDIC SURGERY | Facility: CLINIC | Age: 84
End: 2024-08-05

## 2024-08-05 PROCEDURE — 99214 OFFICE O/P EST MOD 30 MIN: CPT | Mod: 25

## 2024-08-05 PROCEDURE — 20610 DRAIN/INJ JOINT/BURSA W/O US: CPT | Mod: LT

## 2024-08-05 PROCEDURE — 73502 X-RAY EXAM HIP UNI 2-3 VIEWS: CPT | Mod: LT

## 2024-08-05 PROCEDURE — 73564 X-RAY EXAM KNEE 4 OR MORE: CPT | Mod: LT

## 2024-08-05 NOTE — DISCUSSION/SUMMARY
[de-identified] : Left knee osteoarthritis,  status post Juan Jose hip arthroplasty  Extensive discussion of the natural history of this issue was had with the patient.  We discussed the treatment options focusing on conservative therapy which includes anti-inflammatories, physical therapy/home exercise, & activity modification.   Recommend topical Voltaren gel. New prescription for physical therapy.   Patient elected for a steroid injection left knee. Patient will return if the pain returns or does not resolve.  She will follow-up for the hip annually.  The patient's current medication management of their orthopedic diagnosis was reviewed today: (1) We discussed a comprehensive treatment plan that included possible pharmaceutical management involving the use of prescription strength medications including but not limited to options such as oral Ibuprofen 400mg QID, once daily Meloxicam 15 mg, or 500-650 mg Tylenol versus over the counter oral medications and topical prescription NSAID Pennsaid vs over the counter Voltaren gel. (2) There is a moderate risk of morbidity with further treatment, especially from use of prescription strength medications and possible side effects of these medications which include upset stomach with oral medications, skin reactions to topical medications and cardiac/renal issues with long term use. (3) I recommended that the patient follow-up with their medical physician to discuss any significant specific potential issues with long term medication use such as interactions with current medications or with exacerbation of underlying medical comorbidities. (4) The benefits and risks associated with use of injectable, oral or topical, prescription and over the counter anti-inflammatory medications were discussed with the patient. The patient voiced understanding of the risks including but not limited to bleeding, stroke, kidney dysfunction, heart disease, and were referred to the black box warning label for further information.

## 2024-08-05 NOTE — PHYSICAL EXAM
[de-identified] : Left lower extremity Hip: Normal ROM without pain on IR/ER Knee Inspection: Minimal effusion Wounds: None Alignment: Neutral Palpation: Nontender palpation ROM active (in degrees): 0-120 with crepitus/pain through the arc of motion Ligamentous laxity: all ligaments appear stable, stable to varus stress test, stable to valgus stress test Muscle Test: good quad strength. [de-identified] : 8/5/24: Left knee xrays, standing AP/Lateral and Merchant films, and 45 degree PA standing view are reviewed and demonstrate diffuse tricompartmental degenerative arthritis, medial and lateral joint space narrowing, marginal osteophytes, patellofemoral joint space narrowing AP pelvis and AP lateral left hip-hardware in good alignment 1/23/24: Left knee xrays, standing AP/Lateral and Merchant films, and 45 degree PA standing view are reviewed and demonstrate diffuse tricompartmental degenerative arthritis, medial and lateral joint space narrowing, marginal osteophytes, bone on bone with sclerosis, patellofemoral joint space narrowing Left hip AP lateral and AP pelvis-hardware in good alignment 10/23/23: Left knee xrays, standing AP/Lateral and Merchant films, and 45 degree PA standing view are reviewed and demonstrate diffuse tricompartmental degenerative arthritis, medial and lateral joint space narrowing, marginal osteophytes, bone on bone with sclerosis, patellofemoral joint space narrowing

## 2024-08-05 NOTE — PROCEDURE
[de-identified] : 8/5/24: Left knee injection - Steroid (Methylprednisolone) The risks, benefits, and alternatives of the injection were reviewed/discussed with the patient today in office and all of their questions were answered. We discussed possible side effects and efficacy of this injection.  The patient consented to the procedure.  Prior to injection, a Time Out was conducted in accordance with [facility] policy and the site and nature of procedure verified with the patient.  Site and side were verified with the patient.  The injection site was prepped with chloroprep.  Cold spray was utilized to numb the skin. Utilizing sterile technique, 1 ml 40 mg methylprednisolone, 4 ml 1% Lidocaine, and 5 mL 0.25% Bupivicaine were injected. A sterile bandage was applied. The patient tolerated the procedure well and there were no complications.  1/23/24: Left knee injection - Steroid

## 2024-08-05 NOTE — PROCEDURE
[de-identified] : 8/5/24: Left knee injection - Steroid (Methylprednisolone) The risks, benefits, and alternatives of the injection were reviewed/discussed with the patient today in office and all of their questions were answered. We discussed possible side effects and efficacy of this injection.  The patient consented to the procedure.  Prior to injection, a Time Out was conducted in accordance with [facility] policy and the site and nature of procedure verified with the patient.  Site and side were verified with the patient.  The injection site was prepped with chloroprep.  Cold spray was utilized to numb the skin. Utilizing sterile technique, 1 ml 40 mg methylprednisolone, 4 ml 1% Lidocaine, and 5 mL 0.25% Bupivicaine were injected. A sterile bandage was applied. The patient tolerated the procedure well and there were no complications.  1/23/24: Left knee injection - Steroid

## 2024-08-05 NOTE — PHYSICAL EXAM
[de-identified] : Left lower extremity Hip: Normal ROM without pain on IR/ER Knee Inspection: Minimal effusion Wounds: None Alignment: Neutral Palpation: Nontender palpation ROM active (in degrees): 0-120 with crepitus/pain through the arc of motion Ligamentous laxity: all ligaments appear stable, stable to varus stress test, stable to valgus stress test Muscle Test: good quad strength. [de-identified] : 8/5/24: Left knee xrays, standing AP/Lateral and Merchant films, and 45 degree PA standing view are reviewed and demonstrate diffuse tricompartmental degenerative arthritis, medial and lateral joint space narrowing, marginal osteophytes, patellofemoral joint space narrowing AP pelvis and AP lateral left hip-hardware in good alignment 1/23/24: Left knee xrays, standing AP/Lateral and Merchant films, and 45 degree PA standing view are reviewed and demonstrate diffuse tricompartmental degenerative arthritis, medial and lateral joint space narrowing, marginal osteophytes, bone on bone with sclerosis, patellofemoral joint space narrowing Left hip AP lateral and AP pelvis-hardware in good alignment 10/23/23: Left knee xrays, standing AP/Lateral and Merchant films, and 45 degree PA standing view are reviewed and demonstrate diffuse tricompartmental degenerative arthritis, medial and lateral joint space narrowing, marginal osteophytes, bone on bone with sclerosis, patellofemoral joint space narrowing

## 2024-08-05 NOTE — HISTORY OF PRESENT ILLNESS
[de-identified] : 8/5/24: Patient here status post left hip cemented Juan Jose hip arthroplasty on 8/5/2023 and left knee pain.  States the steroid injection worked well up until recently.  Taking Tylenol occasionally for pain.  Does have some pain in the groin.  Is interested in another steroid injection and physical therapy as well.  1/23/24: Patient here status post left hip cemented Juan Jose hip arthroplasty on 8/5/2023.  States she has some groin pain and lateral hip pain.  Doing physical therapy.  Ambulating with a walker.  On Eliquis.  Knee pain is also continuing bothering her.  10/23/23: Patient presenting with complaints of left knee pain.  Pain is mostly on the anterior aspect of knee.  Denies buckling symptoms or radiating pain.

## 2024-08-05 NOTE — DISCUSSION/SUMMARY
[de-identified] : Left knee osteoarthritis,  status post Juan Jose hip arthroplasty  Extensive discussion of the natural history of this issue was had with the patient.  We discussed the treatment options focusing on conservative therapy which includes anti-inflammatories, physical therapy/home exercise, & activity modification.   Recommend topical Voltaren gel. New prescription for physical therapy.   Patient elected for a steroid injection left knee. Patient will return if the pain returns or does not resolve.  She will follow-up for the hip annually.  The patient's current medication management of their orthopedic diagnosis was reviewed today: (1) We discussed a comprehensive treatment plan that included possible pharmaceutical management involving the use of prescription strength medications including but not limited to options such as oral Ibuprofen 400mg QID, once daily Meloxicam 15 mg, or 500-650 mg Tylenol versus over the counter oral medications and topical prescription NSAID Pennsaid vs over the counter Voltaren gel. (2) There is a moderate risk of morbidity with further treatment, especially from use of prescription strength medications and possible side effects of these medications which include upset stomach with oral medications, skin reactions to topical medications and cardiac/renal issues with long term use. (3) I recommended that the patient follow-up with their medical physician to discuss any significant specific potential issues with long term medication use such as interactions with current medications or with exacerbation of underlying medical comorbidities. (4) The benefits and risks associated with use of injectable, oral or topical, prescription and over the counter anti-inflammatory medications were discussed with the patient. The patient voiced understanding of the risks including but not limited to bleeding, stroke, kidney dysfunction, heart disease, and were referred to the black box warning label for further information.

## 2024-08-05 NOTE — HISTORY OF PRESENT ILLNESS
[de-identified] : 8/5/24: Patient here status post left hip cemented Juan Jose hip arthroplasty on 8/5/2023 and left knee pain.  States the steroid injection worked well up until recently.  Taking Tylenol occasionally for pain.  Does have some pain in the groin.  Is interested in another steroid injection and physical therapy as well.  1/23/24: Patient here status post left hip cemented Juan Jose hip arthroplasty on 8/5/2023.  States she has some groin pain and lateral hip pain.  Doing physical therapy.  Ambulating with a walker.  On Eliquis.  Knee pain is also continuing bothering her.  10/23/23: Patient presenting with complaints of left knee pain.  Pain is mostly on the anterior aspect of knee.  Denies buckling symptoms or radiating pain.

## 2024-08-16 NOTE — ASU PATIENT PROFILE, ADULT - INTERNATIONAL TRAVEL
the retropatellar fat pad, patella, patellar tendon, tibia, and to ensure proper intra-articular placement of medication.  Injection image was stored in the patient's permanent chart.  The injection was without event and I will follow them as scheduled.        
No

## 2024-10-11 ENCOUNTER — APPOINTMENT (OUTPATIENT)
Dept: SURGICAL ONCOLOGY | Facility: CLINIC | Age: 84
End: 2024-10-11
Payer: MEDICARE

## 2024-10-11 ENCOUNTER — NON-APPOINTMENT (OUTPATIENT)
Age: 84
End: 2024-10-11

## 2024-10-11 VITALS
WEIGHT: 200 LBS | DIASTOLIC BLOOD PRESSURE: 70 MMHG | HEART RATE: 76 BPM | BODY MASS INDEX: 34.15 KG/M2 | RESPIRATION RATE: 16 BRPM | OXYGEN SATURATION: 95 % | HEIGHT: 64 IN | SYSTOLIC BLOOD PRESSURE: 128 MMHG

## 2024-10-11 DIAGNOSIS — C43.59 MALIGNANT MELANOMA OF OTHER PART OF TRUNK: ICD-10-CM

## 2024-10-11 PROCEDURE — 99205 OFFICE O/P NEW HI 60 MIN: CPT

## 2024-10-21 ENCOUNTER — OUTPATIENT (OUTPATIENT)
Dept: OUTPATIENT SERVICES | Facility: HOSPITAL | Age: 84
LOS: 1 days | End: 2024-10-21
Payer: MEDICARE

## 2024-10-21 ENCOUNTER — RESULT REVIEW (OUTPATIENT)
Age: 84
End: 2024-10-21

## 2024-10-21 DIAGNOSIS — Z90.49 ACQUIRED ABSENCE OF OTHER SPECIFIED PARTS OF DIGESTIVE TRACT: Chronic | ICD-10-CM

## 2024-10-21 DIAGNOSIS — C80.1 MALIGNANT (PRIMARY) NEOPLASM, UNSPECIFIED: ICD-10-CM

## 2024-10-21 PROCEDURE — 88321 CONSLTJ&REPRT SLD PREP ELSWR: CPT

## 2024-10-22 LAB — SURGICAL PATHOLOGY STUDY: SIGNIFICANT CHANGE UP

## 2024-10-29 ENCOUNTER — OUTPATIENT (OUTPATIENT)
Dept: OUTPATIENT SERVICES | Facility: HOSPITAL | Age: 84
LOS: 1 days | End: 2024-10-29
Payer: MEDICARE

## 2024-10-29 VITALS
SYSTOLIC BLOOD PRESSURE: 118 MMHG | OXYGEN SATURATION: 90 % | WEIGHT: 201.06 LBS | TEMPERATURE: 97 F | HEIGHT: 64.25 IN | DIASTOLIC BLOOD PRESSURE: 66 MMHG | RESPIRATION RATE: 18 BRPM | HEART RATE: 72 BPM

## 2024-10-29 DIAGNOSIS — Z96.642 PRESENCE OF LEFT ARTIFICIAL HIP JOINT: Chronic | ICD-10-CM

## 2024-10-29 DIAGNOSIS — E11.9 TYPE 2 DIABETES MELLITUS WITHOUT COMPLICATIONS: ICD-10-CM

## 2024-10-29 DIAGNOSIS — K21.9 GASTRO-ESOPHAGEAL REFLUX DISEASE WITHOUT ESOPHAGITIS: ICD-10-CM

## 2024-10-29 DIAGNOSIS — Z87.09 PERSONAL HISTORY OF OTHER DISEASES OF THE RESPIRATORY SYSTEM: Chronic | ICD-10-CM

## 2024-10-29 DIAGNOSIS — K21.9 GASTRO-ESOPHAGEAL REFLUX DISEASE WITHOUT ESOPHAGITIS: Chronic | ICD-10-CM

## 2024-10-29 DIAGNOSIS — I10 ESSENTIAL (PRIMARY) HYPERTENSION: ICD-10-CM

## 2024-10-29 DIAGNOSIS — Z87.39 PERSONAL HISTORY OF OTHER DISEASES OF THE MUSCULOSKELETAL SYSTEM AND CONNECTIVE TISSUE: Chronic | ICD-10-CM

## 2024-10-29 DIAGNOSIS — C43.59 MALIGNANT MELANOMA OF OTHER PART OF TRUNK: ICD-10-CM

## 2024-10-29 DIAGNOSIS — Z90.49 ACQUIRED ABSENCE OF OTHER SPECIFIED PARTS OF DIGESTIVE TRACT: Chronic | ICD-10-CM

## 2024-10-29 DIAGNOSIS — Z01.818 ENCOUNTER FOR OTHER PREPROCEDURAL EXAMINATION: ICD-10-CM

## 2024-10-29 LAB
ANION GAP SERPL CALC-SCNC: 11 MMOL/L — SIGNIFICANT CHANGE UP (ref 5–17)
BUN SERPL-MCNC: 21 MG/DL — SIGNIFICANT CHANGE UP (ref 7–23)
CALCIUM SERPL-MCNC: 9.3 MG/DL — SIGNIFICANT CHANGE UP (ref 8.4–10.5)
CHLORIDE SERPL-SCNC: 104 MMOL/L — SIGNIFICANT CHANGE UP (ref 96–108)
CO2 SERPL-SCNC: 26 MMOL/L — SIGNIFICANT CHANGE UP (ref 22–31)
CREAT SERPL-MCNC: 1.3 MG/DL — SIGNIFICANT CHANGE UP (ref 0.5–1.3)
EGFR: 40 ML/MIN/1.73M2 — LOW
GLUCOSE SERPL-MCNC: 98 MG/DL — SIGNIFICANT CHANGE UP (ref 70–99)
HCT VFR BLD CALC: 38.9 % — SIGNIFICANT CHANGE UP (ref 34.5–45)
HGB BLD-MCNC: 12.8 G/DL — SIGNIFICANT CHANGE UP (ref 11.5–15.5)
MCHC RBC-ENTMCNC: 29.9 PG — SIGNIFICANT CHANGE UP (ref 27–34)
MCHC RBC-ENTMCNC: 32.9 G/DL — SIGNIFICANT CHANGE UP (ref 32–36)
MCV RBC AUTO: 90.9 FL — SIGNIFICANT CHANGE UP (ref 80–100)
NRBC # BLD: 0 /100 WBCS — SIGNIFICANT CHANGE UP (ref 0–0)
PLATELET # BLD AUTO: 288 K/UL — SIGNIFICANT CHANGE UP (ref 150–400)
POTASSIUM SERPL-MCNC: 4.3 MMOL/L — SIGNIFICANT CHANGE UP (ref 3.5–5.3)
POTASSIUM SERPL-SCNC: 4.3 MMOL/L — SIGNIFICANT CHANGE UP (ref 3.5–5.3)
RBC # BLD: 4.28 M/UL — SIGNIFICANT CHANGE UP (ref 3.8–5.2)
RBC # FLD: 14.7 % — HIGH (ref 10.3–14.5)
SODIUM SERPL-SCNC: 141 MMOL/L — SIGNIFICANT CHANGE UP (ref 135–145)
WBC # BLD: 8.49 K/UL — SIGNIFICANT CHANGE UP (ref 3.8–10.5)
WBC # FLD AUTO: 8.49 K/UL — SIGNIFICANT CHANGE UP (ref 3.8–10.5)

## 2024-10-29 NOTE — H&P PST ADULT - NSICDXPASTSURGICALHX_GEN_ALL_CORE_FT
PAST SURGICAL HISTORY:  GERD (gastroesophageal reflux disease)     H/O osteoporosis     History of cholecystectomy     History of COPD     History of gout     History of total left hip replacement     S/P appendectomy

## 2024-10-29 NOTE — H&P PST ADULT - HISTORY OF PRESENT ILLNESS
82 y/o female with PMH COPD( oxygen dependent), ANU, HTN, HLD, DM, a-fib ( on Eliquis) and IBS presents for PST.  C/O abnormal skin findings discovered at dermatology appointment done for screening, pos for melanoma and recommended for upcoming procedure.  Otherwise feeling well at PST today with no recent acute illness. fever or chest pain.  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

## 2024-10-29 NOTE — H&P PST ADULT - RESPIRATORY AND THORAX COMMENTS
hx COPD- 6 lpm n/c ATC;  hx ANU no cpap-   pulm clearance requested per surgeon; denies hx if intubation or recent exacerbation

## 2024-10-29 NOTE — H&P PST ADULT - RESPIRATORY
Condition:: Left earlobe split clear to auscultation bilaterally/no wheezes/no rales/no rhonchi/no respiratory distress/no use of accessory muscles

## 2024-10-30 LAB
A1C WITH ESTIMATED AVERAGE GLUCOSE RESULT: 5.9 % — HIGH (ref 4–5.6)
ESTIMATED AVERAGE GLUCOSE: 123 MG/DL — HIGH (ref 68–114)

## 2024-11-05 ENCOUNTER — RESULT REVIEW (OUTPATIENT)
Age: 84
End: 2024-11-05

## 2024-11-05 ENCOUNTER — APPOINTMENT (OUTPATIENT)
Dept: SURGICAL ONCOLOGY | Facility: HOSPITAL | Age: 84
End: 2024-11-05

## 2024-11-05 ENCOUNTER — OUTPATIENT (OUTPATIENT)
Dept: OUTPATIENT SERVICES | Facility: HOSPITAL | Age: 84
LOS: 1 days | End: 2024-11-05
Payer: MEDICARE

## 2024-11-05 ENCOUNTER — TRANSCRIPTION ENCOUNTER (OUTPATIENT)
Age: 84
End: 2024-11-05

## 2024-11-05 VITALS
TEMPERATURE: 97 F | SYSTOLIC BLOOD PRESSURE: 115 MMHG | DIASTOLIC BLOOD PRESSURE: 59 MMHG | OXYGEN SATURATION: 92 % | HEART RATE: 60 BPM | RESPIRATION RATE: 18 BRPM | HEIGHT: 64.25 IN | WEIGHT: 201.06 LBS

## 2024-11-05 VITALS
TEMPERATURE: 98 F | HEART RATE: 66 BPM | OXYGEN SATURATION: 91 % | SYSTOLIC BLOOD PRESSURE: 105 MMHG | RESPIRATION RATE: 16 BRPM | DIASTOLIC BLOOD PRESSURE: 61 MMHG

## 2024-11-05 DIAGNOSIS — Z87.09 PERSONAL HISTORY OF OTHER DISEASES OF THE RESPIRATORY SYSTEM: Chronic | ICD-10-CM

## 2024-11-05 DIAGNOSIS — Z87.39 PERSONAL HISTORY OF OTHER DISEASES OF THE MUSCULOSKELETAL SYSTEM AND CONNECTIVE TISSUE: Chronic | ICD-10-CM

## 2024-11-05 DIAGNOSIS — Z96.642 PRESENCE OF LEFT ARTIFICIAL HIP JOINT: Chronic | ICD-10-CM

## 2024-11-05 DIAGNOSIS — K21.9 GASTRO-ESOPHAGEAL REFLUX DISEASE WITHOUT ESOPHAGITIS: Chronic | ICD-10-CM

## 2024-11-05 DIAGNOSIS — Z90.49 ACQUIRED ABSENCE OF OTHER SPECIFIED PARTS OF DIGESTIVE TRACT: Chronic | ICD-10-CM

## 2024-11-05 DIAGNOSIS — C43.59 MALIGNANT MELANOMA OF OTHER PART OF TRUNK: ICD-10-CM

## 2024-11-05 LAB — GLUCOSE BLDC GLUCOMTR-MCNC: 128 MG/DL — HIGH (ref 70–99)

## 2024-11-05 PROCEDURE — 88341 IMHCHEM/IMCYTCHM EA ADD ANTB: CPT

## 2024-11-05 PROCEDURE — 78195 LYMPH SYSTEM IMAGING: CPT | Mod: MC

## 2024-11-05 PROCEDURE — C1889: CPT

## 2024-11-05 PROCEDURE — 88342 IMHCHEM/IMCYTCHM 1ST ANTB: CPT

## 2024-11-05 PROCEDURE — 38792 RA TRACER ID OF SENTINL NODE: CPT | Mod: RT,XU

## 2024-11-05 PROCEDURE — 38790 INJECT FOR LYMPHATIC X-RAY: CPT | Mod: RT,XU

## 2024-11-05 PROCEDURE — 88342 IMHCHEM/IMCYTCHM 1ST ANTB: CPT | Mod: 26

## 2024-11-05 PROCEDURE — 38308 INCISION OF LYMPH CHANNELS: CPT

## 2024-11-05 PROCEDURE — 88307 TISSUE EXAM BY PATHOLOGIST: CPT | Mod: 26

## 2024-11-05 PROCEDURE — 38900 IO MAP OF SENT LYMPH NODE: CPT | Mod: RT

## 2024-11-05 PROCEDURE — 21936 RESECT BACK TUM 5 CM/>: CPT

## 2024-11-05 PROCEDURE — A9541: CPT

## 2024-11-05 PROCEDURE — 78195 LYMPH SYSTEM IMAGING: CPT | Mod: 26,MC

## 2024-11-05 PROCEDURE — 38525 BIOPSY/REMOVAL LYMPH NODES: CPT | Mod: RT

## 2024-11-05 PROCEDURE — 88341 IMHCHEM/IMCYTCHM EA ADD ANTB: CPT | Mod: 26

## 2024-11-05 PROCEDURE — 88307 TISSUE EXAM BY PATHOLOGIST: CPT

## 2024-11-05 PROCEDURE — 14001 TIS TRNFR TRUNK 10.1-30SQCM: CPT

## 2024-11-05 PROCEDURE — 82962 GLUCOSE BLOOD TEST: CPT

## 2024-11-05 PROCEDURE — 88305 TISSUE EXAM BY PATHOLOGIST: CPT

## 2024-11-05 PROCEDURE — 88305 TISSUE EXAM BY PATHOLOGIST: CPT | Mod: 26

## 2024-11-05 DEVICE — AGENT HEMOSTATIC HEMOBLAST 1.65G 10CM: Type: IMPLANTABLE DEVICE | Status: FUNCTIONAL

## 2024-11-05 DEVICE — SURGICEL FIBRILLAR 4 X 4": Type: IMPLANTABLE DEVICE | Status: FUNCTIONAL

## 2024-11-05 RX ORDER — OXYCODONE HYDROCHLORIDE 30 MG/1
5 TABLET ORAL ONCE
Refills: 0 | Status: DISCONTINUED | OUTPATIENT
Start: 2024-11-05 | End: 2024-11-05

## 2024-11-05 RX ORDER — HYDROMORPHONE HCL/0.9% NACL/PF 6 MG/30 ML
1 PATIENT CONTROLLED ANALGESIA SYRINGE INTRAVENOUS ONCE
Refills: 0 | Status: DISCONTINUED | OUTPATIENT
Start: 2024-11-05 | End: 2024-11-05

## 2024-11-05 RX ORDER — ASCORBIC ACID 500 MG
1 TABLET ORAL
Refills: 0 | DISCHARGE

## 2024-11-05 RX ORDER — HYDROMORPHONE HCL/0.9% NACL/PF 6 MG/30 ML
0.5 PATIENT CONTROLLED ANALGESIA SYRINGE INTRAVENOUS
Refills: 0 | Status: DISCONTINUED | OUTPATIENT
Start: 2024-11-05 | End: 2024-11-05

## 2024-11-05 RX ORDER — GABAPENTIN 300 MG/1
1 CAPSULE ORAL
Refills: 0 | DISCHARGE

## 2024-11-05 RX ORDER — PANTOPRAZOLE SODIUM 40 MG/1
1 TABLET, DELAYED RELEASE ORAL
Refills: 0 | DISCHARGE

## 2024-11-05 RX ORDER — ONDANSETRON HYDROCHLORIDE 2 MG/ML
4 INJECTION, SOLUTION INTRAMUSCULAR; INTRAVENOUS ONCE
Refills: 0 | Status: DISCONTINUED | OUTPATIENT
Start: 2024-11-05 | End: 2024-11-05

## 2024-11-05 NOTE — ASU DISCHARGE PLAN (ADULT/PEDIATRIC) - FINANCIAL ASSISTANCE
[facility] provides services at a reduced cost to those who are determined to be eligible through [facility]’s financial assistance program. Information regarding [facility]’s financial assistance program can be found by going to [email or web address] or by calling [phone].

## 2024-11-05 NOTE — ASU DISCHARGE PLAN (ADULT/PEDIATRIC) - ASU DC SPECIAL INSTRUCTIONSFT
You have white steri strips over your 2 incisions, these should stay dry for 24 hours. In 24 hours you may shower, but do not remove steri strips. They will fall off on their own or be removed in office. Let warm water run over strips and pat dry.     For pain take Extra Strength Tylenol 500mg 2 tablets alternating with Motrin 600mg every 6 hours as needed for pain.    Please hold your home Eliquis medication, you may restart on Thursday 11/7    No heavy lifting for 4 weeks or until cleared by surgeon.    Follow up in office w/ Dr. Santana in 2 weeks, call office to make/confirm appointment.

## 2024-11-05 NOTE — ASU PATIENT PROFILE, ADULT - FALL HARM RISK - UNIVERSAL INTERVENTIONS
Bed in lowest position, wheels locked, appropriate side rails in place/Call bell, personal items and telephone in reach/Instruct patient to call for assistance before getting out of bed or chair/Non-slip footwear when patient is out of bed/[place] to call system/Physically safe environment - no spills, clutter or unnecessary equipment/Purposeful Proactive Rounding/Room/bathroom lighting operational, light cord in reach

## 2024-11-05 NOTE — BRIEF OPERATIVE NOTE - NSICDXBRIEFPROCEDURE_GEN_ALL_CORE_FT
PROCEDURES:  Wide excision, melanoma, torso, with sentinel lymph node biopsy 05-Nov-2024 14:05:18 posterior/back Isela Pierce

## 2024-11-05 NOTE — ASU DISCHARGE PLAN (ADULT/PEDIATRIC) - CARE PROVIDER_API CALL
Bulmaro Santana [place]  [facility]  [address] [place] [id number]  Phone: [phone]  Fax: [phone]  Follow Up Time: 2 weeks

## 2024-11-05 NOTE — ASU DISCHARGE PLAN (ADULT/PEDIATRIC) - NURSING INSTRUCTIONS
Drink plenty of fluids. Alternate taking tylenol and motrin for discomfort. Please follow up with MD for post-op appointment.

## 2024-11-14 LAB — SURGICAL PATHOLOGY STUDY: SIGNIFICANT CHANGE UP

## 2024-11-20 PROCEDURE — G0463: CPT

## 2024-11-20 PROCEDURE — 88321 CONSLTJ&REPRT SLD PREP ELSWR: CPT

## 2024-11-20 PROCEDURE — 85027 COMPLETE CBC AUTOMATED: CPT

## 2024-11-20 PROCEDURE — 36415 COLL VENOUS BLD VENIPUNCTURE: CPT

## 2024-11-20 PROCEDURE — 83036 HEMOGLOBIN GLYCOSYLATED A1C: CPT

## 2024-11-20 PROCEDURE — 80048 BASIC METABOLIC PNL TOTAL CA: CPT

## 2024-11-22 ENCOUNTER — APPOINTMENT (OUTPATIENT)
Dept: SURGICAL ONCOLOGY | Facility: CLINIC | Age: 84
End: 2024-11-22
Payer: MEDICARE

## 2024-11-22 VITALS
WEIGHT: 199 LBS | HEIGHT: 64 IN | OXYGEN SATURATION: 83 % | DIASTOLIC BLOOD PRESSURE: 77 MMHG | BODY MASS INDEX: 33.97 KG/M2 | SYSTOLIC BLOOD PRESSURE: 128 MMHG | HEART RATE: 79 BPM

## 2024-11-22 DIAGNOSIS — C43.59 MALIGNANT MELANOMA OF OTHER PART OF TRUNK: ICD-10-CM

## 2024-11-22 PROCEDURE — 99024 POSTOP FOLLOW-UP VISIT: CPT

## 2024-12-06 NOTE — ED ADULT NURSE NOTE - NSSUHOSCREENINGYN_ED_ALL_ED
[FreeTextEntry1] : #New-onset urine frequency x 1 day - R/o UTI, urine dipstick + blood, will f/u UA and urine culture - No complaints of baseline urgency/frequency symptoms  #Cystocele - Normal PVR today - We discussed exam findings and management options - Does not want surgery again but interested in pessary fitting. She will take time to think about it and call for an appointment if chooses to proceed.  #Renal cysts - Information for [facility] provided.
Yes - the patient is able to be screened

## 2025-02-21 NOTE — H&P ADULT - SOCIAL HISTORY
Patient saturation in room air is hanging from 87-88%, went inside patient room, patient is in bed with eyes closed no SOB noted. Checked SPO2 in room still hanging on 88%. Started O2 inhalation via nasal cannula at 1lpm. 91-92% saturation noted after a minute.   No

## 2025-03-19 ENCOUNTER — APPOINTMENT (OUTPATIENT)
Dept: SURGICAL ONCOLOGY | Facility: CLINIC | Age: 85
End: 2025-03-19
Payer: MEDICARE

## 2025-03-19 VITALS
DIASTOLIC BLOOD PRESSURE: 69 MMHG | HEIGHT: 64 IN | SYSTOLIC BLOOD PRESSURE: 120 MMHG | WEIGHT: 199 LBS | HEART RATE: 80 BPM | BODY MASS INDEX: 33.97 KG/M2

## 2025-03-19 DIAGNOSIS — C43.59 MALIGNANT MELANOMA OF OTHER PART OF TRUNK: ICD-10-CM

## 2025-03-19 PROCEDURE — 99214 OFFICE O/P EST MOD 30 MIN: CPT

## 2025-05-20 NOTE — H&P PST ADULT - PROBLEM SELECTOR PLAN 1
Outpatient Rehab    Occupational Therapy Visit    Patient Name: Charlie Mazariegos Jr.  MRN: [id number]  YOB: 1984  Encounter Date: 2025    Therapy Diagnosis:   Encounter Diagnoses   Name Primary?    Left elbow pain Yes    Elbow stiffness, left     Pain in joint of left shoulder      Physician: Lisandra Lazaro PA-C    Physician Orders: Eval and Treat  Medical Diagnosis: Unspecified fracture of lower end of left humerus, initial encounter for closed fracture    Visit # / Visits Authorized:   Insurance Authorization Period: 3/13/2025 to 2025  Date of Evaluation: 3/13/2025  Plan of Care Certification: 3/13/2025 to 2025      Time In: 0945   Time Out: 1030  Total Time (in minutes): 45   Total Billable Time (in minutes): 45    FOTO:  Intake Score: 50%  Survey Score 2: 54%  Survey Score 3: 54%    Precautions:       Subjective   Stated that his surgeon has ordered a dynasplint for his to promote increase in elbow extension. Will monitor shoulder symptoms when using..    Not rated today.    Objective                    Treatment:  Therapeutic Exercise  TE 4: Seated: FA 3 planes, elbow Pre's,  3/10 ea each plane with 5 lb dumbbell,  TE 5: Seated: FA 3 planes, wrist  Pre's, 3/10 ea each plane with 5 lb dumbbell,  TE 7: FA PRE's , 5 lbs, holding on end 3/20  TE 8: Prone, 5 lbs, row and shoulder ext 3/10 reps  TE 9: Prone: 5 lbs, elbow ext, 2/10 reps  Therapeutic Activity  TA 2: BTE- 45 sec each of the followin: wrist flex, 504 wrist ext, 504 fa sup, 504 fa pro, 302 wrist rd, 302 wrist ud 162: 3-jaw pinch (45 sec), 162: lateral pinch (30 sec), 162:  (60 sec)    Time Entry(in minutes):  Therapeutic Activity Time Entry: 15  Therapeutic Exercise Time Entry: 30    Assessment & Plan   Assessment: [place] session today due to pt being late. He was delayed by a earlier appt with his surgeon.  Evaluation/Treatment Tolerance: Patient tolerated treatment well    Patient will continue to  benefit from skilled outpatient occupational therapy to address the deficits listed in the problem list box on initial evaluation, provide pt/family education and to maximize pt's level of independence in the home and community environment.     Patient's spiritual, cultural, and educational needs considered and patient agreeable to plan of care and goals.           Plan: Continue per OT POC with focus on return to funcitonal independence. Await delivery of rosa splint to patient .    Goals:   Active       Long Term Goals       Ontario will demonstrate significantly improved functional performance from re-assessment as measured by a 20 ncrease in FOTO function score.  (Progressing)       Start:  03/13/25    Expected End:  06/05/25            Patient will achieve left  strength that is 90% that of the .right dominant hand to improve function with ADLs/work/leisure tasks.  (Progressing)       Start:  03/13/25    Expected End:  06/05/25            Patient will report pain 2 out of 10 at worst during use  in IADL's to improve function with ADLs/work/leisure tasks..  (Progressing)       Start:  03/13/25    Expected End:  06/05/25            Patient will demo full fa rom  to improve functional grasp for ADLs/work/leisure tasks.   (Met)       Start:  03/13/25    Expected End:  06/05/25    Resolved:  05/20/25         Pt will demo full wrist arom to improve function with ADLs/work/leisure tasks.  (Met)       Start:  03/13/25    Expected End:  06/05/25    Resolved:  05/20/25            short term goals       [place]ario will demonstrate significantly improved functional performance from re-assessment as measured by a 10 point Increase in FOTO function score.  (Progressing)       Start:  03/13/25    Expected End:  06/05/25            Patient will achieve left  strength that is 60 % that of the  right dominant hand to improve function with ADLs/work/leisure tasks.  (Progressing)       Start:  03/13/25    Expected End:  06/05/25             Patient will report pain 2 out of 10 at worst during use in light ADL's to improve function with ADLs/work/leisure tasks. .  (Progressing)       Start:  03/13/25    Expected End:  06/05/25            Patient will demo 10 to 120 degrees elbow arom for ADLs/work/leisure tasks.   (Progressing)       Start:  03/13/25    Expected End:  06/05/25            Pt will demo full arom FA  to improve function with ADLs/work/leisure tasks.  (Progressing)       Start:  03/13/25    Expected End:  06/05/25                Mariola Durand OT     Scheduled for wide excision of right upper back melanoma, right axillary sentinel lymph node biopsy with Dr Santana on 11/05/2024.  Pre op instructions given and patient verbalized understanding.  CBC, BMP, HgbA1C requested from pcp,  EKG requested from cardio.  Pending medical, cardio and pulm clearance.  NPO after midnight night before procedure.  may take Diltiazem, pantoprazole and metoprolol AM of procedure with small sip of water.  Eliquis stope per cardio- was told 3 days prior.  To stop all ASA, NSAIDs, vitamins and supplement 1 week prior to procedure.  Chlorhexidene wash given with instructions

## 2025-09-04 ENCOUNTER — RESULT REVIEW (OUTPATIENT)
Age: 85
End: 2025-09-04

## (undated) DEVICE — DRAPE CHEST BREAST 106" X 122"

## (undated) DEVICE — ELCTR STRYKER NEPTUNE SMOKE EVACUATION PENCIL (GREEN)

## (undated) DEVICE — SOL IRR POUR NS 0.9% 500ML

## (undated) DEVICE — BLADE SCALPEL SAFETYLOCK #15

## (undated) DEVICE — WARMING BLANKET UPPER ADULT

## (undated) DEVICE — SUT MONOSOF 3-0 18" P-12

## (undated) DEVICE — DRSG CURITY GAUZE SPONGE 4 X 4" 12-PLY

## (undated) DEVICE — PACK MINOR

## (undated) DEVICE — GAMMA SLEEVE DISPOSABLE

## (undated) DEVICE — DRAPE TOWEL BLUE 17" X 24"

## (undated) DEVICE — SUT POLYSORB 3-0 30" V-20 UNDYED

## (undated) DEVICE — DRAPE COVER SLEEVE GAMMA FINDER III

## (undated) DEVICE — SUT SOFSILK 2-0 30" V-20

## (undated) DEVICE — DRAPE INSTRUMENT POUCH 6.75" X 11"

## (undated) DEVICE — SUT POLYSORB 2-0 30" GS-21 UNDYED

## (undated) DEVICE — DRAPE MAYO STAND 30"

## (undated) DEVICE — FOLEY TRAY 16FR 5CC LTX UMETER CLOSED

## (undated) DEVICE — SUT MONOSOF 4-0 18" P-12

## (undated) DEVICE — SUT MONOCRYL 4-0 27" PS-2 UNDYED

## (undated) DEVICE — LIGASURE SMALL JAW

## (undated) DEVICE — MARKING PEN W RULER

## (undated) DEVICE — GOWN TRIMAX LG

## (undated) DEVICE — SPECIMEN CONTAINER 100ML

## (undated) DEVICE — DRSG STERISTRIPS 0.5 X 4"

## (undated) DEVICE — DRSG OPSITE 2.5 X 2"

## (undated) DEVICE — DRSG TEGADERM 6"X8"

## (undated) DEVICE — SOL IRR POUR H2O 250ML

## (undated) DEVICE — VENODYNE/SCD SLEEVE CALF LARGE

## (undated) DEVICE — VENODYNE/SCD SLEEVE CALF MEDIUM

## (undated) DEVICE — SUT MONOCRYL 3-0 18" PS-2 UNDYED

## (undated) DEVICE — GLV 7 PROTEXIS (WHITE)

## (undated) DEVICE — Device

## (undated) DEVICE — POSITIONER FOAM EGG CRATE ULNAR 2PCS (PINK)